# Patient Record
Sex: MALE | Race: WHITE | NOT HISPANIC OR LATINO | Employment: FULL TIME | URBAN - METROPOLITAN AREA
[De-identification: names, ages, dates, MRNs, and addresses within clinical notes are randomized per-mention and may not be internally consistent; named-entity substitution may affect disease eponyms.]

---

## 2017-11-02 ENCOUNTER — ALLSCRIPTS OFFICE VISIT (OUTPATIENT)
Dept: OTHER | Facility: OTHER | Age: 57
End: 2017-11-02

## 2017-11-03 NOTE — PROGRESS NOTES
Assessment  1  Right shoulder pain (328 80) (H80 073)    Plan  Right shoulder pain    · Meloxicam 7 5 MG Oral Tablet; TAKE 1 TABLET TWICE DAILY WITH FOOD    Discussion/Summary    Discussed diagnostic and treatment options with patient  Discussed x-ray although patient prefers to hold off at this time and agree with no trauma involved  Patient will try meloxicam 7 5 mg 1 b i d  with food, caution regarding GI upset  Patient instructed to apply ice for 20 minutes 3-4 times daily and to avoid strenuous activity  Patient to return the office in 2 weeks or sooner anu Smith Possible side effects of new medications were reviewed with the patient/guardian today  The treatment plan was reviewed with the patient/guardian  The patient/guardian understands and agrees with the treatment plan      Chief Complaint  Right shoulder pain      History of Present Illness  Shoulder Pain:   Breezy Medina presents with complaints of intermittent episodes of right and right lateral shoulder pain, described as sharp, non-radiating  Episodes started 8 days ago  Symptoms are made worse by shoulder motion, but not by gripping and lifting  Symptoms are improving  Associated symptoms include numbness in the arm, but-- no swelling,-- no clicking,-- no shoulder bruising,-- no decreased range of motion,-- no instability,-- no redness,-- no warmth,-- no weakness in the arm,-- no paresthesias,-- no pain in the neck,-- no chest pain,-- no fever-- and-- no localized rash  HPI: Patient complains of intermittent right lateral shoulder pain and numbness for the past 8 days  Patient denies any specific injury or fall  Patient denies any neck pain or pain radiating down his arm  Patient thinks he may have slept on it wrong  Patient has continued weight training this week without increased symptoms  No treatment by patient  Patient is primarily left handed  Active Problems  1  Allergic rhinitis (477 9) (J30 9)   2  Chest pain (786 50) (R07 9)   3  Cholelithiasis (574 20) (K80 20)   4  Decreased testosterone level (257 2) (E29 1)   5  Encounter for prostate cancer screening (V76 44) (Z12 5)   6  Encounter for screening colonoscopy (V76 51) (Z12 11)   7  Esophageal reflux (530 81) (K21 9)   8  Fatty liver (571 8) (K76 0)   9  Flu vaccine need (V04 81) (Z23)   10  Gastritis (535 50) (K29 70)   11  Hyperlipidemia (272 4) (E78 5)   12  Hypertension (401 9) (I10)   13  Hypogonadotropic hypogonadism (253 4) (E23 0)   14  Male erectile disorder (607 84) (N52 9)   15  Vitamin D deficiency (268 9) (E55 9)    Family History  Mother    1  Family history of Hypertension (V17 49)   2  Family history of Renal Failure  Brother    3  Family history of Acute Myocardial Infarction (V17 3)   4  Family history of Acute Myocardial Infarction (V17 3)    Social History   · Being A Social Drinker   · Chewing Nicotine-containing Substances Tobacco   · former chewer  pt stopped ? yrs ago   · Never A Smoker   · Never smoker    Surgical History  1  History of Tonsillectomy    Current Meds   1  Fiber TABS; Therapy: (Recorded:81Zsr5004) to Recorded   2  Fish Oil OIL; Therapy: (Recorded:65Csg2203) to Recorded   3  Fluticasone Propionate 50 MCG/ACT Nasal Suspension; USE 2 SPRAYS IN EACH   NOSTRIL ONCE DAILY; Therapy: 30UFP1181 to (Last Rx:22Jan2016)  Requested for: 79MMO3532 Ordered   4  Lisinopril-Hydrochlorothiazide 20-12 5 MG Oral Tablet; Take 1 tablet daily; Therapy: 85IOR0084 to (Last Rx:11Aug2017)  Requested for: 11Aug2017 Ordered   5  Multi-Vitamin TABS; Therapy: (Recorded:69Amx4739) to Recorded   6  Omeprazole 20 MG Oral Capsule Delayed Release; take 1 capsule daily; Therapy: 36ABY8653 to (Last Rx:09Tmg1212)  Requested for: 39UEP2330 Ordered   7  Simvastatin 40 MG Oral Tablet; Take 1 tablet daily; Therapy: 06IAE7939 to (Last Rx:11Aug2017)  Requested for: 11Aug2017 Ordered   8  Viagra 100 MG Oral Tablet; TAKE 1 TABLET DAILY 1 HOUR BEFORE NEEDED;    Therapy: 61UKV8050 to (Last Rx:13Nov2015) Ordered   9  Vitamin B Complex CAPS; Therapy: (Recorded:32Vqa2894) to Recorded   10  Vitamin D 1000 UNIT CAPS; Taking  3 daily for a total of 3000u daily; Therapy: (Recorded:21Jan2016) to Recorded    Allergies  1  Avelox TABS    Vitals   Recorded: [id number] 09:15AM Recorded: 95ETL6768 08:48AM   Temperature  97 8 F   Heart Rate 80    Respiration 16    Systolic  512   Diastolic  88   Height  5 ft 11 75 in   Weight  210 lb    BMI Calculated  28 68   BSA Calculated  2 17     Physical Exam    Constitutional   General appearance: No acute distress, well appearing and well nourished  Pulmonary   Respiratory effort: No increased work of breathing or signs of respiratory distress  Auscultation of lungs: Clear to auscultation, equal breath sounds bilaterally, no wheezes, no rales, no rhonci  Cardiovascular   Auscultation of heart: Normal rate and rhythm, normal S1 and S2, without murmurs  Examination of extremities for edema and/or varicosities: Normal     Abdomen   Abdomen: Non-tender, no masses  Lymphatic   Palpation of lymph nodes in neck: No lymphadenopathy  Musculoskeletal   Gait and station: Normal     Inspection/palpation of joints, bones, and muscles: Normal  -- Right shoulder reveals full range of motion intact and nontender  Strength intact  Mild discomfort over deltoid area but no acute tenderness  Neck reveals full range of motion intact and nontender  Skin   Skin and subcutaneous tissue: Normal without rashes or lesions      Psychiatric   Orientation to person, place and time: Normal     Mood and affect: Normal          Future Appointments    Date/Time Provider Specialty Site   12/15/2017 08:00 AM Nidhi Coleman, [address] [address] [facility]   Electronically signed by : Jorje Ohara DO; Nov 2 2017  9:27AM EST                       (Author)

## 2017-12-15 ENCOUNTER — LAB REQUISITION (OUTPATIENT)
Dept: LAB | Facility: HOSPITAL | Age: 57
End: 2017-12-15
Payer: COMMERCIAL

## 2017-12-15 ENCOUNTER — ALLSCRIPTS OFFICE VISIT (OUTPATIENT)
Dept: OTHER | Facility: OTHER | Age: 57
End: 2017-12-15

## 2017-12-15 DIAGNOSIS — I10 ESSENTIAL (PRIMARY) HYPERTENSION: ICD-10-CM

## 2017-12-15 DIAGNOSIS — Z00.00 ENCOUNTER FOR GENERAL ADULT MEDICAL EXAMINATION WITHOUT ABNORMAL FINDINGS: ICD-10-CM

## 2017-12-15 DIAGNOSIS — Z12.5 ENCOUNTER FOR SCREENING FOR MALIGNANT NEOPLASM OF PROSTATE: ICD-10-CM

## 2017-12-15 DIAGNOSIS — E78.5 HYPERLIPIDEMIA: ICD-10-CM

## 2017-12-15 DIAGNOSIS — K21.9 GASTRO-ESOPHAGEAL REFLUX DISEASE WITHOUT ESOPHAGITIS: ICD-10-CM

## 2017-12-15 DIAGNOSIS — E55.9 VITAMIN D DEFICIENCY: ICD-10-CM

## 2017-12-15 LAB
ALBUMIN SERPL BCP-MCNC: 4.2 G/DL (ref 3.5–5)
ALP SERPL-CCNC: 72 U/L (ref 46–116)
ALT SERPL W P-5'-P-CCNC: 41 U/L (ref 12–78)
ANION GAP SERPL CALCULATED.3IONS-SCNC: 7 MMOL/L (ref 4–13)
AST SERPL W P-5'-P-CCNC: 26 U/L (ref 5–45)
BASOPHILS # BLD AUTO: 0.05 THOUSANDS/ΜL (ref 0–0.1)
BASOPHILS NFR BLD AUTO: 1 % (ref 0–1)
BILIRUB SERPL-MCNC: 0.68 MG/DL (ref 0.2–1)
BILIRUB UR QL STRIP: NEGATIVE
BUN SERPL-MCNC: 19 MG/DL (ref 5–25)
CALCIUM ALBUM COR SERPL-MCNC: 10 MG/DL (ref 8.3–10.1)
CALCIUM SERPL-MCNC: 10.2 MG/DL (ref 8.3–10.1)
CHLORIDE SERPL-SCNC: 103 MMOL/L (ref 100–108)
CHOLEST SERPL-MCNC: 158 MG/DL (ref 50–200)
CLARITY UR: CLEAR
CO2 SERPL-SCNC: 28 MMOL/L (ref 21–32)
COLOR UR: YELLOW
CREAT SERPL-MCNC: 1.1 MG/DL (ref 0.6–1.3)
EOSINOPHIL # BLD AUTO: 0.01 THOUSAND/ΜL (ref 0–0.61)
EOSINOPHIL NFR BLD AUTO: 0 % (ref 0–6)
ERYTHROCYTE [DISTWIDTH] IN BLOOD BY AUTOMATED COUNT: 13.6 % (ref 11.6–15.1)
GFR SERPL CREATININE-BSD FRML MDRD: 74 ML/MIN/1.73SQ M
GLUCOSE SERPL-MCNC: 98 MG/DL (ref 65–140)
GLUCOSE UR STRIP-MCNC: NEGATIVE MG/DL
HCT VFR BLD AUTO: 42.5 % (ref 36.5–49.3)
HDLC SERPL-MCNC: 53 MG/DL (ref 40–60)
HGB BLD-MCNC: 14.3 G/DL (ref 12–17)
HGB UR QL STRIP.AUTO: NEGATIVE
KETONES UR STRIP-MCNC: NEGATIVE MG/DL
LDLC SERPL CALC-MCNC: 87 MG/DL (ref 0–100)
LEUKOCYTE ESTERASE UR QL STRIP: NEGATIVE
LYMPHOCYTES # BLD AUTO: 1.74 THOUSANDS/ΜL (ref 0.6–4.47)
LYMPHOCYTES NFR BLD AUTO: 30 % (ref 14–44)
MCH RBC QN AUTO: 30.2 PG (ref 26.8–34.3)
MCHC RBC AUTO-ENTMCNC: 33.6 G/DL (ref 31.4–37.4)
MCV RBC AUTO: 90 FL (ref 82–98)
MONOCYTES # BLD AUTO: 0.73 THOUSAND/ΜL (ref 0.17–1.22)
MONOCYTES NFR BLD AUTO: 13 % (ref 4–12)
NEUTROPHILS # BLD AUTO: 3.22 THOUSANDS/ΜL (ref 1.85–7.62)
NEUTS SEG NFR BLD AUTO: 56 % (ref 43–75)
NITRITE UR QL STRIP: NEGATIVE
NRBC BLD AUTO-RTO: 0 /100 WBCS
PH UR STRIP.AUTO: 6.5 [PH] (ref 4.5–8)
PLATELET # BLD AUTO: 433 THOUSANDS/UL (ref 149–390)
PMV BLD AUTO: 10.2 FL (ref 8.9–12.7)
POTASSIUM SERPL-SCNC: 5.8 MMOL/L (ref 3.5–5.3)
PROT SERPL-MCNC: 8.3 G/DL (ref 6.4–8.2)
PROT UR STRIP-MCNC: NEGATIVE MG/DL
PSA SERPL-MCNC: 0.9 NG/ML (ref 0–4)
RBC # BLD AUTO: 4.74 MILLION/UL (ref 3.88–5.62)
SODIUM SERPL-SCNC: 138 MMOL/L (ref 136–145)
SP GR UR STRIP.AUTO: 1.02 (ref 1–1.03)
TRIGL SERPL-MCNC: 89 MG/DL
TSH SERPL DL<=0.05 MIU/L-ACNC: 2.93 UIU/ML (ref 0.36–3.74)
UROBILINOGEN UR QL STRIP.AUTO: 0.2 E.U./DL
WBC # BLD AUTO: 5.76 THOUSAND/UL (ref 4.31–10.16)

## 2017-12-15 PROCEDURE — 84443 ASSAY THYROID STIM HORMONE: CPT | Performed by: FAMILY MEDICINE

## 2017-12-15 PROCEDURE — 80053 COMPREHEN METABOLIC PANEL: CPT | Performed by: FAMILY MEDICINE

## 2017-12-15 PROCEDURE — 81003 URINALYSIS AUTO W/O SCOPE: CPT | Performed by: FAMILY MEDICINE

## 2017-12-15 PROCEDURE — G0103 PSA SCREENING: HCPCS | Performed by: FAMILY MEDICINE

## 2017-12-15 PROCEDURE — 85025 COMPLETE CBC W/AUTO DIFF WBC: CPT | Performed by: FAMILY MEDICINE

## 2017-12-15 PROCEDURE — 80061 LIPID PANEL: CPT | Performed by: FAMILY MEDICINE

## 2017-12-16 NOTE — PROGRESS NOTES
Assessment  1  Hypertension (401 9) (I10)   2  Hyperlipidemia (272 4) (E78 5)   3  Encounter for preventive health examination (V70 0) (Z00 00)    Plan  Health Maintenance    · Fluzone Quadrivalent Intramuscular Suspension; INJECT 0 5  MLIntramuscular; To Be Done: 55FUA7531  Health Maintenance, Encounter for prostate cancer screening, Esophageal reflux,Hyperlipidemia, Hypertension, Vitamin D deficiency    · (1) CBC/PLT/DIFF; Status:Hold For - Exact Date; Requested for: In Office Collection;    · (1) COMPREHENSIVE METABOLIC PANEL; Status:Hold For - Exact Date; Requestedfor: In Office Collection;    · (1) LIPID PANEL, FASTING; Status:Hold For - Exact Date; Requested for: In OfficeCollection;    · (1) PSA (SCREEN) (Dx V76 44 Screen for Prostate Cancer); Status:Hold For - Exact Date; Requested for: In Office Collection;    · (1) TSH WITH FT4 REFLEX; Status:Hold For - Exact Date; Requested for: In OfficeCollection;    · (1) URINALYSIS (will reflex a microscopy if leukocytes, occult blood, protein or nitrites arenot within normal limits); Status:Hold For - Exact Date; Requested for: In [employer]; Discussion/Summary    Patient received flu vaccine today  Fasting labs drawn as above  Patient to continue present treatment  Patient instructed to follow a low-fat and a low-salt diet and continue regular exercise, 150 minutes of aerobic exercise weekly  Patient to return to the office in 6 months  Possible side effects of new medications were reviewed with the patient/guardian today  The treatment plan was reviewed with the patient/guardian  The patient/guardian understands and agrees with the treatment plan      Chief Complaint  [facility]   Patient is here today for follow up of chronic conditions described in HPI  History of Present Illness  Patient is a 65-year-old male who is here for an annual wellness preventative physical exam for his health insurance plan and request fasting labs   Patient has been feeling well overall and continues to exercise regularly weight lifting 3 times a week and for the past several months has avoided red meat in his diet and his weight is down approximately 20 lb  Patient requests flu vaccine  Patient denies any vision problems and sees his dentist regularly  Patient is up-to-date on colonoscopy  Patient is a nonsmoker  The patient states his hyperlipidemia has been under good control since the last visit  Comorbid Illnesses: hypertension  He has no significant interval events  Symptoms: denies muscle pain-- and-- denies muscle weakness  Associated symptoms include no memory loss  Medications: the patient is adherent with his medication regimen  -- He denies medication side effects  The patient is doing well with his hyperlipidemia goals  the patient's last LDL was 80 mg/dL  The patient is due for a lipid panel-- and-- liver function tests  The patient presents for follow-up of essential hypertension  The patient states he has been doing well with his blood pressure control since the last visit  He has no significant interval events  Symptoms: denies impaired vision,-- denies dyspnea,-- denies chest pain,-- denies intermittent leg claudication-- and-- denies lower extremity edema  Associated symptoms include no headache  Home monitoring: The patient is not checking blood pressure at home  Medications: the patient is adherent with his medication regimen  -- He denies medication side effects  Disease Management: the patient is doing well with his blood pressure goals  Review of Systems   Constitutional: recent 20 lb weight loss, but-- no fever,-- not feeling poorly,-- no chills-- and-- not feeling tired  Eyes: No complaints of eye pain, no red eyes, no discharge from eyes, no itchy eyes  ENT: nasal discharge, but-- no earache,-- no nosebleeds,-- no sore throat-- and-- no hearing loss    Gastrointestinal: No complaints of abdominal pain, no constipation, no nausea or vomiting, no diarrhea or bloody stools  Genitourinary: no dysuria,-- no urinary hesitancy-- and-- no nocturia  Hematologic/Lymphatic: No complaints of swollen glands, no swollen glands in the neck, does not bleed easily, no easy bruising  Active Problems  1  Allergic rhinitis (477 9) (J30 9)   2  Chest pain (786 50) (R07 9)   3  Cholelithiasis (574 20) (K80 20)   4  Decreased testosterone level (257 2) (E29 1)   5  Encounter for prostate cancer screening (V76 44) (Z12 5)   6  Encounter for screening colonoscopy (V76 51) (Z12 11)   7  Esophageal reflux (530 81) (K21 9)   8  Fatty liver (571 8) (K76 0)   9  Flu vaccine need (V04 81) (Z23)   10  Gastritis (535 50) (K29 70)   11  Hyperlipidemia (272 4) (E78 5)   12  Hypertension (401 9) (I10)   13  Hypogonadotropic hypogonadism (253 4) (E23 0)   14  Male erectile disorder (607 84) (N52 9)   15  Right shoulder pain (719 41) (M25 511)   16  Vitamin D deficiency (268 9) (E55 9)    Surgical History  1  History of Tonsillectomy    Family History  Mother    1  Family history of Hypertension (V17 49)   2  Family history of Renal Failure  Brother    3  Family history of Acute Myocardial Infarction (V17 3)   4  Family history of Acute Myocardial Infarction (V17 3)    Social History   · Being A Social Drinker   · Chewing Nicotine-containing Substances Tobacco   · Never A Smoker   · Never smoker    Current Meds   1  Fiber TABS; Therapy: (Recorded:75Hzk9980) to Recorded   2  Fish Oil OIL; Therapy: (Recorded:77Lat9416) to Recorded   3  Fluticasone Propionate 50 MCG/ACT Nasal Suspension; USE 2 SPRAYS IN EACH NOSTRIL ONCE DAILY; Therapy: 79TGX2774 to (Last Rx:22Jan2016)  Requested for: 63JQN4340 Ordered   4  Lisinopril-Hydrochlorothiazide 20-12 5 MG Oral Tablet; Take 1 tablet daily; Therapy: 44DKD0600 to (Last Rx:11Aug2017)  Requested for: 87VGS1539; Status: ACTIVE - Renewal Denied Ordered   5  Meloxicam 7 5 MG Oral Tablet; TAKE 1 TABLET TWICE DAILY WITH FOOD;  Therapy: 17PTP7736 to (Evaluate:04Xlk6240)  Requested for: 32DOM1801; Last Rx:02Nov2017 Ordered   6  Multi-Vitamin TABS; Therapy: (Recorded:31May2013) to Recorded   7  Omeprazole 20 MG Oral Capsule Delayed Release; take 1 capsule daily; Therapy: 81EJZ7217 to (Last Rx:43Pai1573)  Requested for: 99WBD3899 Ordered   8  Simvastatin 40 MG Oral Tablet; Take 1 tablet daily; Therapy: 90DJG4919 to (Last Rx:11Aug2017)  Requested for: 38PBT3372; Status: ACTIVE - Renewal Denied Ordered   9  Viagra 100 MG Oral Tablet; TAKE 1 TABLET DAILY 1 HOUR BEFORE NEEDED; Therapy: 87EOZ0098 to (Last Rx:13Nov2015) Ordered   10  Vitamin B Complex CAPS; Therapy: (Recorded:81Bug3509) to Recorded   11  Vitamin D 1000 UNIT CAPS; Taking  3 daily for a total of 3000u daily; Therapy: (Recorded:21Jan2016) to Recorded    Allergies  1  Avelox TABS    Vitals  Vital Signs    Recorded: 25QSE6905 08:23AM Recorded: 25Ouj5350 08:02AM   Temperature  97 6 F   Heart Rate 84    Respiration 16    Systolic 786    Diastolic 82    Height  5 ft 11 75 in   Weight  209 lb    BMI Calculated  28 55   BSA Calculated  2 17     Physical Exam   Constitutional  General appearance: No acute distress, well appearing and well nourished  Eyes  Conjunctiva and lids: No swelling, erythema, or discharge  Ears, Nose, Mouth, and Throat  External inspection of ears and nose: Normal    Otoscopic examination: Tympanic membrance translucent with normal light reflex  Canals patent without erythema  Nasal mucosa, septum, and turbinates: Normal without edema or erythema  Oropharynx: Normal with no erythema, edema, exudate or lesions  Pulmonary  Respiratory effort: No increased work of breathing or signs of respiratory distress  Auscultation of lungs: Clear to auscultation, equal breath sounds bilaterally, no wheezes, no rales, no rhonci  Cardiovascular  Auscultation of heart: Normal rate and rhythm, normal S1 and S2, without murmurs     Examination of extremities for edema and/or varicosities: Normal    Carotid pulses: Normal    Abdomen  Abdomen: Non-tender, no masses  Lymphatic  Palpation of lymph nodes in neck: No lymphadenopathy  Musculoskeletal  Gait and station: Normal    Inspection/palpation of joints, bones, and muscles: Normal    Skin  Skin and subcutaneous tissue: Normal without rashes or lesions  Psychiatric  Orientation to person, place and time: Normal    Mood and affect: Normal          Health Management  Hyperlipidemia   (1) HEPATIC FUNCTION PANEL; every 6 months; Last 30POJ3464; Next Due: 63NMT2549; Overdue  (1) LIPID PANEL, FASTING; every 1 year; Last 42ZUE1938; Next Due: 53GBN0005;  Overdue    Signatures   Electronically signed by : Margaret Stark DO; Dec 15 2017  8:33AM EST                       (Author)

## 2017-12-18 ENCOUNTER — GENERIC CONVERSION - ENCOUNTER (OUTPATIENT)
Dept: OTHER | Facility: OTHER | Age: 57
End: 2017-12-18

## 2017-12-18 DIAGNOSIS — E87.5 HYPERKALEMIA: ICD-10-CM

## 2017-12-19 ENCOUNTER — APPOINTMENT (OUTPATIENT)
Dept: LAB | Facility: HOSPITAL | Age: 57
End: 2017-12-19
Payer: COMMERCIAL

## 2017-12-19 ENCOUNTER — GENERIC CONVERSION - ENCOUNTER (OUTPATIENT)
Dept: OTHER | Facility: OTHER | Age: 57
End: 2017-12-19

## 2017-12-19 DIAGNOSIS — E87.5 HYPERKALEMIA: ICD-10-CM

## 2017-12-19 LAB — POTASSIUM SERPL-SCNC: 5.4 MMOL/L (ref 3.5–5.3)

## 2017-12-19 PROCEDURE — 36415 COLL VENOUS BLD VENIPUNCTURE: CPT

## 2017-12-19 PROCEDURE — 84132 ASSAY OF SERUM POTASSIUM: CPT

## 2018-01-12 VITALS
BODY MASS INDEX: 28.44 KG/M2 | HEART RATE: 80 BPM | DIASTOLIC BLOOD PRESSURE: 88 MMHG | SYSTOLIC BLOOD PRESSURE: 138 MMHG | HEIGHT: 72 IN | TEMPERATURE: 97.8 F | RESPIRATION RATE: 16 BRPM | WEIGHT: 210 LBS

## 2018-01-12 NOTE — RESULT NOTES
Verified Results  (1) CBC/PLT/DIFF 26FNO8463 10:17AM [facility] Order Number: [id number]     Test Name Result Flag Reference   WBC COUNT 6 87 Thousand/uL  4 31-10 16   RBC COUNT 4 68 Million/uL  3 88-5 62   HEMOGLOBIN 14 0 g/dL  12 0-17 0   HEMATOCRIT 41 1 %  36 5-49 3   MCV 88 fL  82-98   MCH 29 9 pg  26 8-34 3   MCHC 34 1 g/dL  31 4-37 4   RDW 13 0 %  11 6-15 1   MPV 10 2 fL  8 9-12 7   PLATELET COUNT 019 Thousands/uL  149-390   nRBC AUTOMATED 0 /100 WBCs     NEUTROPHILS RELATIVE PERCENT 59 %  43-75   LYMPHOCYTES RELATIVE PERCENT 29 %  14-44   MONOCYTES RELATIVE PERCENT 11 %  4-12   EOSINOPHILS RELATIVE PERCENT 0 %  0-6   BASOPHILS RELATIVE PERCENT 1 %  0-1   NEUTROPHILS ABSOLUTE COUNT 4 05 Thousands/?L  1 85-7 62   LYMPHOCYTES ABSOLUTE COUNT 1 99 Thousands/?L  0 60-4 47   MONOCYTES ABSOLUTE COUNT 0 73 Thousand/?L  0 17-1 22   EOSINOPHILS ABSOLUTE COUNT 0 03 Thousand/?L  0 00-0 61   BASOPHILS ABSOLUTE COUNT 0 04 Thousands/?L  0 00-0 10   - Patient Instructions: This bloodwork is non-fasting  Please drink two glasses of water morning of bloodwork  (1) COMPREHENSIVE METABOLIC PANEL 07LJN2563 31:84CR [facility] Order Number: [id number]     Test Name Result Flag Reference   GLUCOSE,RANDM 100 mg/dL     If the patient is fasting, the ADA then defines impaired fasting glucose as > 100 mg/dL and diabetes as > or equal to 123 mg/dL     SODIUM 136 mmol/L  136-145   POTASSIUM 5 0 mmol/L  3 5-5 3   CHLORIDE 100 mmol/L  100-108   CARBON DIOXIDE 30 mmol/L  21-32   ANION GAP (CALC) 6 mmol/L  4-13   BLOOD UREA NITROGEN 25 mg/dL  5-25   CREATININE 1 10 mg/dL  0 60-1 30   Standardized to IDMS reference method   CALCIUM 9 7 mg/dL  8 3-10 1   BILI, TOTAL 0 65 mg/dL  0 20-1 00   ALK PHOSPHATAS 77 U/L     ALT (SGPT) 68 U/L  12-78   AST(SGOT) 27 U/L  5-45   ALBUMIN 4 4 g/dL  3 5-5 0   TOTAL PROTEIN 8 3 g/dL H 6 4-8 2   eGFR Non-African American      >60 0 ml/min/1 73sq m National Kidney Disease Education Program recommendations are as follows:  GFR calculation is accurate only with a steady state creatinine  Chronic Kidney disease less than 60 ml/min/1 73 sq  meters  Kidney failure less than 15 ml/min/1 73 sq  meters  (1) LIPID PANEL, FASTING 44YZO3511 10:17AM [facility] Order Number: [id number]     Test Name Result Flag Reference   CHOLESTEROL 157 mg/dL     HDL,DIRECT 49 mg/dL  40-60   Specimen collection should occur prior to Metamizole administration due to the potential for falsely depressed results  LDL CHOLESTEROL CALCULATED 80 mg/dL  0-100   - Patient Instructions: This is a fasting blood test  Water,black tea or black  coffee only after 9:00pm the night before test   Drink 2 glasses of water the morning of test       Triglyceride:         Normal              <150 mg/dl       Borderline High    150-199 mg/dl       High               200-499 mg/dl       Very High          >499 mg/dl  Cholesterol:         Desirable        <200 mg/dl      Borderline High  200-239 mg/dl      High             >239 mg/dl  HDL Cholesterol:        High    >59 mg/dL      Low     <41 mg/dL  LDL CALCULATED:    This screening LDL is a calculated result  It does not have the accuracy of the Direct Measured LDL in the monitoring of patients with hyperlipidemia and/or statin therapy  Direct Measure LDL (NFB532) must be ordered separately in these patients  TRIGLYCERIDES 139 mg/dL  <=150   Specimen collection should occur prior to N-Acetylcysteine or Metamizole administration due to the potential for falsely depressed results  (1) TSH WITH FT4 REFLEX 38HRQ0898 10:17AM [facility] Order Number: [id number]     Test Name Result Flag Reference   TSH 2 630 uIU/mL  0 358-3 740   Patients undergoing fluorescein dye angiography may retain small amounts of fluorescein in the body for 48-72 hours post procedure   Samples containing fluorescein can produce falsely depressed TSH values  If the patient had this procedure,a specimen should be resubmitted post fluorescein clearance  (1) VITAMIN D 25-HYDROXY 22LNZ3406 10:17AM [facility] Order Number: [id number]     Test Name Result Flag Reference   VIT D 25-HYDROX 44 3 ng/mL  30 0-100 0   This assay is a certified procedure of the CDC Vitamin D Standardization Certification Program (VDSCP)     Deficiency <20ng/ml   Insufficiency 20-30ng/ml   Sufficient  ng/ml     *Patients undergoing fluorescein dye angiography may retain small amounts of fluorescein in the body for 48-72 hours post procedure  Samples containing fluorescein can produce falsely elevated Vitamin D values  If the patient had this procedure, a specimen should be resubmitted post fluorescein clearance  (1) URINALYSIS (will reflex a microscopy if leukocytes, occult blood, protein or nitrites are not within normal limits) 60WDS3253 10:17AM [facility] Order Number: [id number]     Test Name Result Flag Reference   COLOR Yellow     CLARITY Clear     SPECIFIC GRAVITY UA 1 015  1 003-1 030   PH UA 5 5  4 5-8 0   LEUKOCYTE ESTERASE UA Negative  Negative   NITRITE UA Negative  Negative   PROTEIN UA Negative mg/dl  Negative   GLUCOSE UA Negative mg/dl  Negative   KETONES UA Negative mg/dl  Negative   UROBILINOGEN UA 0 2 E U /dl  0 2, 1 0 E U /dl   BILIRUBIN UA Negative  Negative   BLOOD UA Negative  Negative     (1) PSA (SCREEN) (Dx V76 44 Screen for Prostate Cancer) 61TNH9617 10:17AM [facility] Order Number: [id number]     Test Name Result Flag Reference   PROSTATE SPECIFIC ANTIGEN 0 8 ng/mL  0 0-4 0   American Urological Association Guidelines define biochemical recurrence of prostate cancer as a detectable or rising PSA value post-radical prostatectomy that is greater than or equal to 0 2 ng/mL with a second confirmatory level of greater than or equal to 0 2 ng/mL     - Patient Instructions: This test is non-fasting  Please drink two glasses of water morning of bloodwork  Plan  Hyperlipidemia    · (1) LIPID PANEL, FASTING ; every 1 year; Last 38PKI8867; Next 64SRB1907;  Status:Active    Discussion/Summary   Labs ok, cont present Tx

## 2018-01-13 NOTE — PROGRESS NOTES
Assessment    1  Acute sinusitis (461 9) (J01 90)   2  Allergic rhinitis (477 9) (J30 9)    Plan  Acute sinusitis, Allergic rhinitis    · Cefuroxime Axetil 250 MG Oral Tablet; TAKE 1 TABLET EVERY 12 HOURS DAILY   · Fluticasone Propionate 50 MCG/ACT Nasal Suspension; USE 2 SPRAYS IN EACH  NOSTRIL ONCE DAILY  Allergic rhinitis    · Fluticasone Propionate 50 MCG/ACT Nasal Suspension; USE 2 SPRAYS IN EACH  NOSTRIL ONCE DAILY    Discussion/Summary    Patient will be started on Ceftin 250 mg one twice a day x10 days and fluticasone nasal spray 2 sprays per nostril daily  Patient may continue Mucinex when necessary  He is encouraged drink plenty of fluids and rest area patient to return the office in one week or sooner when necessary  Chief Complaint  sinus pressure/drainage, cough, sneezing, f/u MRI      History of Present Illness  HPI: Past few days patient complains of nasal congestion, postnasal drainage and cough slightly productive of mucus  He admits to sinus pressure headache but denies any fever  Patient is treated this with Mucinex  We reviewed the patient's MRI revealed mucosal thickening of the sinuses but no air-fluid levels  Patient and wife admits to chronic snoring which is worse with cold symptoms  Patient denies other symptoms of sleep apnea  Cold Symptoms: Shila Diallo presents with complaints of cold symptoms  Associated symptoms include sneezing, nasal congestion, post nasal drainage, dry cough, productive cough and facial pressure, but no sore throat, no headache, no plugged ear(s), no ear pain, no wheezing, no shortness of breath, no fever and no chills  Active Problems    1  Abdominal pain (789 00) (R10 9)   2  Acute conjunctivitis (372 00) (H10 30)   3  Acute sinusitis (461 9) (J01 90)   4  Acute upper respiratory infection (465 9) (J06 9)   5  Allergic rhinitis (477 9) (J30 9)   6  Chest pain (786 50) (R07 9)   7  Cholelithiasis (574 20) (K80 20)   8   Decreased testosterone level (257 2) (E29 1)   9  Esophageal reflux (530 81) (K21 9)   10  Fatty liver (571 8) (K76 0)   11  Gastritis (535 50) (K29 70)   12  Hyperlipidemia (272 4) (E78 5)   13  Hypertension (401 9) (I10)   14  Hypogonadotropic hypogonadism (253 4) (E23 0)   15  Male erectile disorder (607 84) (N52 9)   16  Vitamin D deficiency (268 9) (E55 9)    Family History    1  Family history of Hypertension (V17 49)   2  Family history of Renal Failure    3  Family history of Acute Myocardial Infarction (V17 3)   4  Family history of Acute Myocardial Infarction (V17 3)    Social History    · Being A Social Drinker   · Chewing Nicotine-containing Substances Tobacco   · former chewer  pt stopped ? yrs ago   · Never A Smoker    Surgical History    1  History of Tonsillectomy    Current Meds   1  Aspirin EC 81 MG Oral Tablet Delayed Release; Therapy: (Recorded:31May2013) to Recorded   2  Fish Oil OIL; Therapy: (Recorded:31May2013) to Recorded   3  Lisinopril-Hydrochlorothiazide 20-12 5 MG Oral Tablet; Take 1 tablet daily; Therapy: 92QRW0472 to (Last Rx:61Kxm5629)  Requested for: 05Rzi8686 Ordered   4  Metamucil CAPS; Therapy: (Consuello Flax) to Recorded   5  Multi-Vitamin TABS; Therapy: (Recorded:31May2013) to Recorded   6  Omeprazole 20 MG Oral Capsule Delayed Release; take 1 capsule daily; Therapy: 57JKU1909 to (Last Rx:17Nov2015)  Requested for: 12EEZ3541 Ordered   7  Simvastatin 40 MG Oral Tablet; Take 1 tablet daily; Therapy: 86JPX2128 to (Last Rx:57Rhq9097)  Requested for: 77Zhf8272 Ordered   8  Viagra 100 MG Oral Tablet; TAKE 1 TABLET DAILY 1 HOUR BEFORE NEEDED; Therapy: 03GHO6265 to (Last Rx:13Nov2015) Ordered   9  Vitamin B Complex CAPS; Therapy: (Recorded:31May2013) to Recorded   10  Vitamin D 1000 UNIT CAPS; Taking  3 daily for a total of 3000u daily; Therapy: (Recorded:21Jan2016) to Recorded    Allergies    1   Avelox TABS    Vitals   Recorded: 34XCH5470 02:53PM Recorded: 31OOR9694 02:33PM   Temperature 97 6 F   Heart Rate 72    Respiration 16    Systolic 884 847   Diastolic 80 78   Height  6 ft    Weight  231 lb    BMI Calculated  31 33   BSA Calculated  2 26     Physical Exam    Constitutional   General appearance: No acute distress, well appearing and well nourished  Eyes   Conjunctiva and lids: No swelling, erythema, or discharge  Ears, Nose, Mouth, and Throat   External inspection of ears and nose: Normal     Otoscopic examination: Tympanic membrance translucent with normal light reflex  Canals patent without erythema  Nasal mucosa, septum, and turbinates: Abnormal   There was a mucoid discharge from both nares  The bilateral nasal mucosa was edematous  Oropharynx: Abnormal   pnd    Pulmonary   Respiratory effort: No increased work of breathing or signs of respiratory distress  Auscultation of lungs: Clear to auscultation, equal breath sounds bilaterally, no wheezes, no rales, no rhonci  Cardiovascular   Auscultation of heart: Normal rate and rhythm, normal S1 and S2, without murmurs  Examination of extremities for edema and/or varicosities: Normal     Abdomen   Abdomen: Non-tender, no masses  Lymphatic   Palpation of lymph nodes in neck: No lymphadenopathy  Musculoskeletal   Gait and station: Normal     Skin   Skin and subcutaneous tissue: Normal without rashes or lesions      Psychiatric   Orientation to person, place and time: Normal     Mood and affect: Normal          Future Appointments    Date/Time Provider Specialty Site   07/05/2016 08:30 AM Rika Barnett [facility] Endocrinology [facility] ENDOCRINOLOGY     Signatures   Electronically signed by : ALEC Stallworth DO; Jan 22 2016  3:04PM EST                       (Author)

## 2018-01-23 VITALS
RESPIRATION RATE: 16 BRPM | SYSTOLIC BLOOD PRESSURE: 130 MMHG | HEIGHT: 72 IN | TEMPERATURE: 97.6 F | DIASTOLIC BLOOD PRESSURE: 82 MMHG | HEART RATE: 84 BPM | BODY MASS INDEX: 28.31 KG/M2 | WEIGHT: 209 LBS

## 2018-01-23 NOTE — RESULT NOTES
Discussion/Summary   Potassium improved although remains slightly elevated  Cont present Tx and D/C any potassiuim supplements  Decrease potassium in diet ie- bananas, orange juice, potatoes and increase water intake       Verified Results  (1) POTASSIUM [id number] 07:39PM Andree Broad Order Number: [id number]     Test Name Result Flag Reference   POTASSIUM 5 4 mmol/L H 3 5-5 3

## 2018-01-23 NOTE — RESULT NOTES
Discussion/Summary   Labs okay except potassium level is elevated  Recommend patient repeat potassium today, order placed in [employer]  Continue present treatment  Verified Results  (1) CBC/PLT/DIFF [id number] 08:36AM Rosaura Shields Order Number: [id number]     Test Name Result Flag Reference   WBC COUNT 5 76 Thousand/uL  4 31-10 16   RBC COUNT 4 74 Million/uL  3 88-5 62   HEMOGLOBIN 14 3 g/dL  12 0-17 0   HEMATOCRIT 42 5 %  36 5-49 3   MCV 90 fL  82-98   MCH 30 2 pg  26 8-34 3   MCHC 33 6 g/dL  31 4-37 4   RDW 13 6 %  11 6-15 1   MPV 10 2 fL  8 9-12 7   PLATELET COUNT 770 Thousands/uL H 149-390   nRBC AUTOMATED 0 /100 WBCs     NEUTROPHILS RELATIVE PERCENT 56 %  43-75   LYMPHOCYTES RELATIVE PERCENT 30 %  14-44   MONOCYTES RELATIVE PERCENT 13 % H 4-12   EOSINOPHILS RELATIVE PERCENT 0 %  0-6   BASOPHILS RELATIVE PERCENT 1 %  0-1   NEUTROPHILS ABSOLUTE COUNT 3 22 Thousands/? ??L  1 85-7 62   LYMPHOCYTES ABSOLUTE COUNT 1 74 Thousands/? ??L  0 60-4 47   MONOCYTES ABSOLUTE COUNT 0 73 Thousand/? ??L  0 17-1 22   EOSINOPHILS ABSOLUTE COUNT 0 01 Thousand/? ??L  0 00-0 61   BASOPHILS ABSOLUTE COUNT 0 05 Thousands/? ??L  0 00-0 10     (1) COMPREHENSIVE METABOLIC PANEL 62TZT1135 86:26MK Rosaura Shields Order Number: [id number]     Test Name Result Flag Reference   GLUCOSE,RANDM 98 mg/dL     If the patient is fasting, the ADA then defines impaired fasting glucose as > 100 mg/dL and diabetes as > or equal to 123 mg/dL  Specimen collection should occur prior to Sulfasalazine administration due to the potential for falsely depressed results  Specimen collection should occur prior to Sulfapyridine administration due to the potential for falsely elevated results     SODIUM 138 mmol/L  136-145   POTASSIUM 5 8 mmol/L H 3 5-5 3   CHLORIDE 103 mmol/L  100-108   CARBON DIOXIDE 28 mmol/L  21-32   ANION GAP (CALC) 7 mmol/L  4-13   BLOOD UREA NITROGEN 19 mg/dL  5-25   CREATININE 1 10 mg/dL  0 60-1 30 Standardized to IDMS reference method   CALCIUM 10 2 mg/dL H 8 3-10 1   BILI, TOTAL 0 68 mg/dL  0 20-1 00   ALK PHOSPHATAS 72 U/L     ALT (SGPT) 41 U/L  12-78   Specimen collection should occur prior to Sulfasalazine and/or Sulfapyridine administration due to the potential for falsely depressed results  AST(SGOT) 26 U/L  5-45   Specimen collection should occur prior to Sulfasalazine administration due to the potential for falsely depressed results  ALBUMIN 4 2 g/dL  3 5-5 0   TOTAL PROTEIN 8 3 g/dL H 6 4-8 2   CORRECTED CALCIUM 10 0 mg/dL  8 3-10 1   eGFR 74 ml/min/1 73sq m     National Kidney Disease Education Program recommendations are as follows:  GFR calculation is accurate only with a steady state creatinine  Chronic Kidney disease less than 60 ml/min/1 73 sq  meters  Kidney failure less than 15 ml/min/1 73 sq  meters  (1) LIPID PANEL, FASTING 44Miy3158 08:36AM Dorian Barr Order Number: [id number]     Test Name Result Flag Reference   CHOLESTEROL 158 mg/dL     HDL,DIRECT 53 mg/dL  40-60   Specimen collection should occur prior to Metamizole administration due to the potential for falsley depressed results  LDL CHOLESTEROL CALCULATED 87 mg/dL  0-100   Triglyceride:        Normal <150 mg/dl   Borderline High 150-199 mg/dl   High 200-499 mg/dl   Very High >499 mg/dl      Cholesterol:       Desirable <200 mg/dl    Borderline High 200-239 mg/dl    High >239 mg/dl      HDL Cholesterol:       High>59 mg/dL    Low <41 mg/dL      This screening LDL is a calculated result  It does not have the accuracy of the Direct Measured LDL in the monitoring of patients with hyperlipidemia and/or statin therapy  Direct Measure LDL (KYE236) must be ordered separately in these patients  TRIGLYCERIDES 89 mg/dL  <=150   Specimen collection should occur prior to N-Acetylcysteine or Metamizole administration due to the potential for falsely depressed results       (1) TSH WITH FT4 REFLEX [id number] 08:36AM [facility] Order Number: [id number]     Test Name Result Flag Reference   TSH 2 930 uIU/mL  0 358-3 740   Patients undergoing fluorescein dye angiography may retain small amounts of fluorescein in the body for 48-72 hours post procedure  Samples containing fluorescein can produce falsely depressed TSH values  If the patient had this procedure,a specimen should be resubmitted post fluorescein clearance  (1) URINALYSIS (will reflex a microscopy if leukocytes, occult blood, protein or nitrites are not within normal limits) [id number] 08:36AM [facility] Order Number: [id number]     Test Name Result Flag Reference   COLOR Yellow     CLARITY Clear     SPECIFIC GRAVITY UA 1 024  1 003-1 030   PH UA 6 5  4 5-8 0   LEUKOCYTE ESTERASE UA Negative  Negative   NITRITE UA Negative  Negative   PROTEIN UA Negative mg/dl  Negative   GLUCOSE UA Negative mg/dl  Negative   KETONES UA Negative mg/dl  Negative   UROBILINOGEN UA 0 2 E U /dl  0 2, 1 0 E U /dl   BILIRUBIN UA Negative  Negative   BLOOD UA Negative  Negative     (1) PSA (SCREEN) (Dx V76 44 Screen for Prostate Cancer) 93UVD7545 08:36AM [facility] Order Number: [id number]     Test Name Result Flag Reference   PROSTATE SPECIFIC ANTIGEN 0 9 ng/mL  0 0-4 0   American Urological Association Guidelines define biochemical recurrence of prostate cancer as a detectable or rising PSA value post-radical prostatectomy that is greater than or equal to 0 2 ng/mL with a second confirmatory level of greater than or equal to 0 2 ng/mL  Plan  Hyperlipidemia    · (1) LIPID PANEL, FASTING ; every 1 year; Last 02KWQ0434; Next 51YPS6349;  Status:Active  Serum potassium elevated    · (1) POTASSIUM; Status:Active;  Requested for:84Znk2264;

## 2018-02-26 DIAGNOSIS — N52.9 ERECTILE DYSFUNCTION, UNSPECIFIED ERECTILE DYSFUNCTION TYPE: Primary | ICD-10-CM

## 2018-02-26 RX ORDER — SILDENAFIL 100 MG/1
1 TABLET, FILM COATED ORAL
COMMUNITY
Start: 2015-11-13 | End: 2018-02-26 | Stop reason: SDUPTHER

## 2018-02-27 RX ORDER — SILDENAFIL 100 MG/1
100 TABLET, FILM COATED ORAL AS NEEDED
Qty: 10 TABLET | Refills: 0 | Status: SHIPPED | OUTPATIENT
Start: 2018-02-27 | End: 2018-03-06 | Stop reason: SDUPTHER

## 2018-03-06 DIAGNOSIS — N52.9 ERECTILE DYSFUNCTION, UNSPECIFIED ERECTILE DYSFUNCTION TYPE: ICD-10-CM

## 2018-03-06 RX ORDER — SILDENAFIL 100 MG/1
100 TABLET, FILM COATED ORAL AS NEEDED
Qty: 10 TABLET | Refills: 0 | Status: SHIPPED | OUTPATIENT
Start: 2018-03-06 | End: 2018-06-27 | Stop reason: SDUPTHER

## 2018-03-23 DIAGNOSIS — E78.5 HYPERLIPIDEMIA, UNSPECIFIED HYPERLIPIDEMIA TYPE: ICD-10-CM

## 2018-03-23 DIAGNOSIS — K21.9 GASTROESOPHAGEAL REFLUX DISEASE WITHOUT ESOPHAGITIS: Primary | ICD-10-CM

## 2018-03-23 DIAGNOSIS — I10 ESSENTIAL HYPERTENSION: ICD-10-CM

## 2018-03-23 RX ORDER — OMEPRAZOLE 20 MG/1
20 CAPSULE, DELAYED RELEASE ORAL DAILY
Qty: 90 CAPSULE | Refills: 1 | Status: SHIPPED | OUTPATIENT
Start: 2018-03-23 | End: 2018-09-19 | Stop reason: SDUPTHER

## 2018-03-23 RX ORDER — OMEPRAZOLE 20 MG/1
1 CAPSULE, DELAYED RELEASE ORAL DAILY
COMMUNITY
Start: 2013-12-06 | End: 2018-03-23 | Stop reason: SDUPTHER

## 2018-03-23 RX ORDER — LISINOPRIL AND HYDROCHLOROTHIAZIDE 20; 12.5 MG/1; MG/1
1 TABLET ORAL DAILY
COMMUNITY
Start: 2013-02-05 | End: 2018-03-23 | Stop reason: SDUPTHER

## 2018-03-23 RX ORDER — SIMVASTATIN 40 MG
40 TABLET ORAL DAILY
Qty: 90 TABLET | Refills: 1 | Status: SHIPPED | OUTPATIENT
Start: 2018-03-23 | End: 2018-09-19 | Stop reason: SDUPTHER

## 2018-03-23 RX ORDER — SIMVASTATIN 40 MG
1 TABLET ORAL DAILY
COMMUNITY
Start: 2012-01-20 | End: 2018-03-23 | Stop reason: SDUPTHER

## 2018-03-23 RX ORDER — LISINOPRIL AND HYDROCHLOROTHIAZIDE 20; 12.5 MG/1; MG/1
1 TABLET ORAL DAILY
Qty: 90 TABLET | Refills: 1 | Status: SHIPPED | OUTPATIENT
Start: 2018-03-23 | End: 2018-09-19 | Stop reason: SDUPTHER

## 2018-06-27 DIAGNOSIS — N52.9 ERECTILE DYSFUNCTION, UNSPECIFIED ERECTILE DYSFUNCTION TYPE: ICD-10-CM

## 2018-06-27 RX ORDER — SILDENAFIL 100 MG/1
100 TABLET, FILM COATED ORAL AS NEEDED
Qty: 10 TABLET | Refills: 0 | Status: SHIPPED | OUTPATIENT
Start: 2018-06-27 | End: 2018-07-23 | Stop reason: SDUPTHER

## 2018-07-11 ENCOUNTER — TELEPHONE (OUTPATIENT)
Dept: FAMILY MEDICINE CLINIC | Facility: CLINIC | Age: 58
End: 2018-07-11

## 2018-07-11 NOTE — TELEPHONE ENCOUNTER
Pt is requesting prior auth done on Sildenafil, fax was sent on 07/03/2018 (scanned into media)   Tried faxing it again to [employer]

## 2018-07-16 ENCOUNTER — HOSPITAL ENCOUNTER (INPATIENT)
Facility: HOSPITAL | Age: 58
LOS: 1 days | Discharge: HOME | DRG: 149 | End: 2018-07-17
Attending: EMERGENCY MEDICINE | Admitting: INTERNAL MEDICINE
Payer: COMMERCIAL

## 2018-07-16 ENCOUNTER — APPOINTMENT (INPATIENT)
Dept: MRI IMAGING | Facility: HOSPITAL | Age: 58
DRG: 149 | End: 2018-07-16
Payer: COMMERCIAL

## 2018-07-16 ENCOUNTER — APPOINTMENT (EMERGENCY)
Dept: CT IMAGING | Facility: HOSPITAL | Age: 58
DRG: 149 | End: 2018-07-16
Payer: COMMERCIAL

## 2018-07-16 ENCOUNTER — APPOINTMENT (EMERGENCY)
Dept: RADIOLOGY | Facility: HOSPITAL | Age: 58
DRG: 149 | End: 2018-07-16
Payer: COMMERCIAL

## 2018-07-16 DIAGNOSIS — R42 DIZZINESS: Primary | ICD-10-CM

## 2018-07-16 LAB
ALBUMIN SERPL BCP-MCNC: 3.9 G/DL (ref 3.5–5)
ALP SERPL-CCNC: 62 U/L (ref 46–116)
ALT SERPL W P-5'-P-CCNC: 38 U/L (ref 12–78)
ANION GAP SERPL CALCULATED.3IONS-SCNC: 6 MMOL/L (ref 4–13)
AST SERPL W P-5'-P-CCNC: 21 U/L (ref 5–45)
BASOPHILS # BLD AUTO: 0.08 THOUSANDS/ΜL (ref 0–0.1)
BASOPHILS NFR BLD AUTO: 1 % (ref 0–1)
BILIRUB SERPL-MCNC: 0.41 MG/DL (ref 0.2–1)
BUN SERPL-MCNC: 14 MG/DL (ref 5–25)
CALCIUM SERPL-MCNC: 9.7 MG/DL (ref 8.3–10.1)
CHLORIDE SERPL-SCNC: 103 MMOL/L (ref 100–108)
CO2 SERPL-SCNC: 29 MMOL/L (ref 21–32)
CREAT SERPL-MCNC: 1.01 MG/DL (ref 0.6–1.3)
EOSINOPHIL # BLD AUTO: 0.03 THOUSAND/ΜL (ref 0–0.61)
EOSINOPHIL NFR BLD AUTO: 0 % (ref 0–6)
ERYTHROCYTE [DISTWIDTH] IN BLOOD BY AUTOMATED COUNT: 13.6 % (ref 11.6–15.1)
GFR SERPL CREATININE-BSD FRML MDRD: 81 ML/MIN/1.73SQ M
GLUCOSE SERPL-MCNC: 114 MG/DL (ref 65–140)
HCT VFR BLD AUTO: 42.7 % (ref 36.5–49.3)
HGB BLD-MCNC: 14.3 G/DL (ref 12–17)
LYMPHOCYTES # BLD AUTO: 1.89 THOUSANDS/ΜL (ref 0.6–4.47)
LYMPHOCYTES NFR BLD AUTO: 27 % (ref 14–44)
MCH RBC QN AUTO: 30.2 PG (ref 26.8–34.3)
MCHC RBC AUTO-ENTMCNC: 33.5 G/DL (ref 31.4–37.4)
MCV RBC AUTO: 90 FL (ref 82–98)
MONOCYTES # BLD AUTO: 0.64 THOUSAND/ΜL (ref 0.17–1.22)
MONOCYTES NFR BLD AUTO: 9 % (ref 4–12)
NEUTROPHILS # BLD AUTO: 4.46 THOUSANDS/ΜL (ref 1.85–7.62)
NEUTS SEG NFR BLD AUTO: 63 % (ref 43–75)
NRBC BLD AUTO-RTO: 0 /100 WBCS
PLATELET # BLD AUTO: 312 THOUSANDS/UL (ref 149–390)
PMV BLD AUTO: 9.7 FL (ref 8.9–12.7)
POTASSIUM SERPL-SCNC: 4.1 MMOL/L (ref 3.5–5.3)
PROT SERPL-MCNC: 7.9 G/DL (ref 6.4–8.2)
RBC # BLD AUTO: 4.74 MILLION/UL (ref 3.88–5.62)
SODIUM SERPL-SCNC: 138 MMOL/L (ref 136–145)
TROPONIN I SERPL-MCNC: <0.02 NG/ML
WBC # BLD AUTO: 7.1 THOUSAND/UL (ref 4.31–10.16)

## 2018-07-16 PROCEDURE — 99285 EMERGENCY DEPT VISIT HI MDM: CPT

## 2018-07-16 PROCEDURE — 99222 1ST HOSP IP/OBS MODERATE 55: CPT | Performed by: INTERNAL MEDICINE

## 2018-07-16 PROCEDURE — 36415 COLL VENOUS BLD VENIPUNCTURE: CPT | Performed by: EMERGENCY MEDICINE

## 2018-07-16 PROCEDURE — 70551 MRI BRAIN STEM W/O DYE: CPT

## 2018-07-16 PROCEDURE — 85025 COMPLETE CBC W/AUTO DIFF WBC: CPT | Performed by: EMERGENCY MEDICINE

## 2018-07-16 PROCEDURE — 96360 HYDRATION IV INFUSION INIT: CPT

## 2018-07-16 PROCEDURE — 71046 X-RAY EXAM CHEST 2 VIEWS: CPT

## 2018-07-16 PROCEDURE — 93005 ELECTROCARDIOGRAM TRACING: CPT

## 2018-07-16 PROCEDURE — 70498 CT ANGIOGRAPHY NECK: CPT

## 2018-07-16 PROCEDURE — 84484 ASSAY OF TROPONIN QUANT: CPT | Performed by: EMERGENCY MEDICINE

## 2018-07-16 PROCEDURE — 70496 CT ANGIOGRAPHY HEAD: CPT

## 2018-07-16 PROCEDURE — 80053 COMPREHEN METABOLIC PANEL: CPT | Performed by: EMERGENCY MEDICINE

## 2018-07-16 RX ORDER — ASPIRIN 81 MG/1
81 TABLET, CHEWABLE ORAL DAILY
Status: DISCONTINUED | OUTPATIENT
Start: 2018-07-17 | End: 2018-07-17 | Stop reason: HOSPADM

## 2018-07-16 RX ORDER — MECLIZINE HCL 12.5 MG/1
12.5 TABLET ORAL EVERY 8 HOURS PRN
Status: DISCONTINUED | OUTPATIENT
Start: 2018-07-16 | End: 2018-07-17 | Stop reason: HOSPADM

## 2018-07-16 RX ORDER — MECLIZINE HCL 12.5 MG/1
25 TABLET ORAL ONCE
Status: COMPLETED | OUTPATIENT
Start: 2018-07-16 | End: 2018-07-16

## 2018-07-16 RX ORDER — PRAVASTATIN SODIUM 80 MG/1
80 TABLET ORAL
Status: DISCONTINUED | OUTPATIENT
Start: 2018-07-16 | End: 2018-07-17 | Stop reason: HOSPADM

## 2018-07-16 RX ORDER — HYDROCHLOROTHIAZIDE 12.5 MG/1
12.5 TABLET ORAL DAILY
Status: DISCONTINUED | OUTPATIENT
Start: 2018-07-16 | End: 2018-07-17 | Stop reason: HOSPADM

## 2018-07-16 RX ORDER — ACETAMINOPHEN 325 MG/1
650 TABLET ORAL ONCE
Status: COMPLETED | OUTPATIENT
Start: 2018-07-16 | End: 2018-07-16

## 2018-07-16 RX ORDER — LISINOPRIL 20 MG/1
20 TABLET ORAL DAILY
Status: DISCONTINUED | OUTPATIENT
Start: 2018-07-16 | End: 2018-07-17 | Stop reason: HOSPADM

## 2018-07-16 RX ORDER — PANTOPRAZOLE SODIUM 40 MG/1
40 TABLET, DELAYED RELEASE ORAL
Status: DISCONTINUED | OUTPATIENT
Start: 2018-07-17 | End: 2018-07-17 | Stop reason: HOSPADM

## 2018-07-16 RX ORDER — ASPIRIN 81 MG/1
324 TABLET, CHEWABLE ORAL ONCE
Status: COMPLETED | OUTPATIENT
Start: 2018-07-16 | End: 2018-07-16

## 2018-07-16 RX ORDER — ONDANSETRON 2 MG/ML
4 INJECTION INTRAMUSCULAR; INTRAVENOUS EVERY 6 HOURS PRN
Status: DISCONTINUED | OUTPATIENT
Start: 2018-07-16 | End: 2018-07-17 | Stop reason: HOSPADM

## 2018-07-16 RX ADMIN — MECLIZINE 12.5 MG: 12.5 TABLET ORAL at 13:55

## 2018-07-16 RX ADMIN — SODIUM CHLORIDE 1000 ML: 0.9 INJECTION, SOLUTION INTRAVENOUS at 09:26

## 2018-07-16 RX ADMIN — ACETAMINOPHEN 650 MG: 325 TABLET, FILM COATED ORAL at 12:30

## 2018-07-16 RX ADMIN — HYDROCHLOROTHIAZIDE 12.5 MG: 12.5 TABLET ORAL at 19:15

## 2018-07-16 RX ADMIN — MECLIZINE 25 MG: 12.5 TABLET ORAL at 09:24

## 2018-07-16 RX ADMIN — LISINOPRIL 20 MG: 20 TABLET ORAL at 19:15

## 2018-07-16 RX ADMIN — ASPIRIN 81 MG 324 MG: 81 TABLET ORAL at 13:07

## 2018-07-16 RX ADMIN — PRAVASTATIN SODIUM 80 MG: 80 TABLET ORAL at 18:05

## 2018-07-16 RX ADMIN — IOHEXOL 85 ML: 350 INJECTION, SOLUTION INTRAVENOUS at 09:32

## 2018-07-16 NOTE — ED PROVIDER NOTES
History  Chief Complaint   Patient presents with    Dizziness     woke w/dizziness this morning  states he "feels off" and that he has never had dizziness like this before  Denies CP/SOB/nausea/fever/chills  denies headache     A 62-year-old male with past history of hypertension, hyperlipidemia and GERD; presents with dizziness since waking up this morning  Patient states he went to bed last evening in his usual state of health  Patient describes the dizziness as "feeling off"  Patient states the symptoms have been unchanged since waking up  Dizziness is worse with changes in position and movement of the head  Patient denies associated headache, blurred vision, sinus congestion, ear pain, neck pain, back pain, chest pain, shortness of breath, abdominal pain, nausea, vomiting, diarrhea, peripheral edema, paresthesias and weakness  Patient has never had similar symptoms  Patient denies recent trauma  Patient did not take anything prior to coming to the emergency department  A/P:  Dizziness, patient currently resting comfortably  Symptoms are described as a possible peripheral vertical source however no nystagmus is appreciated on exam   Patient also has significant difficulty with tandem gait  Remainder of neuro exam in [facility]  Cardiac lab work has already been obtained by first nurse protocol and reviewed  Will obtain CTA head and neck for further evaluation of possible posterior circulation CVA versus dissection  Will give IV fluids and meclizine for symptomatic relief and re-assess  History provided by:  Patient      Prior to Admission Medications   Prescriptions Last Dose Informant Patient Reported?  Taking?   lisinopril-hydrochlorothiazide (PRINZIDE,ZESTORETIC) 20-12 5 MG per tablet 7/15/2018 at Unknown time  No Yes   Sig: Take 1 tablet by mouth daily   omeprazole (PriLOSEC) 20 mg delayed release capsule 7/16/2018 at Unknown time  No Yes   Sig: Take 1 capsule (20 mg total) by mouth daily sildenafil (VIAGRA) 100 mg tablet Unknown at Unknown time  No No   Sig: Take 1 tablet (100 mg total) by mouth as needed for erectile dysfunction   simvastatin (ZOCOR) 40 mg tablet 7/15/2018 at Unknown time  No Yes   Sig: Take 1 tablet (40 mg total) by mouth daily      Facility-Administered Medications: None       Past Medical History:   Diagnosis Date    Allergic rhinitis     Cholelithiasis     Decreased testosterone level in male     ED (erectile dysfunction)     Fatty liver     Gastritis     GERD (gastroesophageal reflux disease)     Hyperlipidemia     Hypertension     Hypogonadotropic hypogonadism ([address] )     Vitamin D deficiency        Past Surgical History:   Procedure Laterality Date    TONSILLECTOMY         Family History   Problem Relation Age of Onset    Hypertension Mother     Kidney failure Mother     Hypertension Father     Hypertension Brother     Heart attack Brother      I have reviewed and agree with the history as documented  Social History   Substance Use Topics    Smoking status: Never Smoker    Smokeless tobacco: Former User     Types: Chew    Alcohol use Yes      Comment: weekly        Review of Systems   Neurological: Positive for dizziness  All other systems reviewed and are negative  Physical Exam  Physical Exam  General Appearance: alert and oriented, nad, non toxic appearing  Skin:  Warm, dry, intact  HEENT: atraumatic, normocephalic  PERRLA, EOMI, no nystagmus appreciated  Neck: Supple, trachea midline  Cardiac: RRR; no murmurs, rub, gallops  Pulmonary: lungs CTAB; no wheezes, rales, rhonchi  Gastrointestinal: abdomen soft, nontender, nondistended; no guarding or rebound tenderness; good bowel sounds, no mass or bruits  Extremities:  no pedal edema, 2+ pulses; no calf tenderness, no clubbing, no cyanosis  Neuro:  No focal motor or sensory deficits  CN 2-12 grossly intact  Normal finger to nose  No pronator drift  Normal gait    Unable to perform tandem gait   Psych:  Normal mood and affect, normal judgement and insight      Vital Signs  ED Triage Vitals   Temperature Pulse Respirations Blood Pressure SpO2   07/16/18 0759 07/16/18 0751 07/16/18 0751 07/16/18 0751 07/16/18 0751   (!) 97 3 °F (36 3 °C) 76 20 167/92 97 %      Temp Source Heart Rate Source Patient Position - Orthostatic VS BP Location FiO2 (%)   07/16/18 0759 07/16/18 0830 07/16/18 0830 07/16/18 0830 --   Oral Monitor Lying Right arm       Pain Score       07/16/18 0751       No Pain           Vitals:    07/16/18 1809 07/16/18 1930 07/16/18 2300 07/17/18 0749   BP: 162/94 140/78 113/71 123/78   Pulse: 83 80 75 70   Patient Position - Orthostatic VS: Standing - Orthostatic VS Sitting Lying Lying       Visual Acuity  Visual Acuity      Most Recent Value   L Pupil Size (mm)  3   R Pupil Size (mm)  3          ED Medications  Medications   pantoprazole (PROTONIX) EC tablet 40 mg (40 mg Oral Given 7/17/18 0554)   pravastatin (PRAVACHOL) tablet 80 mg (80 mg Oral Given 7/16/18 1805)   ondansetron (ZOFRAN) injection 4 mg (not administered)   aspirin chewable tablet 81 mg (81 mg Oral Given 7/17/18 0901)   enoxaparin (LOVENOX) subcutaneous injection 40 mg (40 mg Subcutaneous Not Given 7/17/18 0901)   meclizine (ANTIVERT) tablet 12 5 mg (12 5 mg Oral Given 7/16/18 1355)   lisinopril (ZESTRIL) tablet 20 mg (20 mg Oral Given 7/17/18 0901)   hydrochlorothiazide (HYDRODIURIL) tablet 12 5 mg (12 5 mg Oral Given 7/17/18 0901)   sodium chloride 0 9 % bolus 1,000 mL (0 mL Intravenous Stopped 7/16/18 1029)   meclizine (ANTIVERT) tablet 25 mg (25 mg Oral Given 7/16/18 0924)   iohexol (OMNIPAQUE) 350 MG/ML injection (MULTI-DOSE) 85 mL (85 mL Intravenous Given 7/16/18 0932)   acetaminophen (TYLENOL) tablet 650 mg (650 mg Oral Given 7/16/18 1230)   aspirin chewable tablet 324 mg (324 mg Oral Given 7/16/18 1307)       Diagnostic Studies  Results Reviewed     Procedure Component Value Units Date/Time    Troponin I [[id number]] (Normal) Collected:  07/16/18 0817    Lab Status:  Final result Specimen:  Blood from Arm, Left Updated:  07/16/18 0844     Troponin I <0 02 ng/mL     Comprehensive metabolic panel [[id number]] Collected:  07/16/18 0817    Lab Status:  Final result Specimen:  Blood from Arm, Left Updated:  07/16/18 0794     Sodium 138 mmol/L      Potassium 4 1 mmol/L      Chloride 103 mmol/L      CO2 29 mmol/L      Anion Gap 6 mmol/L      BUN 14 mg/dL      Creatinine 1 01 mg/dL      Glucose 114 mg/dL      Calcium 9 7 mg/dL      AST 21 U/L      ALT 38 U/L      Alkaline Phosphatase 62 U/L      Total Protein 7 9 g/dL      Albumin 3 9 g/dL      Total Bilirubin 0 41 mg/dL      eGFR 81 ml/min/1 73sq m     Narrative:         National Kidney Disease Education Program recommendations are as follows:  GFR calculation is accurate only with a steady state creatinine  Chronic Kidney disease less than 60 ml/min/1 73 sq  meters  Kidney failure less than 15 ml/min/1 73 sq  meters      CBC and differential [[id number]] Collected:  07/16/18 0817    Lab Status:  Final result Specimen:  Blood from Arm, Left Updated:  07/16/18 0826     WBC 7 10 Thousand/uL      RBC 4 74 Million/uL      Hemoglobin 14 3 g/dL      Hematocrit 42 7 %      MCV 90 fL      MCH 30 2 pg      MCHC 33 5 g/dL      RDW 13 6 %      MPV 9 7 fL      Platelets 672 Thousands/uL      nRBC 0 /100 WBCs      Neutrophils Relative 63 %      Lymphocytes Relative 27 %      Monocytes Relative 9 %      Eosinophils Relative 0 %      Basophils Relative 1 %      Neutrophils Absolute 4 46 Thousands/µL      Lymphocytes Absolute 1 89 Thousands/µL      Monocytes Absolute 0 64 Thousand/µL      Eosinophils Absolute 0 03 Thousand/µL      Basophils Absolute 0 08 Thousands/µL                  MRI brain wo contrast   Final Result by Andie Shea MD (07/16 1623)   Several tiny nonspecific primarily subcortical T2 and FLAIR hyperintense foci involving frontal and parietal lobes bilaterally, likely represent mild chronic microangiopathic changes such as may accompany migraine headaches  Findings are stable  No acute pathology confirmed by diffusion imaging      Mild residual paranasal sinus mucosal thickening without air-fluid level, improved         Workstation performed: [id number]         X-ray chest 2 views   ED Interpretation by Judy Canas DO (07/16 0457)   No acute disease      Final Result by Rayne Bedoya DO (07/16 1015)      No acute cardiopulmonary disease  Workstation performed: [id number]         CTA head and neck with and without contrast   Final Result by Dominique Robbins DO (07/16 1005)      Unremarkable CT of the brain without contrast with no signs of acute intracranial pathology  Unremarkable CT angiography of the head and neck  No atherosclerotic disease  No evidence of vascular malformation or dissection  Findings were directly discussed with on Current Date  Workstation performed: [id number]                    Procedures  Procedures   ECG 12 Lead Documentation  Date/Time: today/date: 7/17/2018  Performed by: Mali Sawyer    ECG reviewed by me, the ED Provider: yes    Patient location:  ED   Previous ECG:  No old for comparison    Rate:  79  ECG rate assessment: normal    Rhythm: sinus rhythm    Ectopy:  none    QRS axis:  Normal  Intervals: normal   Q waves: None   ST segments:  Normal  T waves: flattening in III      Impression: Normal EKG        Phone Contacts  ED Phone Contact    ED Course  ED Course as of Jul 17 1201   Mon Jul 16, 2018   1011 Negative for acute findings CTA head and neck with and without contrast   1048 Patient reports improvement in dizziness  Patient now has normal gait however continues to have some difficulty with tandem gait  Will discuss with neurology  46 Third call placed to neurology    5 Spoke with Dr Doss Backbone regarding symptoms and persistent difficulty with tandem gait    Agrees with low suspicion for CVA, however admitting to stroke pathway for MRI is appropriate  Will discuss with LILIANE  Pt updated and in agreement with plan  Pt also now complaining of a headache  No changes to neuro exam at this time  Will give tylenol  Stroke Assessment     Row Name 07/16/18 1256             NIH Stroke Scale    Interval Baseline      Level of Consciousness (1a ) 0      LOC Questions (1b ) 0      LOC Commands (1c ) 0      Best Gaze (2 ) 0      Visual (3 ) 0      Facial Palsy (4 ) 0      Motor Arm, Left (5a ) 0      Motor Arm, Right (5b ) 0      Motor Leg, Left (6a ) 0      Motor Leg, Right (6b ) 0      Limb Ataxia (7 ) 0      Sensory (8 ) 0      Best Language (9 ) 0      Dysarthria (10 ) 0      Extinction and Inattention (11 ) (Formerly Neglect) 0      Total 0                First Filed Value   TPA Decision  Patient not a TPA candidate  Patient is not a candidate options  Unclear time of onset outside appropriate time window  [Low suspicion for CVA  NIH score of 0]                        MDM  CritCare Time    Disposition  Final diagnoses:   Dizziness     Time reflects when diagnosis was documented in both MDM as applicable and the Disposition within this note     Time User Action Codes Description Comment    7/16/2018  1:36 PM Geoff, [address] [R42] Dizziness       ED Disposition     ED Disposition Condition Comment    Admit  Case was discussed with LILIANE and the patient's admission status was agreed to be Admission Status: inpatient status to the service of Dr Loraine Sweeney           Follow-up Information     Follow up With Specialties Details Why Contact Sangeeta Shaw DO Family Medicine Follow up in 1 week(s)  [address]  [phone]            Discharge Medication List as of 7/17/2018 10:50 AM      CONTINUE these medications which have NOT CHANGED    Details   lisinopril-hydrochlorothiazide (PRINZIDE,ZESTORETIC) 20-12 5 MG per tablet Take 1 tablet by mouth daily, Starting Fri 3/23/2018, Normal      omeprazole (PriLOSEC) 20 mg delayed release capsule Take 1 capsule (20 mg total) by mouth daily, Starting Fri 3/23/2018, Normal      simvastatin (ZOCOR) 40 mg tablet Take 1 tablet (40 mg total) by mouth daily, Starting Fri 3/23/2018, Normal      sildenafil (VIAGRA) 100 mg tablet Take 1 tablet (100 mg total) by mouth as needed for erectile dysfunction, Starting Wed 6/27/2018, Normal             Outpatient Discharge Orders  Discharge Diet     Activity as tolerated         ED Provider  Electronically Signed by           90Saul Gipson,   07/17/18 3156

## 2018-07-16 NOTE — PLAN OF CARE
METABOLIC, FLUID AND ELECTROLYTES - ADULT     Electrolytes maintained within normal limits Progressing     Fluid balance maintained Progressing        NEUROSENSORY - ADULT     Achieves stable or improved neurological status Progressing     Achieves maximal functionality and self care Progressing

## 2018-07-16 NOTE — H&P
History and Physical - [facility]    Patient Information: Shreyas Carter 60 y o  male MRN: [id number]  Unit/Bed#: ED 23 Encounter: [id number]  Admitting Physician: Donis Smith MD  PCP: Marie Pickett DO  Date of Admission:  07/16/18    Assessment/Plan:  1  Dizziness  2  HTN    -CTA head and neck are without acute abnormality  Will r/o CVA with MRI  Stroke pathway for now  Check orthostatics  Check EKG  PT/OT eval  On home statin, was given ASA  Hold BP meds for now  Further recs pending MRI  VTE Prophylaxis: Enoxaparin (Lovenox)    Code Status: Full     Anticipated Length of Stay:  Patient will be admitted on an Inpatient basis with an anticipated length of stay of  atleast 2 midnights  Justification for Hospital Stay: Dizziness    Total Time for Visit, including Counseling / Coordination of Care: 30 minutes  Greater than 50% of this total time spent on direct patient counseling and coordination of care  Chief Complaint:   Dizziness    History of Present Illness:    Shreyas Carter is a 60 y o  male who presents with dizziness  Patient states he woke up today and had dizziness/lightheadedness  Denies vertigo symptoms  States that was constant for a few hours and was relieved in the ER when given meclizine  He states he had a few very short lasting episodes of dizziness this past week  Prior to that he has never had any issues  Patient states he had a mild headache that started in the ER which was relieved by Tylenol  Denies any hearing loss, tinnitus, vision issues  He denies any chest pain or shortness of breath  He denies any recent viral illnesses  States has been in good health  He denies any weakness, numbness or tingling of any extremities  Denies slurred speech  In the ER was having issues with tandem gait  Patient is being admitted for further workup      Review of Systems:    Review of Systems  As per HPI otherwise negative       Past Medical and Surgical History:     Past Medical History:   Diagnosis Date    Hyperlipidemia     Hypertension        History reviewed  No pertinent surgical history  Meds/Allergies:    Prior to Admission medications    Medication Sig Start Date End Date Taking? Authorizing Provider   lisinopril-hydrochlorothiazide (PRINZIDE,ZESTORETIC) 20-12 5 MG per tablet Take 1 tablet by mouth daily 3/23/18  Yes Mollie Music, DO   omeprazole (PriLOSEC) 20 mg delayed release capsule Take 1 capsule (20 mg total) by mouth daily 3/23/18  Yes Mollie Music, DO   simvastatin (ZOCOR) 40 mg tablet Take 1 tablet (40 mg total) by mouth daily 3/23/18  Yes Mollie Music, DO   sildenafil (VIAGRA) 100 mg tablet Take 1 tablet (100 mg total) by mouth as needed for erectile dysfunction 6/27/18   Jessica Park DO     I have reviewed home medications with a medical source (PCP, Pharmacy, other)  Allergies: Allergies   Allergen Reactions    Moxifloxacin        Social History:     Marital Status: /Civil Union   History   Alcohol Use    Yes     Comment: weekly     History   Smoking Status    Never Smoker   Smokeless Tobacco    Former User     History   Drug Use No       Family History:    non-contributory    Physical Exam:     Vitals:   Blood Pressure: 138/71 (07/16/18 1300)  Pulse: 68 (07/16/18 1300)  Temperature: (!) 97 3 °F (36 3 °C) (07/16/18 0759)  Temp Source: Oral (07/16/18 0759)  Respirations: 19 (07/16/18 1300)  Weight - Scale: 89 8 kg (198 lb) (07/16/18 0751)  SpO2: 99 % (07/16/18 1300)    Physical Exam    GEN: NAD  HEENT: PERRL  CARDIO: s1 s2 RRR  LUNGS: CTA  ABD: Soft, NT/ND  EXT: No edema       Additional Data:     Lab Results: I have personally reviewed pertinent reports          Results from last 7 days  Lab Units 07/16/18  0817   WBC Thousand/uL 7 10   HEMOGLOBIN g/dL 14 3   HEMATOCRIT % 42 7   PLATELETS Thousands/uL 312   NEUTROS PCT % 63   LYMPHS PCT % 27   MONOS PCT % 9   EOS PCT % 0       Results from last 7 days  Lab Units 07/16/18  0817   SODIUM mmol/L 138   POTASSIUM mmol/L 4 1   CHLORIDE mmol/L 103   CO2 mmol/L 29   BUN mg/dL 14   CREATININE mg/dL 1 01   CALCIUM mg/dL 9 7   TOTAL PROTEIN g/dL 7 9   BILIRUBIN TOTAL mg/dL 0 41   ALK PHOS U/L 62   ALT U/L 38   AST U/L 21   GLUCOSE RANDOM mg/dL 114           Imaging: I have personally reviewed pertinent reports  Cta Head And Neck With And Without Contrast    Result Date: 7/16/2018  Narrative: CTA NECK AND BRAIN WITH AND WITHOUT CONTRAST INDICATION: Vertigo, persistent, central dizziness COMPARISON:   None  TECHNIQUE:  Routine CT imaging of the Brain without contrast   Post contrast imaging was performed after administration of iodinated contrast through the neck and brain  Post contrast axial 0 625 mm images timed to opacify the arterial system  3D rendering was performed on an independent workstation  MIP reconstructions performed  Coronal reconstructions were performed of the noncontrast portion of the brain  Radiation dose length product (DLP) for this visit:  1467 mGy-cm   This examination, like all CT scans performed in the [facility], was performed utilizing techniques to minimize radiation dose exposure, including the use of iterative reconstruction and automated exposure control  IV Contrast:  85 mL of iohexol (OMNIPAQUE)  IMAGE QUALITY:   Diagnostic FINDINGS: NONCONTRAST BRAIN PARENCHYMA:  No intracranial mass, mass effect or midline shift  No acute intracranial hemorrhage  No CT signs of acute infarction  VENTRICLES AND EXTRA-AXIAL SPACES:  Normal for patient's age  VISUALIZED ORBITS AND PARANASAL SINUSES:  Unremarkable  CALVARIUM AND EXTRACRANIAL SOFT TISSUES:   Normal  CERVICAL VASCULATURE AORTIC ARCH AND GREAT VESSELS:  Normal aortic arch and great vessel origins  Normal visualized subclavian vessels  RIGHT VERTEBRAL ARTERY CERVICAL SEGMENT:  Normal origin  The vessel is normal in caliber throughout the neck   LEFT VERTEBRAL ARTERY CERVICAL SEGMENT:  Normal origin  The vessel is normal in caliber throughout the neck  RIGHT EXTRACRANIAL CAROTID SEGMENT:  Normal common carotid artery  Evaluation of the bifurcation demonstrates slight short segment narrowing at the origin of the internal carotid artery without calcification or atherosclerotic disease, possibly anatomic  variation  LEFT EXTRACRANIAL CAROTID SEGMENT:  Normal caliber common carotid artery  Normal bifurcation and cervical internal carotid artery  No stenosis or dissection  NASCET criteria was used to determine the degree of internal carotid artery diameter stenosis  INTRACRANIAL VASCULATURE INTERNAL CAROTID ARTERIES:  Normal enhancement of the intracranial portions of the internal carotid arteries  Normal ophthalmic artery origins  Normal ICA terminus  ANTERIOR CIRCULATION:  Asymmetry of the A1 segments, left side dominant  Both are consistent in caliber without stenosis  Normal anterior communicating artery and more distal anterior cerebral arteries  MIDDLE CEREBRAL ARTERY CIRCULATION:  M1 segment and middle cerebral artery branches demonstrate normal enhancement bilaterally  DISTAL VERTEBRAL ARTERIES:  Normal distal vertebral arteries  Posterior inferior cerebellar artery origins are normal  Normal vertebral basilar junction  BASILAR ARTERY:  Basilar artery is normal in caliber  Normal superior cerebellar arteries  POSTERIOR CEREBRAL ARTERIES: Both posterior cerebral arteries arises from the basilar tip  Both arteries demonstrate normal enhancement  Normal posterior communicating arteries  DURAL VENOUS SINUSES:  Normal  NON VASCULAR ANATOMY BONY STRUCTURES:  No acute osseous abnormality  SOFT TISSUES OF THE NECK:  Heterogeneous nodule within the right lobe of the thyroid gland measuring 1 3 cm  THORACIC INLET:  Unremarkable  Impression: Unremarkable CT of the brain without contrast with no signs of acute intracranial pathology   Unremarkable CT angiography of the head and neck  No atherosclerotic disease  No evidence of vascular malformation or dissection  Findings were directly discussed with on Current Date  Workstation performed: [id number]     X-ray Chest 2 Views    Result Date: 7/16/2018  Narrative: CHEST INDICATION:   chest pain  Dizziness COMPARISON:  None EXAM PERFORMED/VIEWS:  XR CHEST PA & LATERAL Images: 2 FINDINGS: Heart shadow appears unremarkable  Atherosclerotic vascular calcifications are noted  The lungs are clear  No pneumothorax or pleural effusion  Osseous structures appear within normal limits for patient age  Impression: No acute cardiopulmonary disease  Workstation performed: [id number]       EKG, Pathology, and Other Studies Reviewed on Admission:   · EKG: Pending     Allscripts / Epic Records Reviewed: Yes     ** Please Note: This note has been constructed using a voice recognition system   **

## 2018-07-17 VITALS
SYSTOLIC BLOOD PRESSURE: 123 MMHG | OXYGEN SATURATION: 96 % | RESPIRATION RATE: 18 BRPM | HEART RATE: 70 BPM | TEMPERATURE: 97.7 F | WEIGHT: 198 LBS | BODY MASS INDEX: 27.04 KG/M2 | DIASTOLIC BLOOD PRESSURE: 78 MMHG

## 2018-07-17 LAB
ATRIAL RATE: 78 BPM
ATRIAL RATE: 79 BPM
CHOLEST SERPL-MCNC: 136 MG/DL (ref 50–200)
EST. AVERAGE GLUCOSE BLD GHB EST-MCNC: 105 MG/DL
HBA1C MFR BLD: 5.3 % (ref 4.2–6.3)
HDLC SERPL-MCNC: 47 MG/DL (ref 40–60)
LDLC SERPL CALC-MCNC: 60 MG/DL (ref 0–100)
P AXIS: 39 DEGREES
P AXIS: 53 DEGREES
PR INTERVAL: 154 MS
PR INTERVAL: 164 MS
QRS AXIS: 72 DEGREES
QRS AXIS: 73 DEGREES
QRSD INTERVAL: 88 MS
QRSD INTERVAL: 90 MS
QT INTERVAL: 362 MS
QT INTERVAL: 368 MS
QTC INTERVAL: 412 MS
QTC INTERVAL: 421 MS
T WAVE AXIS: 17 DEGREES
T WAVE AXIS: 27 DEGREES
TRIGL SERPL-MCNC: 143 MG/DL
VENTRICULAR RATE: 78 BPM
VENTRICULAR RATE: 79 BPM

## 2018-07-17 PROCEDURE — 93010 ELECTROCARDIOGRAM REPORT: CPT | Performed by: INTERNAL MEDICINE

## 2018-07-17 PROCEDURE — 80061 LIPID PANEL: CPT | Performed by: INTERNAL MEDICINE

## 2018-07-17 PROCEDURE — 83036 HEMOGLOBIN GLYCOSYLATED A1C: CPT | Performed by: INTERNAL MEDICINE

## 2018-07-17 PROCEDURE — 99239 HOSP IP/OBS DSCHRG MGMT >30: CPT | Performed by: INTERNAL MEDICINE

## 2018-07-17 RX ADMIN — HYDROCHLOROTHIAZIDE 12.5 MG: 12.5 TABLET ORAL at 09:01

## 2018-07-17 RX ADMIN — ASPIRIN 81 MG 81 MG: 81 TABLET ORAL at 09:01

## 2018-07-17 RX ADMIN — LISINOPRIL 20 MG: 20 TABLET ORAL at 09:01

## 2018-07-17 RX ADMIN — PANTOPRAZOLE SODIUM 40 MG: 40 TABLET, DELAYED RELEASE ORAL at 05:54

## 2018-07-17 NOTE — PROGRESS NOTES
Pt discharged to home without VNA needs  A&Ox4, able to identify needs  Independent w/ ADL's  Denies any pain, dizziness, or SOB  D/C summary explained and provided to pt w/ significant other at side  Extensive education given on CVA risk factors and prevention, med  Compliance, diet and exercise, and f/u apt's  Pt and significant other both receptive and compliant  No scripts needed  Peripheral IV removed and pressure dsg applied  All belongings verified upon d/c and returned w/ pt  Pt ambulated self out of facility w/ significant other at side  Personal transport provided

## 2018-07-17 NOTE — PLAN OF CARE
METABOLIC, FLUID AND ELECTROLYTES - ADULT     Electrolytes maintained within normal limits Completed     Fluid balance maintained Completed        NEUROSENSORY - ADULT     Achieves stable or improved neurological status Completed     Achieves maximal functionality and self care Completed

## 2018-07-17 NOTE — DISCHARGE SUMMARY
Discharge Summary - [facility]    Patient Information: Alicia Shau 60 y o  male MRN: [id number]  Unit/Bed#: E4 -01 Encounter: [id number]    Discharging Physician / Practitioner: Nereyda Abraham MD  PCP: Oscar Licea DO  Admission Date: 7/16/2018  Discharge Date: 07/17/18    Disposition:     Home    Reason for Admission: Dizziness    Discharge Diagnoses:     Principal Problem:    Dizzy  Resolved Problems:    * No resolved hospital problems  *      Consultations During Hospital Stay:  · none    Procedures Performed:     · none    Significant Findings / Test Results:     · none    Incidental Findings:   · none     Test Results Pending at Discharge (will require follow up):   · none     Outpatient Tests Requested:  · none    Complications:  none    Hospital Course:     Aliica Sahu is a 60 y o  male who presents with dizziness  Patient states he woke up today and had dizziness/lightheadedness  Denies vertigo symptoms  States that was constant for a few hours and was relieved in the ER when given meclizine  He states he had a few very short lasting episodes of dizziness this past week  Prior to that he has never had any issues  Patient states he had a mild headache that started in the ER which was relieved by Tylenol  Denies any hearing loss, tinnitus, vision issues  He denies any chest pain or shortness of breath  He denies any recent viral illnesses  States has been in good health  He denies any weakness, numbness or tingling of any extremities  Denies slurred speech  In the ER was having issues with tandem gait  Patient is CT of the head and neck which was unremarkable  Patient had MRI of the brain which showed no stroke  There were some areas noted on MRI and this was discussed with Neurology and were deemed nonsignificant and no further workup was needed  Patient's symptoms resolved with meclizine in the ER and had no further symptoms    Patient felt better was okay for discharge home  Condition at Discharge: good     Discharge Day Visit / Exam:     Subjective:  No complaints  Dizziness resolved  Vitals: Blood Pressure: 123/78 (07/17/18 0749)  Pulse: 70 (07/17/18 0749)  Temperature: 97 7 °F (36 5 °C) (07/17/18 0749)  Temp Source: Temporal (07/17/18 0749)  Respirations: 18 (07/17/18 0749)  Weight - Scale: 89 8 kg (198 lb) (07/16/18 0751)  SpO2: 96 % (07/17/18 0749)  Exam:   Physical Exam  GEN: NAD  HEENT: PERRL  CARDIO: s1 s2 RRR  LUNGS: CTA  ABD: Soft, NT/ND  EXT: No edema       Discharge instructions/Information to patient and family:   See after visit summary for information provided to patient and family  Provisions for Follow-Up Care:  See after visit summary for information related to follow-up care and any pertinent home health orders  Discharge Statement:  I spent 32 minutes discharging the patient  This time was spent on the day of discharge  I had direct contact with the patient on the day of discharge  Greater than 50% of the total time was spent examining patient, answering all patient questions, arranging and discussing plan of care with patient as well as directly providing post-discharge instructions  Additional time then spent on discharge activities  Discharge Medications:  See after visit summary for reconciled discharge medications provided to patient and family        ** Please Note: This note has been constructed using a voice recognition system **

## 2018-07-17 NOTE — CASE MANAGEMENT
Initial Clinical Review    Admission: Date/Time/Statement: 7/16/18 @ 1330     Orders Placed This Encounter   Procedures    Inpatient Admission     Standing Status:   Standing     Number of Occurrences:   1     Order Specific Question:   Admitting Physician     Answer:   Syl Renner     Order Specific Question:   Level of Care     Answer:   Med Surg [16]     Order Specific Question:   Estimated length of stay     Answer:   Not Applicable       ED: Date/Time/Mode of Arrival:   ED Arrival Information     Expected Arrival Acuity Means of Arrival Escorted By Service Admission Type    - 7/16/2018 07:45 Urgent Wheelchair Family Member General Medicine Urgent    Arrival Complaint    HBP,DIZZINESS        Chief Complaint:   Chief Complaint   Patient presents with    Dizziness     woke w/dizziness this morning  states he "feels off" and that he has never had dizziness like this before  Denies CP/SOB/nausea/fever/chills  denies headache       History of Illness: 62-year-old male with past history of hypertension, hyperlipidemia and GERD; presents with dizziness since waking up this morning  Patient states he went to bed last evening in his usual state of health  Patient describes the dizziness as "feeling off"  Patient states the symptoms have been unchanged since waking up  Dizziness is worse with changes in position and movement of the head  Patient denies associated headache, blurred vision, sinus congestion, ear pain, neck pain, back pain, chest pain, shortness of breath, abdominal pain, nausea, vomiting, diarrhea, peripheral edema, paresthesias and weakness  Patient has never had similar symptoms  Patient denies recent trauma  Patient did not take anything prior to coming to the emergency department  A/P:  Dizziness, patient currently resting comfortably    Symptoms are described as a possible peripheral vertical source however no nystagmus is appreciated on exam   Patient also has significant difficulty with tandem gait  Remainder of neuro exam in [facility]  Cardiac lab work has already been obtained by first nurse protocol and reviewed  Will obtain CTA head and neck for further evaluation of possible posterior circulation CVA versus dissection  Will give IV fluids and meclizine for symptomatic relief and re-assess  ED Vital Signs:   ED Triage Vitals   Temperature Pulse Respirations Blood Pressure SpO2   07/16/18 0759 07/16/18 0751 07/16/18 0751 07/16/18 0751 07/16/18 0751   (!) 97 3 °F (36 3 °C) 76 20 167/92 97 %      Temp Source Heart Rate Source Patient Position - Orthostatic VS BP Location FiO2 (%)   07/16/18 0759 07/16/18 0830 07/16/18 0830 07/16/18 0830 --   Oral Monitor Lying Right arm       Pain Score       07/16/18 0751       No Pain        Wt Readings from Last 1 Encounters:   07/16/18 89 8 kg (198 lb)     Abnormal Labs/Diagnostic Test Results: CBC & CMP WNL'S  CXR: No acute cardiopulmonary disease  CTA Head & Neck: Unremarkable CT of the brain without contrast with no signs of acute intracranial pathology  Unremarkable CT angiography of the head and neck   No atherosclerotic disease   No evidence of vascular malformation or dissection      ED Treatment:   Medication Administration from 07/16/2018 0745 to 07/16/2018 1434       Date/Time Order Dose Route Action Action by Comments     07/16/2018 1029 sodium chloride 0 9 % bolus 1,000 mL 0 mL Intravenous Stopped       07/16/2018 0926 sodium chloride 0 9 % bolus 1,000 mL 1,000 mL Intravenous New Bag       07/16/2018 0924 meclizine (ANTIVERT) tablet 25 mg 25 mg Oral Given       07/16/2018 0932 iohexol (OMNIPAQUE) 350 MG/ML injection (MULTI-DOSE) 85 mL 85 mL Intravenous Given       07/16/2018 1230 acetaminophen (TYLENOL) tablet 650 mg 650 mg Oral Given       07/16/2018 1307 aspirin chewable tablet 324 mg 324 mg Oral Given       07/16/2018 1355 meclizine (ANTIVERT) tablet 12 5 mg 12 5 mg Oral Given            Past Medical/Surgical History: Active Ambulatory Problems     Diagnosis Date Noted    No Active Ambulatory Problems     Resolved Ambulatory Problems     Diagnosis Date Noted    No Resolved Ambulatory Problems     Past Medical History:   Diagnosis Date    Allergic rhinitis     Cholelithiasis     Decreased testosterone level in male     ED (erectile dysfunction)     Fatty liver     Gastritis     GERD (gastroesophageal reflux disease)     Hyperlipidemia     Hypertension     Hypogonadotropic hypogonadism (HCC)     Vitamin D deficiency        Admitting Diagnosis: Dizziness [R42]  Dizzy [R42]    Age/Sex: 60 y o  male    Assessment/Plan:   1  Dizziness  2  HTN     -CTA head and neck are without acute abnormality  Will r/o CVA with MRI  Stroke pathway for now  Check orthostatics  Check EKG  PT/OT eval  On home statin, was given ASA  Hold BP meds for now  Further recs pending MRI        VTE Prophylaxis: Enoxaparin (Lovenox)    Code Status: Full      Anticipated Length of Stay:  Patient will be admitted on an Inpatient basis with an anticipated length of stay of  atleast 2 midnights  Justification for Hospital Stay: Dizziness    Admission Orders:  IP   TELE  Consult Neuro  Neuro Checks q1h x 4, q2h x 4, q4h  MRI  HgA1C  EKG  PT / OT Eval    Scheduled Meds:   Protonix po qd  Pravastatin po qd  ASA qd  Lovenox 40 sq q d  Lisinopril po qd  Hydrodiuril po qd  Zofran IV prn  Antivert prn x 1 7/16    MRI: Several tiny nonspecific primarily subcortical T2 and FLAIR hyperintense foci involving frontal and parietal lobes bilaterally, likely represent mild chronic microangiopathic changes such as may accompany migraine headaches  Findings are stable  No acute pathology confirmed by diffusion imaging  Mild residual paranasal sinus mucosal thickening without air-fluid level, improved    Discharged to Home 7/17/2018    Thank you,  Fritz Aqq  [facility] Utilization Review Department  Phone: [phone];  Fax [phone]  ATTENTION: The Network Utilization Review Department is now centralized for our 9 Facilities  Make a note that we have a new phone and fax numbers for our Department  Please call with any questions or concerns to [phone] and carefully follow the prompts so that you are directed to the right person  All voicemails are confidential  Fax any determinations, approvals, denials, and requests for initial or continue stay review clinical to [phone]  Due to HIGH CALL volume, it would be easier if you could please send faxed requests to expedite your requests and in part, help us provide discharge notifications faster

## 2018-07-17 NOTE — DISCHARGE INSTRUCTIONS
Dizziness, Ambulatory Care   GENERAL INFORMATION:   Dizziness  is a term used to describe a feeling of being off balance or unsteady  Common causes of dizziness are an inner ear fluid imbalance or a lack of oxygen in your blood  Your dizziness may be acute (lasts 3 days or less) or chronic (lasts longer than 3 days)  You may have dizzy spells that last from seconds to a few hours  Common symptoms related to dizziness include the following:   · A feeling that your surroundings are moving even though you are standing still    · Ringing in your ears or hearing loss     · Feeling faint or lightheaded     · Weakness or unsteadiness     · Double vision or eye movements you cannot control    · Nausea or vomiting     · Confusion  Seek immediate care for the following symptoms:   · You have a headache that does not go away with medicine  · You have shaking chills and a fever  · You vomit over and over with no relief  · Your vomit or bowel movements are red or black  · You have pain in your chest, back, or abdomen  · You have numbness, especially in your face, arms, or legs  · You have trouble moving your arms or legs  Treatment for dizziness  depends on the cause of your dizziness  You may need medicines to decrease your dizziness or nausea  You may need to be admitted to the hospital for treatment  Manage your symptoms:  Get up slowly from sitting or lying down  Do not drive or operate machinery when you are dizzy  Follow up with your healthcare provider as directed:  Write down your questions so you remember to ask them during your visits  CARE AGREEMENT:   You have the right to help plan your care  Learn about your health condition and how it may be treated  Discuss treatment options with your caregivers to decide what care you want to receive  You always have the right to refuse treatment  The above information is an  only   It is not intended as medical advice for individual conditions or treatments  Talk to your doctor, nurse or pharmacist before following any medical regimen to see if it is safe and effective for you  © 2014 [address] is for End User's use only and may not be sold, redistributed or otherwise used for commercial purposes  All illustrations and images included in CareNotes® are the copyrighted property of A D A M , Inc  or Manny Benavides

## 2018-07-18 ENCOUNTER — TRANSITIONAL CARE MANAGEMENT (OUTPATIENT)
Dept: FAMILY MEDICINE CLINIC | Facility: CLINIC | Age: 58
End: 2018-07-18

## 2018-07-19 ENCOUNTER — TRANSITIONAL CARE MANAGEMENT (OUTPATIENT)
Dept: FAMILY MEDICINE CLINIC | Facility: CLINIC | Age: 58
End: 2018-07-19

## 2018-07-23 ENCOUNTER — OFFICE VISIT (OUTPATIENT)
Dept: FAMILY MEDICINE CLINIC | Facility: CLINIC | Age: 58
End: 2018-07-23
Payer: COMMERCIAL

## 2018-07-23 VITALS
RESPIRATION RATE: 16 BRPM | WEIGHT: 204 LBS | SYSTOLIC BLOOD PRESSURE: 124 MMHG | HEART RATE: 80 BPM | DIASTOLIC BLOOD PRESSURE: 86 MMHG | BODY MASS INDEX: 27.63 KG/M2 | HEIGHT: 72 IN

## 2018-07-23 DIAGNOSIS — N52.9 MALE ERECTILE DISORDER: Primary | ICD-10-CM

## 2018-07-23 DIAGNOSIS — R42 DIZZY: ICD-10-CM

## 2018-07-23 DIAGNOSIS — E78.5 HYPERLIPIDEMIA, UNSPECIFIED HYPERLIPIDEMIA TYPE: ICD-10-CM

## 2018-07-23 DIAGNOSIS — I10 ESSENTIAL HYPERTENSION: ICD-10-CM

## 2018-07-23 DIAGNOSIS — N52.9 ERECTILE DYSFUNCTION, UNSPECIFIED ERECTILE DYSFUNCTION TYPE: ICD-10-CM

## 2018-07-23 PROCEDURE — 99496 TRANSJ CARE MGMT HIGH F2F 7D: CPT | Performed by: FAMILY MEDICINE

## 2018-07-23 PROCEDURE — 1111F DSCHRG MED/CURRENT MED MERGE: CPT | Performed by: FAMILY MEDICINE

## 2018-07-23 RX ORDER — MULTIVITAMIN
TABLET ORAL
COMMUNITY
End: 2020-03-02

## 2018-07-23 RX ORDER — FLUTICASONE PROPIONATE 50 MCG
2 SPRAY, SUSPENSION (ML) NASAL DAILY
COMMUNITY
Start: 2016-01-22

## 2018-07-23 RX ORDER — SILDENAFIL 100 MG/1
100 TABLET, FILM COATED ORAL AS NEEDED
Qty: 6 TABLET | Refills: 5 | Status: SHIPPED | OUTPATIENT
Start: 2018-07-23 | End: 2018-12-18 | Stop reason: SDUPTHER

## 2018-07-23 RX ORDER — VITAMIN B COMPLEX
CAPSULE ORAL
COMMUNITY

## 2018-07-23 RX ORDER — AMOXICILLIN 500 MG
CAPSULE ORAL
COMMUNITY

## 2018-07-23 RX ORDER — MAG HYDROX/ALUMINUM HYD/SIMETH 400-400-40
1 SUSPENSION, ORAL (FINAL DOSE FORM) ORAL DAILY
COMMUNITY

## 2018-07-23 NOTE — PROGRESS NOTES
Assessment/Plan:  Date and time hospital follow up call was made:  7/18/2018  1:09 PM  Patient was hopsital[facility] at:  [facility]  Date of admission:  7/16/18  Date of discharge:  7/17/18  Diagnosis:  Dizziness  Disposition:  Home  Were the patients medicaitons reviewed and updated:  No  Current symptoms:  None  Post hospital issues:  None  Should patient be enrolled in anticoag monitoring?:  No  Scheduled for follow up?:  Yes  Referrals needed:  NO specialists referred  Did you obtain your prescribed medications:  Yes  Do you need help managing your perscriptions or medications:  No  Is transportation to your appointments needed:  No  I have advised the patient to call PCP with any new or worsening symptoms (please type in name along with any credentials):  Alice Ortega CMA  Living Arrangements:  Spouse or Significiant other  Support System:  Family  The type of support provided:  Emotional, Physical  Do you have social support:  Yes, as much as I need  Are you recieving outpatient services:  No  Are you recieving home care services:  No  Are you using any community resources:  No  Have you fallen in the last 12 months:  No  Interperter language line required?:  No      Patient to continue present treatment  Patient to follow a low-fat and a low-salt diet and continue his regular exercise program   Return to the office in 6 months  Dizzy  Symptoms completely resolved  Increase fluids and discussed importance of good hydration  Diagnoses and all orders for this visit:    Male erectile disorder    Dizzy    Essential hypertension    Hyperlipidemia, unspecified hyperlipidemia type    Erectile dysfunction, unspecified erectile dysfunction type  -     sildenafil (VIAGRA) 100 mg tablet;  Take 1 tablet (100 mg total) by mouth as needed for erectile dysfunction    Other orders  -     fluticasone (FLONASE) 50 mcg/act nasal spray; 2 sprays into each nostril daily  -     Psyllium (METAMUCIL FIBER SINGLES PO); Take by mouth  -     Omega-3 Fatty Acids (FISH OIL) 1200 MG CAPS; by Does not apply route  -     Multiple Vitamin (MULTI VITAMIN DAILY) TABS; Take by mouth  -     b complex vitamins capsule; Take by mouth  -     Cholecalciferol (VITAMIN D3) 1000 units CAPS; Take by mouth          Subjective:      Patient ID: Ericka Watson is a 60 y o  male  Patient is here for follow-up appointment from recent hospitalization at [place] [address] from 07/16/2018 until 07/17/2018 for episode of dizziness  Patient was treated with 1 dose of meclizine in the emergency room and his symptoms have resolved ever since  Patient admitted to feeling of lightheadedness and somewhat off balance prior to hospitalization but denied true vertigo symptoms  Hospital records reviewed  Patient has been feeling well overall and admits to intentional 30 lb weight loss over the past 1 year through diet and exercise  Patient is up-to-date on colonoscopy  Dizziness   The current episode started 1 to 4 weeks ago  The problem occurs intermittently  The problem has been resolved  Pertinent negatives include no anorexia, change in bowel habit, chest pain, chills, diaphoresis, fatigue, fever, headaches, nausea, neck pain, numbness, rash, vertigo, visual change, vomiting or weakness  Treatments tried: meclizine  The treatment provided significant relief  The following portions of the patient's history were reviewed and updated as appropriate: allergies, current medications, past family history, past medical history, past social history, past surgical history and problem list     Review of Systems   Constitutional: Negative for chills, diaphoresis, fatigue and fever  Cardiovascular: Negative for chest pain  Gastrointestinal: Negative for anorexia, change in bowel habit, nausea and vomiting  Musculoskeletal: Negative for neck pain  Skin: Negative for rash  Neurological: Positive for dizziness   Negative for vertigo, weakness, numbness and headaches  Objective:      /86   Pulse 80   Resp 16   Ht 5' 11 5" (1 816 m)   Wt 92 5 kg (204 lb)   BMI 28 06 kg/m²          Physical Exam   Constitutional: He is oriented to person, place, and time  He appears well-developed and well-nourished  No distress  HENT:   Head: Normocephalic  Right Ear: External ear normal    Left Ear: External ear normal    Nose: Nose normal    Mouth/Throat: Oropharynx is clear and moist    Eyes: Conjunctivae are normal  No scleral icterus  Neck: Neck supple  No thyromegaly present  Cardiovascular: Normal rate and regular rhythm  Pulmonary/Chest: Effort normal and breath sounds normal    Abdominal: Soft  There is no tenderness  Musculoskeletal: He exhibits no edema  Lymphadenopathy:     He has no cervical adenopathy  Neurological: He is alert and oriented to person, place, and time  Skin: Skin is warm and dry  Psychiatric: He has a normal mood and affect

## 2018-07-31 DIAGNOSIS — N52.9 ERECTILE DYSFUNCTION, UNSPECIFIED ERECTILE DYSFUNCTION TYPE: ICD-10-CM

## 2018-09-19 DIAGNOSIS — E78.5 HYPERLIPIDEMIA, UNSPECIFIED HYPERLIPIDEMIA TYPE: ICD-10-CM

## 2018-09-19 DIAGNOSIS — I10 ESSENTIAL HYPERTENSION: ICD-10-CM

## 2018-09-19 DIAGNOSIS — K21.9 GASTROESOPHAGEAL REFLUX DISEASE WITHOUT ESOPHAGITIS: ICD-10-CM

## 2018-09-19 RX ORDER — SIMVASTATIN 40 MG
TABLET ORAL
Qty: 90 TABLET | Refills: 1 | Status: SHIPPED | OUTPATIENT
Start: 2018-09-19 | End: 2019-03-18 | Stop reason: SDUPTHER

## 2018-09-19 RX ORDER — LISINOPRIL AND HYDROCHLOROTHIAZIDE 20; 12.5 MG/1; MG/1
TABLET ORAL
Qty: 90 TABLET | Refills: 1 | Status: SHIPPED | OUTPATIENT
Start: 2018-09-19 | End: 2019-03-18 | Stop reason: SDUPTHER

## 2018-09-19 RX ORDER — OMEPRAZOLE 20 MG/1
CAPSULE, DELAYED RELEASE ORAL
Qty: 90 CAPSULE | Refills: 1 | Status: SHIPPED | OUTPATIENT
Start: 2018-09-19 | End: 2018-12-18 | Stop reason: SDUPTHER

## 2018-12-16 PROBLEM — R42 DIZZY: Status: RESOLVED | Noted: 2018-07-16 | Resolved: 2018-12-16

## 2018-12-18 ENCOUNTER — OFFICE VISIT (OUTPATIENT)
Dept: FAMILY MEDICINE CLINIC | Facility: CLINIC | Age: 58
End: 2018-12-18
Payer: COMMERCIAL

## 2018-12-18 VITALS
TEMPERATURE: 97.8 F | HEIGHT: 72 IN | BODY MASS INDEX: 27.5 KG/M2 | HEART RATE: 76 BPM | SYSTOLIC BLOOD PRESSURE: 124 MMHG | DIASTOLIC BLOOD PRESSURE: 80 MMHG | RESPIRATION RATE: 16 BRPM | WEIGHT: 203 LBS

## 2018-12-18 DIAGNOSIS — Z00.00 ANNUAL PHYSICAL EXAM: Primary | ICD-10-CM

## 2018-12-18 DIAGNOSIS — Z23 NEED FOR INFLUENZA VACCINATION: ICD-10-CM

## 2018-12-18 DIAGNOSIS — E55.9 VITAMIN D DEFICIENCY: ICD-10-CM

## 2018-12-18 DIAGNOSIS — Z12.5 SCREENING PSA (PROSTATE SPECIFIC ANTIGEN): ICD-10-CM

## 2018-12-18 DIAGNOSIS — I10 ESSENTIAL HYPERTENSION: ICD-10-CM

## 2018-12-18 DIAGNOSIS — K21.9 GASTROESOPHAGEAL REFLUX DISEASE WITHOUT ESOPHAGITIS: ICD-10-CM

## 2018-12-18 DIAGNOSIS — E78.5 HYPERLIPIDEMIA, UNSPECIFIED HYPERLIPIDEMIA TYPE: ICD-10-CM

## 2018-12-18 DIAGNOSIS — Z12.11 SCREENING FOR COLON CANCER: ICD-10-CM

## 2018-12-18 DIAGNOSIS — N52.9 ERECTILE DYSFUNCTION, UNSPECIFIED ERECTILE DYSFUNCTION TYPE: ICD-10-CM

## 2018-12-18 LAB
25(OH)D3 SERPL-MCNC: 54.1 NG/ML (ref 30–100)
ALBUMIN SERPL BCP-MCNC: 4 G/DL (ref 3.5–5)
ALP SERPL-CCNC: 72 U/L (ref 46–116)
ALT SERPL W P-5'-P-CCNC: 49 U/L (ref 12–78)
ANION GAP SERPL CALCULATED.3IONS-SCNC: 6 MMOL/L (ref 4–13)
AST SERPL W P-5'-P-CCNC: 27 U/L (ref 5–45)
BILIRUB SERPL-MCNC: 0.45 MG/DL (ref 0.2–1)
BILIRUB UR QL STRIP: NEGATIVE
BUN SERPL-MCNC: 20 MG/DL (ref 5–25)
CALCIUM SERPL-MCNC: 9.6 MG/DL (ref 8.3–10.1)
CHLORIDE SERPL-SCNC: 103 MMOL/L (ref 100–108)
CHOLEST SERPL-MCNC: 168 MG/DL (ref 50–200)
CLARITY UR: CLEAR
CO2 SERPL-SCNC: 28 MMOL/L (ref 21–32)
COLOR UR: YELLOW
CREAT SERPL-MCNC: 1.05 MG/DL (ref 0.6–1.3)
ERYTHROCYTE [DISTWIDTH] IN BLOOD BY AUTOMATED COUNT: 13 % (ref 11.6–15.1)
GFR SERPL CREATININE-BSD FRML MDRD: 77 ML/MIN/1.73SQ M
GLUCOSE P FAST SERPL-MCNC: 98 MG/DL (ref 65–99)
GLUCOSE UR STRIP-MCNC: NEGATIVE MG/DL
HCT VFR BLD AUTO: 44 % (ref 36.5–49.3)
HDLC SERPL-MCNC: 56 MG/DL (ref 40–60)
HGB BLD-MCNC: 14.3 G/DL (ref 12–17)
HGB UR QL STRIP.AUTO: NEGATIVE
KETONES UR STRIP-MCNC: NEGATIVE MG/DL
LDLC SERPL CALC-MCNC: 83 MG/DL (ref 0–100)
LEUKOCYTE ESTERASE UR QL STRIP: NEGATIVE
MCH RBC QN AUTO: 29.8 PG (ref 26.8–34.3)
MCHC RBC AUTO-ENTMCNC: 32.5 G/DL (ref 31.4–37.4)
MCV RBC AUTO: 92 FL (ref 82–98)
NITRITE UR QL STRIP: NEGATIVE
NONHDLC SERPL-MCNC: 112 MG/DL
PH UR STRIP.AUTO: 5.5 [PH] (ref 4.5–8)
PLATELET # BLD AUTO: 430 THOUSANDS/UL (ref 149–390)
PMV BLD AUTO: 10 FL (ref 8.9–12.7)
POTASSIUM SERPL-SCNC: 5 MMOL/L (ref 3.5–5.3)
PROT SERPL-MCNC: 8.6 G/DL (ref 6.4–8.2)
PROT UR STRIP-MCNC: NEGATIVE MG/DL
PSA SERPL-MCNC: 0.7 NG/ML (ref 0–4)
RBC # BLD AUTO: 4.8 MILLION/UL (ref 3.88–5.62)
SODIUM SERPL-SCNC: 137 MMOL/L (ref 136–145)
SP GR UR STRIP.AUTO: 1.02 (ref 1–1.03)
TRIGL SERPL-MCNC: 147 MG/DL
TSH SERPL DL<=0.05 MIU/L-ACNC: 2.44 UIU/ML (ref 0.36–3.74)
UROBILINOGEN UR QL STRIP.AUTO: 0.2 E.U./DL
WBC # BLD AUTO: 7.28 THOUSAND/UL (ref 4.31–10.16)

## 2018-12-18 PROCEDURE — 81003 URINALYSIS AUTO W/O SCOPE: CPT | Performed by: FAMILY MEDICINE

## 2018-12-18 PROCEDURE — 82306 VITAMIN D 25 HYDROXY: CPT | Performed by: FAMILY MEDICINE

## 2018-12-18 PROCEDURE — 85027 COMPLETE CBC AUTOMATED: CPT | Performed by: FAMILY MEDICINE

## 2018-12-18 PROCEDURE — G0103 PSA SCREENING: HCPCS | Performed by: FAMILY MEDICINE

## 2018-12-18 PROCEDURE — 84443 ASSAY THYROID STIM HORMONE: CPT | Performed by: FAMILY MEDICINE

## 2018-12-18 PROCEDURE — 86803 HEPATITIS C AB TEST: CPT | Performed by: FAMILY MEDICINE

## 2018-12-18 PROCEDURE — 99396 PREV VISIT EST AGE 40-64: CPT | Performed by: FAMILY MEDICINE

## 2018-12-18 PROCEDURE — 90682 RIV4 VACC RECOMBINANT DNA IM: CPT

## 2018-12-18 PROCEDURE — 80053 COMPREHEN METABOLIC PANEL: CPT | Performed by: FAMILY MEDICINE

## 2018-12-18 PROCEDURE — 36415 COLL VENOUS BLD VENIPUNCTURE: CPT | Performed by: FAMILY MEDICINE

## 2018-12-18 PROCEDURE — 80061 LIPID PANEL: CPT | Performed by: FAMILY MEDICINE

## 2018-12-18 PROCEDURE — 90471 IMMUNIZATION ADMIN: CPT

## 2018-12-18 RX ORDER — SILDENAFIL 100 MG/1
100 TABLET, FILM COATED ORAL AS NEEDED
Qty: 6 TABLET | Refills: 5 | Status: SHIPPED | OUTPATIENT
Start: 2018-12-18 | End: 2019-10-17 | Stop reason: SDUPTHER

## 2018-12-18 RX ORDER — OMEPRAZOLE 20 MG/1
20 CAPSULE, DELAYED RELEASE ORAL DAILY
Qty: 90 CAPSULE | Refills: 3 | Status: SHIPPED | OUTPATIENT
Start: 2018-12-18 | End: 2019-10-17 | Stop reason: SDUPTHER

## 2018-12-18 NOTE — PROGRESS NOTES
Assessment/Plan:    Patient received flu blok vaccine today  Fasting labs drawn as below  Form completed for annual preventative physical exam   Patient to continue present treatment  Patient instructed to follow a low-fat, low-salt diet and get regular aerobic exercise 150 min per week  Patient is being referred back to Dr Edwena Seip at  [facility] for follow-up colonoscopy  Recommend follow-up appointment in 6 months although patient prefers 1 year  Diagnoses and all orders for this visit:    Annual physical exam  -     Hepatitis C antibody    Need for influenza vaccination  -     PREFERRED: influenza vaccine, 4270-7126, quadrivalent, recombinant, PF, 0 5 mL, for patients 18 yr+ (FLUBLOK)    Erectile dysfunction, unspecified erectile dysfunction type  -     sildenafil (VIAGRA) 100 mg tablet; Take 1 tablet (100 mg total) by mouth as needed for erectile dysfunction    Essential hypertension  -     CBC and Platelet  -     Comprehensive metabolic panel  -     TSH, 3rd generation with Free T4 reflex  -     UA (URINE) with reflex to Microscopic    Hyperlipidemia, unspecified hyperlipidemia type  -     Comprehensive metabolic panel  -     Lipid panel    Gastroesophageal reflux disease without esophagitis  -     omeprazole (PriLOSEC) 20 mg delayed release capsule; Take 1 capsule (20 mg total) by mouth daily    Vitamin D deficiency  -     Vitamin D 25 hydroxy    Screening PSA (prostate specific antigen)  -     PSA, Total Screen          Subjective:      Patient ID: Linn Camargo is a 60 y o  male  Patient is here for annual physical exam for his health insurance plan and fasting labs  Patient requests yearly flu vaccine  Patient has been feeling well overall and continues to exercise 3 times a week primarily weight training and remains fairly active walking throughout the day  Patient is due for follow-up colonoscopy and plans to schedule with Dr Edwena Seip at  [facility]          The following portions of the patient's history were reviewed and updated as appropriate: allergies, current medications, past family history, past medical history, past social history, past surgical history and problem list     Review of Systems   Constitutional: Negative for activity change, appetite change, fatigue and unexpected weight change  HENT: Negative  Eyes: Negative  Respiratory: Negative for cough, chest tightness, shortness of breath and wheezing  Cardiovascular: Negative for chest pain, palpitations and leg swelling  Gastrointestinal: Negative for abdominal pain, blood in stool, constipation, diarrhea, nausea and vomiting  Genitourinary: Negative for difficulty urinating, dysuria, frequency, hematuria and urgency  Musculoskeletal: Negative for arthralgias, back pain, gait problem, joint swelling, myalgias, neck pain and neck stiffness  Skin: Negative  Neurological: Positive for light-headedness  Negative for dizziness, syncope, weakness and headaches  Hematological: Negative for adenopathy  Does not bruise/bleed easily  Psychiatric/Behavioral: Negative for dysphoric mood and sleep disturbance  The patient is not nervous/anxious  Objective:      /80   Pulse 76   Temp 97 8 °F (36 6 °C) (Tympanic)   Resp 16   Ht 5' 11 5" (1 816 m)   Wt 92 1 kg (203 lb)   BMI 27 92 kg/m²          Physical Exam   Constitutional: He is oriented to person, place, and time  He appears well-developed and well-nourished  HENT:   Head: Normocephalic  Right Ear: External ear normal    Left Ear: External ear normal    Nose: Nose normal    Mouth/Throat: Oropharynx is clear and moist    Eyes: Conjunctivae are normal  No scleral icterus  Neck: Neck supple  No thyromegaly present  Cardiovascular: Normal rate and regular rhythm  Pulmonary/Chest: Effort normal and breath sounds normal    Abdominal: Soft  There is no tenderness  Musculoskeletal: He exhibits no edema     Lymphadenopathy:     He has no cervical adenopathy  Neurological: He is alert and oriented to person, place, and time  Skin: Skin is warm and dry  Psychiatric: He has a normal mood and affect

## 2018-12-19 LAB — HCV AB SER QL: NORMAL

## 2019-02-05 ENCOUNTER — OFFICE VISIT (OUTPATIENT)
Dept: FAMILY MEDICINE CLINIC | Facility: CLINIC | Age: 59
End: 2019-02-05
Payer: COMMERCIAL

## 2019-02-05 VITALS
HEIGHT: 72 IN | TEMPERATURE: 97 F | BODY MASS INDEX: 28.04 KG/M2 | RESPIRATION RATE: 16 BRPM | HEART RATE: 84 BPM | DIASTOLIC BLOOD PRESSURE: 82 MMHG | WEIGHT: 207 LBS | SYSTOLIC BLOOD PRESSURE: 132 MMHG

## 2019-02-05 DIAGNOSIS — J01.00 ACUTE MAXILLARY SINUSITIS, RECURRENCE NOT SPECIFIED: Primary | ICD-10-CM

## 2019-02-05 PROCEDURE — 3008F BODY MASS INDEX DOCD: CPT | Performed by: FAMILY MEDICINE

## 2019-02-05 PROCEDURE — 1036F TOBACCO NON-USER: CPT | Performed by: FAMILY MEDICINE

## 2019-02-05 PROCEDURE — 99213 OFFICE O/P EST LOW 20 MIN: CPT | Performed by: FAMILY MEDICINE

## 2019-02-05 RX ORDER — AZITHROMYCIN 250 MG/1
TABLET, FILM COATED ORAL
Qty: 6 TABLET | Refills: 0 | Status: SHIPPED | OUTPATIENT
Start: 2019-02-05 | End: 2019-02-10

## 2019-02-05 NOTE — PROGRESS NOTES
Assessment/Plan:  Patient was started on a Z-Bill and instructed to take Mucinex DM p r n  Cesar Huang Patient may use Flonase nasal spray 2 sprays per nostril b i d  For 1 week  He is encouraged to drink plenty of fluids and rest   Return to the office in 1 week or sooner anu Huang Diagnoses and all orders for this visit:    Acute maxillary sinusitis, recurrence not specified  Comments:  Z-Bill and Mucinex DM p r n   Increase fluids and rest   Orders:  -     azithromycin (ZITHROMAX) 250 mg tablet; Take 2 tablets today then 1 tablet daily x 4 days          Subjective:      Patient ID: Malik Allen is a 60 y o  male  Patient started 5 days ago with a head cold  He admits to nasal congestion and cough occasionally productive of greenish mucus  He admits to sinus pressure headache and sore throat  He denies fever  He has treated this with DayQuil and NyQuil without significant relief  URI    This is a new problem  The current episode started in the past 7 days  The problem has been unchanged  There has been no fever  Associated symptoms include congestion, coughing, headaches, rhinorrhea, sinus pain, sneezing and a sore throat  Pertinent negatives include no ear pain, plugged ear sensation or wheezing  He has tried NSAIDs and increased fluids (nyquil, dayquil) for the symptoms  The treatment provided mild relief  The following portions of the patient's history were reviewed and updated as appropriate: allergies, current medications, past family history, past medical history, past social history, past surgical history and problem list     Review of Systems   HENT: Positive for congestion, rhinorrhea, sinus pain, sneezing and sore throat  Negative for ear pain  Respiratory: Positive for cough  Negative for wheezing  Neurological: Positive for headaches           Objective:      /82   Pulse 84   Temp (!) 97 °F (36 1 °C) (Oral)   Resp 16   Ht 6' (1 829 m)   Wt 93 9 kg (207 lb)   BMI 28 07 kg/m²          Physical Exam   Constitutional: He is oriented to person, place, and time  He appears well-developed and well-nourished  No distress  HENT:   Head: Normocephalic  Right Ear: External ear normal    Left Ear: External ear normal    Positive turbinates swelling with mucoid drainage  Throat postnasal drainage and erythema  Mucous membranes moist    Eyes: Conjunctivae are normal  No scleral icterus  Neck: Neck supple  Cardiovascular: Normal rate and regular rhythm  Pulmonary/Chest: Effort normal and breath sounds normal  He has no wheezes  Abdominal: Soft  There is no tenderness  Musculoskeletal: He exhibits no edema  Lymphadenopathy:     He has no cervical adenopathy  Neurological: He is alert and oriented to person, place, and time  Skin: Skin is warm and dry  Psychiatric: He has a normal mood and affect

## 2019-03-18 DIAGNOSIS — I10 ESSENTIAL HYPERTENSION: ICD-10-CM

## 2019-03-18 DIAGNOSIS — E78.5 HYPERLIPIDEMIA, UNSPECIFIED HYPERLIPIDEMIA TYPE: ICD-10-CM

## 2019-03-18 RX ORDER — SIMVASTATIN 40 MG
TABLET ORAL
Qty: 90 TABLET | Refills: 1 | Status: SHIPPED | OUTPATIENT
Start: 2019-03-18 | End: 2019-10-17 | Stop reason: SDUPTHER

## 2019-03-18 RX ORDER — LISINOPRIL AND HYDROCHLOROTHIAZIDE 20; 12.5 MG/1; MG/1
TABLET ORAL
Qty: 90 TABLET | Refills: 1 | Status: SHIPPED | OUTPATIENT
Start: 2019-03-18 | End: 2019-10-17 | Stop reason: SDUPTHER

## 2019-10-17 ENCOUNTER — TELEPHONE (OUTPATIENT)
Dept: FAMILY MEDICINE CLINIC | Facility: CLINIC | Age: 59
End: 2019-10-17

## 2019-10-17 DIAGNOSIS — I10 ESSENTIAL HYPERTENSION: ICD-10-CM

## 2019-10-17 DIAGNOSIS — N52.9 ERECTILE DYSFUNCTION, UNSPECIFIED ERECTILE DYSFUNCTION TYPE: ICD-10-CM

## 2019-10-17 DIAGNOSIS — K21.9 GASTROESOPHAGEAL REFLUX DISEASE WITHOUT ESOPHAGITIS: ICD-10-CM

## 2019-10-17 DIAGNOSIS — E78.5 HYPERLIPIDEMIA, UNSPECIFIED HYPERLIPIDEMIA TYPE: ICD-10-CM

## 2019-10-17 RX ORDER — LISINOPRIL AND HYDROCHLOROTHIAZIDE 20; 12.5 MG/1; MG/1
1 TABLET ORAL DAILY
Qty: 90 TABLET | Refills: 1 | Status: SHIPPED | OUTPATIENT
Start: 2019-10-17 | End: 2020-03-30 | Stop reason: SDUPTHER

## 2019-10-17 RX ORDER — SIMVASTATIN 40 MG
40 TABLET ORAL DAILY
Qty: 90 TABLET | Refills: 1 | Status: SHIPPED | OUTPATIENT
Start: 2019-10-17 | End: 2020-03-30 | Stop reason: SDUPTHER

## 2019-10-17 RX ORDER — OMEPRAZOLE 20 MG/1
20 CAPSULE, DELAYED RELEASE ORAL DAILY
Qty: 90 CAPSULE | Refills: 3 | Status: SHIPPED | OUTPATIENT
Start: 2019-10-17 | End: 2020-03-30 | Stop reason: SDUPTHER

## 2019-10-17 RX ORDER — SILDENAFIL 100 MG/1
100 TABLET, FILM COATED ORAL AS NEEDED
Qty: 6 TABLET | Refills: 5 | Status: SHIPPED | OUTPATIENT
Start: 2019-10-17 | End: 2020-03-30 | Stop reason: SDUPTHER

## 2019-10-17 NOTE — TELEPHONE ENCOUNTER
Thomas Norris left message on refill line for Omeprazole, Lisinopril, Sivastatin and Sidenafil, however he did not state a pharmacy, only that he changed pharmacies  LMOM for him to call back with clarification

## 2019-10-21 ENCOUNTER — OFFICE VISIT (OUTPATIENT)
Dept: FAMILY MEDICINE CLINIC | Facility: CLINIC | Age: 59
End: 2019-10-21
Payer: COMMERCIAL

## 2019-10-21 VITALS
WEIGHT: 212 LBS | BODY MASS INDEX: 28.71 KG/M2 | SYSTOLIC BLOOD PRESSURE: 120 MMHG | HEIGHT: 72 IN | HEART RATE: 88 BPM | RESPIRATION RATE: 16 BRPM | TEMPERATURE: 98.5 F | DIASTOLIC BLOOD PRESSURE: 76 MMHG

## 2019-10-21 DIAGNOSIS — J01.00 ACUTE MAXILLARY SINUSITIS, RECURRENCE NOT SPECIFIED: Primary | ICD-10-CM

## 2019-10-21 PROCEDURE — 3008F BODY MASS INDEX DOCD: CPT | Performed by: FAMILY MEDICINE

## 2019-10-21 PROCEDURE — 99213 OFFICE O/P EST LOW 20 MIN: CPT | Performed by: FAMILY MEDICINE

## 2019-10-21 RX ORDER — AZITHROMYCIN 250 MG/1
TABLET, FILM COATED ORAL
Qty: 6 TABLET | Refills: 0 | Status: SHIPPED | OUTPATIENT
Start: 2019-10-21 | End: 2019-10-25

## 2019-10-21 NOTE — PROGRESS NOTES
Assessment/Plan:  Patient will be started on a Z-Bill and recommend he take Robitussin or Mucinex p r n  He is encouraged to drink plenty of fluids and rest   Return to the office in 1 week or sooner p r n  Danae Salasin Diagnoses and all orders for this visit:    Acute maxillary sinusitis, recurrence not specified  -     azithromycin (ZITHROMAX) 250 mg tablet; Take 2 tablets today then 1 tablet daily x 4 days          Subjective:      Patient ID: Anna Cifuentes is a 60 y o  male  Patient started with cold symptoms 8 days ago  He complains of nasal congestion, sore throat and cough  He now complains of sinus pain but denies fever  He has treated this with DayQuil and NyQuil without significant relief  URI    This is a new problem  The current episode started 1 to 4 weeks ago  The problem has been unchanged  There has been no fever  Associated symptoms include congestion, coughing, sinus pain and a sore throat  Pertinent negatives include no ear pain, headaches, plugged ear sensation, rhinorrhea, sneezing or wheezing  He has tried increased fluids (dayquil,nyquil) for the symptoms  The treatment provided mild relief  The following portions of the patient's history were reviewed and updated as appropriate: allergies, current medications, past family history, past medical history, past social history, past surgical history and problem list     Review of Systems   HENT: Positive for congestion, sinus pain and sore throat  Negative for ear pain, rhinorrhea and sneezing  Respiratory: Positive for cough  Negative for wheezing  Neurological: Negative for headaches  Objective:      /76 (BP Location: Left arm, Patient Position: Sitting, Cuff Size: Standard)   Pulse 88   Temp 98 5 °F (36 9 °C) (Tympanic)   Resp 16   Ht 6' (1 829 m)   Wt 96 2 kg (212 lb)   BMI 28 75 kg/m²          Physical Exam   Constitutional: He is oriented to person, place, and time   He appears well-developed and well-nourished  HENT:   Head: Normocephalic  Right Ear: External ear normal    Left Ear: External ear normal    Positive turbinates swelling with mucoid drainage  Throat postnasal drainage and injected  Eyes: Conjunctivae are normal  No scleral icterus  Neck: Neck supple  Cardiovascular: Normal rate and regular rhythm  Pulmonary/Chest: Effort normal and breath sounds normal    Abdominal: Soft  There is no tenderness  Musculoskeletal: He exhibits no edema  Lymphadenopathy:     He has no cervical adenopathy  Neurological: He is alert and oriented to person, place, and time  Skin: Skin is warm and dry  Psychiatric: He has a normal mood and affect  BMI Counseling: Body mass index is 28 75 kg/m²  The BMI is above normal  Nutrition recommendations include reducing fast food intake, consuming healthier snacks, moderation in carbohydrate intake and reducing intake of saturated fat and trans fat  Exercise recommendations include moderate aerobic physical activity for 150 minutes/week

## 2019-10-25 ENCOUNTER — TELEPHONE (OUTPATIENT)
Dept: FAMILY MEDICINE CLINIC | Facility: CLINIC | Age: 59
End: 2019-10-25

## 2019-10-25 DIAGNOSIS — J01.00 ACUTE MAXILLARY SINUSITIS, RECURRENCE NOT SPECIFIED: Primary | ICD-10-CM

## 2019-10-25 RX ORDER — BENZONATATE 200 MG/1
200 CAPSULE ORAL 3 TIMES DAILY PRN
Qty: 20 CAPSULE | Refills: 0 | Status: SHIPPED | OUTPATIENT
Start: 2019-10-25 | End: 2019-12-19 | Stop reason: ALTCHOICE

## 2019-10-25 NOTE — TELEPHONE ENCOUNTER
Cassie Gutierrez was in to see you this week and states his cough has worsened and would like a cough medicine sent to [facility] in [place]  Please advise as patient would like a call back if authorized    Thank you

## 2019-10-25 NOTE — TELEPHONE ENCOUNTER
Prescription sent to [facility] pharmacy for [facility]  Please notify patient

## 2019-11-08 ENCOUNTER — OFFICE VISIT (OUTPATIENT)
Dept: FAMILY MEDICINE CLINIC | Facility: CLINIC | Age: 59
End: 2019-11-08
Payer: COMMERCIAL

## 2019-11-08 VITALS
TEMPERATURE: 97.7 F | HEIGHT: 72 IN | OXYGEN SATURATION: 97 % | WEIGHT: 216 LBS | RESPIRATION RATE: 16 BRPM | BODY MASS INDEX: 29.26 KG/M2 | SYSTOLIC BLOOD PRESSURE: 124 MMHG | DIASTOLIC BLOOD PRESSURE: 84 MMHG | HEART RATE: 80 BPM

## 2019-11-08 DIAGNOSIS — J01.00 ACUTE MAXILLARY SINUSITIS, RECURRENCE NOT SPECIFIED: Primary | ICD-10-CM

## 2019-11-08 PROCEDURE — 1036F TOBACCO NON-USER: CPT | Performed by: FAMILY MEDICINE

## 2019-11-08 PROCEDURE — 99213 OFFICE O/P EST LOW 20 MIN: CPT | Performed by: FAMILY MEDICINE

## 2019-11-08 RX ORDER — CEFUROXIME AXETIL 250 MG/1
250 TABLET ORAL EVERY 12 HOURS SCHEDULED
Qty: 20 TABLET | Refills: 0 | Status: SHIPPED | OUTPATIENT
Start: 2019-11-08 | End: 2019-11-18

## 2019-11-08 NOTE — PROGRESS NOTES
Assessment/Plan:  Patient was started on Ceftin 250 mg b i d  For 10 days and may take Robitussin or Mucinex p r n  He is encouraged to drink plenty of fluids and rest   Return to the office in 1-2 weeks or sooner p r n  Almita Nelson Diagnoses and all orders for this visit:    Acute maxillary sinusitis, recurrence not specified  -     cefuroxime (CEFTIN) 250 mg tablet; Take 1 tablet (250 mg total) by mouth every 12 (twelve) hours for 10 days          Subjective:      Patient ID: Shanti Jordan is a 60 y o  male  Patient complains of continued cold and sinus symptoms  He completed a Z-Bill and Tessalon Perles without significant relief  He complains of nasal congestion, sinus pressure headache and cough productive of clear mucus  Patient denies fever  URI    This is a new problem  The current episode started 1 to 4 weeks ago  The problem has been unchanged  There has been no fever  Associated symptoms include congestion, coughing, headaches, sinus pain and sneezing  Pertinent negatives include no diarrhea, ear pain, nausea, plugged ear sensation, rhinorrhea, sore throat, vomiting or wheezing  He has tried antihistamine and increased fluids (zpak, tessalon, robitussin DM) for the symptoms  The treatment provided mild relief  The following portions of the patient's history were reviewed and updated as appropriate: allergies, current medications, past family history, past medical history, past social history, past surgical history and problem list     Review of Systems   HENT: Positive for congestion, sinus pain and sneezing  Negative for ear pain, rhinorrhea and sore throat  Respiratory: Positive for cough  Negative for wheezing  Gastrointestinal: Negative for diarrhea, nausea and vomiting  Neurological: Positive for headaches           Objective:      /84   Pulse 80   Temp 97 7 °F (36 5 °C) (Tympanic)   Resp 16   Ht 6' (1 829 m)   Wt 98 kg (216 lb)   SpO2 97%   BMI 29 29 kg/m² Physical Exam   Constitutional: He is oriented to person, place, and time  He appears well-developed and well-nourished  No distress  HENT:   Head: Normocephalic  Right Ear: External ear normal    Left Ear: External ear normal    Turbinates swelling with mucoid drainage  Throat postnasal drainage and injected  Eyes: Conjunctivae are normal  No scleral icterus  Neck: Neck supple  Cardiovascular: Normal rate and regular rhythm  Pulmonary/Chest: Effort normal and breath sounds normal    Abdominal: Soft  There is no tenderness  Musculoskeletal: He exhibits no edema  Lymphadenopathy:     He has no cervical adenopathy  Neurological: He is alert and oriented to person, place, and time  Skin: Skin is warm and dry  Psychiatric: He has a normal mood and affect

## 2019-12-19 ENCOUNTER — OFFICE VISIT (OUTPATIENT)
Dept: FAMILY MEDICINE CLINIC | Facility: CLINIC | Age: 59
End: 2019-12-19
Payer: COMMERCIAL

## 2019-12-19 VITALS
DIASTOLIC BLOOD PRESSURE: 84 MMHG | HEART RATE: 80 BPM | RESPIRATION RATE: 16 BRPM | OXYGEN SATURATION: 97 % | HEIGHT: 72 IN | BODY MASS INDEX: 27.9 KG/M2 | TEMPERATURE: 97.3 F | WEIGHT: 206 LBS | SYSTOLIC BLOOD PRESSURE: 122 MMHG

## 2019-12-19 DIAGNOSIS — I10 ESSENTIAL HYPERTENSION: ICD-10-CM

## 2019-12-19 DIAGNOSIS — Z12.5 SCREENING PSA (PROSTATE SPECIFIC ANTIGEN): ICD-10-CM

## 2019-12-19 DIAGNOSIS — Z23 FLU VACCINE NEED: ICD-10-CM

## 2019-12-19 DIAGNOSIS — E78.5 HYPERLIPIDEMIA, UNSPECIFIED HYPERLIPIDEMIA TYPE: ICD-10-CM

## 2019-12-19 DIAGNOSIS — E55.9 VITAMIN D DEFICIENCY: ICD-10-CM

## 2019-12-19 DIAGNOSIS — Z00.00 ANNUAL PHYSICAL EXAM: Primary | ICD-10-CM

## 2019-12-19 DIAGNOSIS — Z12.11 ENCOUNTER FOR SCREENING COLONOSCOPY: ICD-10-CM

## 2019-12-19 LAB
25(OH)D3 SERPL-MCNC: 66.2 NG/ML (ref 30–100)
ALBUMIN SERPL BCP-MCNC: 4.3 G/DL (ref 3.5–5)
ALP SERPL-CCNC: 69 U/L (ref 46–116)
ALT SERPL W P-5'-P-CCNC: 57 U/L (ref 12–78)
ANION GAP SERPL CALCULATED.3IONS-SCNC: 5 MMOL/L (ref 4–13)
AST SERPL W P-5'-P-CCNC: 26 U/L (ref 5–45)
BILIRUB SERPL-MCNC: 0.77 MG/DL (ref 0.2–1)
BILIRUB UR QL STRIP: NEGATIVE
BUN SERPL-MCNC: 16 MG/DL (ref 5–25)
CALCIUM SERPL-MCNC: 9.9 MG/DL (ref 8.3–10.1)
CHLORIDE SERPL-SCNC: 103 MMOL/L (ref 100–108)
CHOLEST SERPL-MCNC: 169 MG/DL (ref 50–200)
CLARITY UR: CLEAR
CO2 SERPL-SCNC: 28 MMOL/L (ref 21–32)
COLOR UR: YELLOW
CREAT SERPL-MCNC: 1.09 MG/DL (ref 0.6–1.3)
ERYTHROCYTE [DISTWIDTH] IN BLOOD BY AUTOMATED COUNT: 12.6 % (ref 11.6–15.1)
GFR SERPL CREATININE-BSD FRML MDRD: 73 ML/MIN/1.73SQ M
GLUCOSE P FAST SERPL-MCNC: 96 MG/DL (ref 65–99)
GLUCOSE UR STRIP-MCNC: NEGATIVE MG/DL
HCT VFR BLD AUTO: 44.8 % (ref 36.5–49.3)
HDLC SERPL-MCNC: 56 MG/DL
HGB BLD-MCNC: 14.8 G/DL (ref 12–17)
HGB UR QL STRIP.AUTO: NEGATIVE
KETONES UR STRIP-MCNC: NEGATIVE MG/DL
LDLC SERPL CALC-MCNC: 89 MG/DL (ref 0–100)
LEUKOCYTE ESTERASE UR QL STRIP: NEGATIVE
MCH RBC QN AUTO: 29.5 PG (ref 26.8–34.3)
MCHC RBC AUTO-ENTMCNC: 33 G/DL (ref 31.4–37.4)
MCV RBC AUTO: 89 FL (ref 82–98)
NITRITE UR QL STRIP: NEGATIVE
NONHDLC SERPL-MCNC: 113 MG/DL
PH UR STRIP.AUTO: 5.5 [PH]
PLATELET # BLD AUTO: 439 THOUSANDS/UL (ref 149–390)
PMV BLD AUTO: 10 FL (ref 8.9–12.7)
POTASSIUM SERPL-SCNC: 4.7 MMOL/L (ref 3.5–5.3)
PROT SERPL-MCNC: 8.8 G/DL (ref 6.4–8.2)
PROT UR STRIP-MCNC: NEGATIVE MG/DL
PSA SERPL-MCNC: 0.8 NG/ML (ref 0–4)
RBC # BLD AUTO: 5.01 MILLION/UL (ref 3.88–5.62)
SODIUM SERPL-SCNC: 136 MMOL/L (ref 136–145)
SP GR UR STRIP.AUTO: 1.01 (ref 1–1.03)
TRIGL SERPL-MCNC: 121 MG/DL
TSH SERPL DL<=0.05 MIU/L-ACNC: 2.92 UIU/ML (ref 0.36–3.74)
UROBILINOGEN UR QL STRIP.AUTO: 0.2 E.U./DL
WBC # BLD AUTO: 5.8 THOUSAND/UL (ref 4.31–10.16)

## 2019-12-19 PROCEDURE — 90471 IMMUNIZATION ADMIN: CPT

## 2019-12-19 PROCEDURE — 81003 URINALYSIS AUTO W/O SCOPE: CPT | Performed by: FAMILY MEDICINE

## 2019-12-19 PROCEDURE — G0103 PSA SCREENING: HCPCS | Performed by: FAMILY MEDICINE

## 2019-12-19 PROCEDURE — 84443 ASSAY THYROID STIM HORMONE: CPT | Performed by: FAMILY MEDICINE

## 2019-12-19 PROCEDURE — 80061 LIPID PANEL: CPT | Performed by: FAMILY MEDICINE

## 2019-12-19 PROCEDURE — 82306 VITAMIN D 25 HYDROXY: CPT | Performed by: FAMILY MEDICINE

## 2019-12-19 PROCEDURE — 99396 PREV VISIT EST AGE 40-64: CPT | Performed by: FAMILY MEDICINE

## 2019-12-19 PROCEDURE — 36415 COLL VENOUS BLD VENIPUNCTURE: CPT | Performed by: FAMILY MEDICINE

## 2019-12-19 PROCEDURE — 85027 COMPLETE CBC AUTOMATED: CPT | Performed by: FAMILY MEDICINE

## 2019-12-19 PROCEDURE — 80053 COMPREHEN METABOLIC PANEL: CPT | Performed by: FAMILY MEDICINE

## 2019-12-19 PROCEDURE — 90682 RIV4 VACC RECOMBINANT DNA IM: CPT

## 2019-12-19 NOTE — PROGRESS NOTES
Assessment/Plan:  Patient received flu block vaccine today  Fasting labs drawn as below  Patient to continue present treatment  He is instructed to follow a low-fat, low-salt and a low-carbohydrate diet and get regular aerobic exercise 150 minutes per week  Patient given information sheet on neck stretching and range-of-motion exercises  Patient to schedule follow-up colonoscopy  Patient to return the office in 6 months  Diagnoses and all orders for this visit:    Annual physical exam    Essential hypertension  -     CBC and Platelet  -     Comprehensive metabolic panel  -     TSH, 3rd generation with Free T4 reflex  -     UA w Reflex to Microscopic w Reflex to Culture - Clinic Collect    Hyperlipidemia, unspecified hyperlipidemia type  -     Comprehensive metabolic panel  -     Lipid panel    Vitamin D deficiency  -     Vitamin D 25 hydroxy    Flu vaccine need  -     influenza vaccine, 0232-3600, quadrivalent, recombinant, PF, 0 5 mL, for patients 18 yr+ (FLUBLOK)    Screening PSA (prostate specific antigen)  -     PSA, Total Screen    Encounter for screening colonoscopy  -     Ambulatory referral to Gastroenterology; Future          Subjective:      Patient ID: Samson Gutierrez is a 60 y o  male  Patient is here for annual physical exam and follow-up of chronic conditions  He requests flu vaccine and fasting labs  Patient has been feeling well overall and continues to exercise 3 times a week  He admits to right-sided neck pain and stiffness  Patient had colonoscopy on 01/12/2012 with Dr Martha Plummer at [phone] and was told to recheck in 7 years  Hypertension   This is a chronic problem  The problem is controlled  Pertinent negatives include no anxiety, blurred vision, chest pain, headaches, neck pain, orthopnea, palpitations, peripheral edema, PND or shortness of breath  Risk factors for coronary artery disease include dyslipidemia, family history and male gender   Past treatments include ACE inhibitors and diuretics  The current treatment provides significant improvement  There are no compliance problems  There is no history of CAD/MI or CVA  Hyperlipidemia   This is a chronic problem  The problem is controlled  He has no history of diabetes or hypothyroidism  Pertinent negatives include no chest pain, focal sensory loss, focal weakness, leg pain, myalgias or shortness of breath  Current antihyperlipidemic treatment includes statins  The current treatment provides significant improvement of lipids  There are no compliance problems  The following portions of the patient's history were reviewed and updated as appropriate: allergies, current medications, past family history, past medical history, past social history, past surgical history and problem list     Review of Systems   Constitutional: Negative for activity change, appetite change, fatigue and unexpected weight change  Eyes: Negative for blurred vision  Respiratory: Negative for cough, chest tightness, shortness of breath and wheezing  Cardiovascular: Negative for chest pain, palpitations, orthopnea, leg swelling and PND  Gastrointestinal: Negative for abdominal pain, blood in stool, constipation, diarrhea, nausea and vomiting  Genitourinary: Negative for difficulty urinating, dysuria, frequency, hematuria and urgency  Nocturia x 2     Musculoskeletal: Positive for neck stiffness  Negative for arthralgias, back pain, gait problem, joint swelling, myalgias and neck pain  Neurological: Negative for focal weakness and headaches  Objective:      /84   Pulse 80   Temp (!) 97 3 °F (36 3 °C) (Tympanic)   Resp 16   Ht 5' 11 65" (1 82 m)   Wt 93 4 kg (206 lb)   SpO2 97%   BMI 28 21 kg/m²          Physical Exam   Constitutional: He is oriented to person, place, and time  He appears well-developed and well-nourished  HENT:   Head: Normocephalic     Right Ear: External ear normal    Left Ear: External ear normal    Nose: Nose normal    Mouth/Throat: Oropharynx is clear and moist    Eyes: Conjunctivae are normal  No scleral icterus  Neck: Normal range of motion  Neck supple  No thyromegaly present  Mild right cervical paravertebral and trapezius muscle tenderness  Cardiovascular: Normal rate and regular rhythm  Pulmonary/Chest: Effort normal and breath sounds normal    Abdominal: Soft  There is no tenderness  Musculoskeletal: He exhibits no edema  Lymphadenopathy:     He has no cervical adenopathy  Neurological: He is alert and oriented to person, place, and time  Skin: Skin is warm and dry  Psychiatric: He has a normal mood and affect

## 2020-03-02 ENCOUNTER — OFFICE VISIT (OUTPATIENT)
Dept: FAMILY MEDICINE CLINIC | Facility: CLINIC | Age: 60
End: 2020-03-02
Payer: COMMERCIAL

## 2020-03-02 VITALS
WEIGHT: 213 LBS | BODY MASS INDEX: 28.85 KG/M2 | HEART RATE: 84 BPM | RESPIRATION RATE: 16 BRPM | TEMPERATURE: 97.9 F | HEIGHT: 72 IN | OXYGEN SATURATION: 97 % | SYSTOLIC BLOOD PRESSURE: 124 MMHG | DIASTOLIC BLOOD PRESSURE: 82 MMHG

## 2020-03-02 DIAGNOSIS — L25.9 CONTACT DERMATITIS, UNSPECIFIED CONTACT DERMATITIS TYPE, UNSPECIFIED TRIGGER: Primary | ICD-10-CM

## 2020-03-02 PROCEDURE — 3079F DIAST BP 80-89 MM HG: CPT | Performed by: FAMILY MEDICINE

## 2020-03-02 PROCEDURE — 3074F SYST BP LT 130 MM HG: CPT | Performed by: FAMILY MEDICINE

## 2020-03-02 PROCEDURE — 1036F TOBACCO NON-USER: CPT | Performed by: FAMILY MEDICINE

## 2020-03-02 PROCEDURE — 99213 OFFICE O/P EST LOW 20 MIN: CPT | Performed by: FAMILY MEDICINE

## 2020-03-02 PROCEDURE — 3008F BODY MASS INDEX DOCD: CPT | Performed by: FAMILY MEDICINE

## 2020-03-02 RX ORDER — METHYLPREDNISOLONE 4 MG/1
TABLET ORAL
Qty: 21 EACH | Refills: 0 | Status: SHIPPED | OUTPATIENT
Start: 2020-03-02 | End: 2020-08-17 | Stop reason: ALTCHOICE

## 2020-03-02 NOTE — PROGRESS NOTES
Assessment/Plan:  Discussed treatment options with patient  Patient was started on a Medrol Dosepak and may take Zyrtec 10 mg in the morning and Benadryl q h s  P r n  Recommend increase fluids and rest   Return the office in 1 week or call sooner p r n  Karli Vides Diagnoses and all orders for this visit:    Contact dermatitis, unspecified contact dermatitis type, unspecified trigger  -     methylPREDNISolone 4 MG tablet therapy pack; Use as directed on package          Subjective:      Patient ID: Narciso Benson is a 63 y o  male  Patient complains of itchy irritated rash on his face for the past 2 weeks  He denies any new soaps or known exposures  He uses dove soap  He has treated this with Benadryl and Benadryl cream without significant relief  Rash   This is a new problem  The current episode started 1 to 4 weeks ago  The problem has been gradually worsening since onset  The affected locations include the face  The rash is characterized by redness and itchiness  He was exposed to nothing  Pertinent negatives include no anorexia, congestion, cough, fatigue, fever or sore throat  Past treatments include antihistamine, anti-itch cream and moisturizer  The treatment provided mild relief  The following portions of the patient's history were reviewed and updated as appropriate: allergies, current medications, past family history, past medical history, past social history, past surgical history and problem list     Review of Systems   Constitutional: Negative for fatigue and fever  HENT: Negative for congestion and sore throat  Respiratory: Negative for cough  Gastrointestinal: Negative for anorexia  Skin: Positive for rash  Objective:      /82   Pulse 84   Temp 97 9 °F (36 6 °C) (Tympanic)   Resp 16   Ht 5' 11 65" (1 82 m)   Wt 96 6 kg (213 lb)   SpO2 97%   BMI 29 17 kg/m²          Physical Exam   Constitutional: He is oriented to person, place, and time   He appears well-developed and well-nourished  HENT:   Head: Normocephalic  Right Ear: External ear normal    Left Ear: External ear normal    Nose: Nose normal    Mouth/Throat: Oropharynx is clear and moist    Eyes: Conjunctivae are normal  No scleral icterus  Neck: Neck supple  Cardiovascular: Normal rate and regular rhythm  Pulmonary/Chest: Effort normal and breath sounds normal    Abdominal: Soft  There is no tenderness  Musculoskeletal: He exhibits no edema  Lymphadenopathy:     He has no cervical adenopathy  Neurological: He is alert and oriented to person, place, and time  Skin: Skin is warm and dry  Rash noted  There is erythema  Erythematous papular rash on face including forehead and cheeks bilaterally  Psychiatric: He has a normal mood and affect  BMI Counseling: Body mass index is 29 17 kg/m²  The BMI is above normal  Nutrition recommendations include decreasing overall calorie intake, 3-5 servings of fruits/vegetables daily, reducing fast food intake, consuming healthier snacks, moderation in carbohydrate intake and reducing intake of saturated fat and trans fat  Exercise recommendations include moderate aerobic physical activity for 150 minutes/week

## 2020-03-16 ENCOUNTER — OFFICE VISIT (OUTPATIENT)
Dept: FAMILY MEDICINE CLINIC | Facility: CLINIC | Age: 60
End: 2020-03-16
Payer: COMMERCIAL

## 2020-03-16 VITALS
DIASTOLIC BLOOD PRESSURE: 70 MMHG | TEMPERATURE: 98.3 F | RESPIRATION RATE: 16 BRPM | HEART RATE: 83 BPM | BODY MASS INDEX: 28.71 KG/M2 | SYSTOLIC BLOOD PRESSURE: 104 MMHG | WEIGHT: 212 LBS | OXYGEN SATURATION: 98 % | HEIGHT: 72 IN

## 2020-03-16 DIAGNOSIS — L23.9 ALLERGIC CONTACT DERMATITIS, UNSPECIFIED TRIGGER: Primary | ICD-10-CM

## 2020-03-16 PROCEDURE — 3008F BODY MASS INDEX DOCD: CPT | Performed by: FAMILY MEDICINE

## 2020-03-16 PROCEDURE — 1036F TOBACCO NON-USER: CPT | Performed by: FAMILY MEDICINE

## 2020-03-16 PROCEDURE — 3074F SYST BP LT 130 MM HG: CPT | Performed by: FAMILY MEDICINE

## 2020-03-16 PROCEDURE — 99213 OFFICE O/P EST LOW 20 MIN: CPT | Performed by: FAMILY MEDICINE

## 2020-03-16 PROCEDURE — 3078F DIAST BP <80 MM HG: CPT | Performed by: FAMILY MEDICINE

## 2020-03-16 RX ORDER — PREDNISONE 10 MG/1
TABLET ORAL
Qty: 33 TABLET | Refills: 0 | Status: SHIPPED | OUTPATIENT
Start: 2020-03-16 | End: 2020-03-25

## 2020-03-16 NOTE — PROGRESS NOTES
Assessment/Plan:  Patient was started on prednisone taper as below  Recommend patient take Zyrtec 10 mg q a m  And Benadryl q h s   Recommend increased fluids and rest   Return the office in 1 week or call sooner p r n   If symptoms persist discussed referral to Dermatology  Diagnoses and all orders for this visit:    Allergic contact dermatitis, unspecified trigger  -     predniSONE 10 mg tablet; 6 tabs QD X 3 days then decrease by 1 tab daily          Subjective:      Patient ID: Indio Ritchie is a 63 y o  male  Patient complains of recurrent rash on his face over the past few days  Patient had treated with Medrol Dosepak 2 weeks ago with significant improvement but never completely resolved  Treated this with Benadryl last night with some relief  Rash   This is a recurrent problem  The current episode started 1 to 4 weeks ago  The affected locations include the face  The rash is characterized by redness and dryness  He was exposed to nothing  Pertinent negatives include no fatigue, fever or sore throat  Past treatments include oral steroids and antihistamine  The treatment provided moderate relief  The following portions of the patient's history were reviewed and updated as appropriate: allergies, current medications, past family history, past medical history, past social history, past surgical history and problem list     Review of Systems   Constitutional: Negative for fatigue and fever  HENT: Negative for sore throat  Skin: Positive for rash  Objective:      /70 (BP Location: Left arm, Patient Position: Sitting, Cuff Size: Standard)   Pulse 83   Temp 98 3 °F (36 8 °C) (Tympanic)   Resp 16   Ht 5' 11 5" (1 816 m)   Wt 96 2 kg (212 lb)   SpO2 98%   BMI 29 16 kg/m²          Physical Exam   Constitutional: He is oriented to person, place, and time  He appears well-developed and well-nourished  HENT:   Head: Normocephalic     Right Ear: External ear normal    Left Ear: External ear normal    Nose: Nose normal    Mouth/Throat: Oropharynx is clear and moist    Eyes: Conjunctivae are normal  No scleral icterus  Neck: Neck supple  Cardiovascular: Normal rate and regular rhythm  Pulmonary/Chest: Effort normal and breath sounds normal    Abdominal: Soft  There is no tenderness  Musculoskeletal: He exhibits no edema  Lymphadenopathy:     He has no cervical adenopathy  Neurological: He is alert and oriented to person, place, and time  Skin: Skin is warm and dry  Rash noted  Erythematous slightly dry scaly rash on face including forehead and cheeks  Psychiatric: He has a normal mood and affect

## 2020-03-17 ENCOUNTER — TELEPHONE (OUTPATIENT)
Dept: FAMILY MEDICINE CLINIC | Facility: CLINIC | Age: 60
End: 2020-03-17

## 2020-03-17 NOTE — TELEPHONE ENCOUNTER
Spoke with patient and confirmed that his copay is $40 this year  He states he does know that, but there is an additional amount being charged, as if Dr Mehdi Greenberg is a specialist  Message sent to [phone] [facility] [employer] to advise that Dm Donnelly 150 underpaid and to please reprocess the claim or look into why they are charging and additional amount

## 2020-03-17 NOTE — TELEPHONE ENCOUNTER
Patient is calling in with billing questions, he states that every time he comes in, he gets billed as if its a specialist, he received 40 bill please advise on coding call back number [phone]

## 2020-03-24 ENCOUNTER — TELEMEDICINE (OUTPATIENT)
Dept: FAMILY MEDICINE CLINIC | Facility: CLINIC | Age: 60
End: 2020-03-24
Payer: COMMERCIAL

## 2020-03-24 DIAGNOSIS — L71.9 ROSACEA: Primary | ICD-10-CM

## 2020-03-24 PROCEDURE — 99213 OFFICE O/P EST LOW 20 MIN: CPT | Performed by: FAMILY MEDICINE

## 2020-03-24 RX ORDER — METRONIDAZOLE 10 MG/G
GEL TOPICAL DAILY
Qty: 45 G | Refills: 0 | Status: SHIPPED | OUTPATIENT
Start: 2020-03-24 | End: 2021-07-20

## 2020-03-24 NOTE — PROGRESS NOTES
Virtual Regular Visit    Problem List Items Addressed This Visit     None               Reason for visit is follow-up of rash on face  Encounter provider Franco Diallo DO    Provider located at [address] [facility] [phone]      Recent Visits  Date Type Provider Dept   03/17/20 Telephone Tj Dave MA [facility] [address] recent visits within past 7 days and meeting all other requirements     Future Appointments  No visits were found meeting these conditions  Showing future appointments within next 150 days and meeting all other requirements        After connecting through [employer], the patient was identified by name and date of birth  Edilma Huerta was informed that this is a telemedicine visit and that the visit is being conducted through telephone which may not be secure and therefore, might not be HIPAA-compliant  My office door was closed  No one else was in the room  He acknowledged consent and understanding of privacy and security of the video platform  The patient has agreed to participate and understands they can discontinue the visit at any time  Subjective  Edilma Huerta is a 63 y o  male patient completed course of prednisone and rash had completely resolved although now has reoccurred on his forehead and cheeks  Patient did not attempt to get an appoint with Dermatology  Discussed treatment options with patient  Will treat with Metrogel for probable rosacea  Follow-up in 1-2 weeks  If symptoms persist recommend referral to Dermatology        Past Medical History:   Diagnosis Date    Allergic rhinitis     Cholelithiasis     Decreased testosterone level in male     ED (erectile dysfunction)     Fatty liver     Gastritis     GERD (gastroesophageal reflux disease)     Hyperlipidemia     Hypertension     Hypogonadotropic hypogonadism ([address] )     Vitamin D deficiency        Past Surgical History:   Procedure Laterality Date    TONSILLECTOMY         Current Outpatient Medications   Medication Sig Dispense Refill    b complex vitamins capsule Take by mouth      Cholecalciferol (VITAMIN D3) 5000 units CAPS Take 1 capsule by mouth daily        fluticasone (FLONASE) 50 mcg/act nasal spray 2 sprays into each nostril daily      lisinopril-hydrochlorothiazide (PRINZIDE,ZESTORETIC) 20-12 5 MG per tablet Take 1 tablet by mouth daily 90 tablet 1    methylPREDNISolone 4 MG tablet therapy pack Use as directed on package 21 each 0    Omega-3 Fatty Acids (FISH OIL) 1200 MG CAPS by Does not apply route      omeprazole (PriLOSEC) 20 mg delayed release capsule Take 1 capsule (20 mg total) by mouth daily 90 capsule 3    predniSONE 10 mg tablet 6 tabs QD X 3 days then decrease by 1 tab daily 33 tablet 0    Psyllium (METAMUCIL FIBER SINGLES PO) Take by mouth      sildenafil (VIAGRA) 100 mg tablet Take 1 tablet (100 mg total) by mouth as needed for erectile dysfunction 6 tablet 5    simvastatin (ZOCOR) 40 mg tablet Take 1 tablet (40 mg total) by mouth daily 90 tablet 1     No current facility-administered medications for this visit  Allergies   Allergen Reactions    Moxifloxacin      Causes low BP       Review of Systems      I spent 10 minutes with the patient during this visit

## 2020-03-25 ENCOUNTER — TELEMEDICINE (OUTPATIENT)
Dept: FAMILY MEDICINE CLINIC | Facility: CLINIC | Age: 60
End: 2020-03-25
Payer: COMMERCIAL

## 2020-03-25 DIAGNOSIS — L23.9 ALLERGIC CONTACT DERMATITIS, UNSPECIFIED TRIGGER: ICD-10-CM

## 2020-03-25 DIAGNOSIS — L71.9 ROSACEA: Primary | ICD-10-CM

## 2020-03-25 PROCEDURE — 99213 OFFICE O/P EST LOW 20 MIN: CPT | Performed by: FAMILY MEDICINE

## 2020-03-25 RX ORDER — PREDNISONE 10 MG/1
TABLET ORAL
Qty: 39 TABLET | Refills: 0 | Status: SHIPPED | OUTPATIENT
Start: 2020-03-25 | End: 2021-07-20

## 2020-03-25 NOTE — PROGRESS NOTES
Virtual Regular Visit    Problem List Items Addressed This Visit     None               Reason for visit is rash  Encounter provider Ashley Merida DO    Provider located at [address] [facility] [phone]      Recent Visits  Date Type Provider Dept   03/24/20 [address] , DO [facility]   Showing recent visits within past 7 days and meeting all other requirements     Future Appointments  No visits were found meeting these conditions  Showing future appointments within next 150 days and meeting all other requirements        After connecting through [employer], the patient was identified by name and date of birth  Leonel García was informed that this is a telemedicine visit and that the visit is being conducted through [employer] and patient was informed that this is not a secure, HIPAA-complaint platform  he agrees to proceed  which may not be secure and therefore, might not be HIPAA-compliant  My office door was closed  No one else was in the room  He acknowledged consent and understanding of privacy and security of the video platform  The patient has agreed to participate and understands they can discontinue the visit at any time  Subjective  Leonel García is a 63 y o  male complains of recurrent and worsening rash on his face including his cheeks and forehead  Rashes mildly itchy and irritated although not painful  Rash initially started few weeks ago after patient slept in a hotel room  His wife ended up with a similar rash which went away in 2 days on its own  Patient initially took Medrol Dosepak and rash had resolved although reoccurred then was treated with prednisone taper for 9 days and rash again completely resolved although 2 days later returned  He was started on Metrogel yesterday without improvement  He continues to take Zyrtec daily and Benadryl q h s  Without significant improvement    Patient denies fever, chills or sweats  He denies myalgias or arthralgias and otherwise feels well overall  Patient attempted to schedule appoint with dermatologist although was unsuccessful  Discussed treatment options with patient and will continue Metrogel daily and repeat prednisone taper starting at 60 mg daily for 3 days then 40 mg daily for 3 days then 20 mg daily for 3 days then 10 mg daily for 3 days  Follow-up in 1-2 weeks or call sooner anu Chawla       Past Medical History:   Diagnosis Date    Allergic rhinitis     Cholelithiasis     Decreased testosterone level in male     ED (erectile dysfunction)     Fatty liver     Gastritis     GERD (gastroesophageal reflux disease)     Hyperlipidemia     Hypertension     [facility]adism ([facility] [address] [facility]     Vitamin D deficiency        Past Surgical History:   Procedure Laterality Date    TONSILLECTOMY         Current Outpatient Medications   Medication Sig Dispense Refill    b complex vitamins capsule Take by mouth      Cholecalciferol (VITAMIN D3) 5000 units CAPS Take 1 capsule by mouth daily        fluticasone (FLONASE) 50 mcg/act nasal spray 2 sprays into each nostril daily      lisinopril-hydrochlorothiazide (PRINZIDE,ZESTORETIC) 20-12 5 MG per tablet Take 1 tablet by mouth daily 90 tablet 1    methylPREDNISolone 4 MG tablet therapy pack Use as directed on package 21 each 0    metroNIDAZOLE (METROGEL) 1 % gel Apply topically daily 45 g 0    Omega-3 Fatty Acids (FISH OIL) 1200 MG CAPS by Does not apply route      omeprazole (PriLOSEC) 20 mg delayed release capsule Take 1 capsule (20 mg total) by mouth daily 90 capsule 3    predniSONE 10 mg tablet 6 tabs QD X 3 days then decrease by 1 tab daily 33 tablet 0    Psyllium (METAMUCIL FIBER SINGLES PO) Take by mouth      sildenafil (VIAGRA) 100 mg tablet Take 1 tablet (100 mg total) by mouth as needed for erectile dysfunction 6 tablet 5    simvastatin (ZOCOR) 40 mg tablet Take 1 tablet (40 mg total) by mouth daily 90 tablet 1     No current facility-administered medications for this visit  Allergies   Allergen Reactions    Moxifloxacin      Causes low BP       Review of Systems    Physical Exam erythematous papular rash on cheeks and forehead  I spent 15 minutes with the patient during this visit

## 2020-03-30 DIAGNOSIS — N52.9 ERECTILE DYSFUNCTION, UNSPECIFIED ERECTILE DYSFUNCTION TYPE: ICD-10-CM

## 2020-03-30 DIAGNOSIS — K21.9 GASTROESOPHAGEAL REFLUX DISEASE WITHOUT ESOPHAGITIS: ICD-10-CM

## 2020-03-30 DIAGNOSIS — I10 ESSENTIAL HYPERTENSION: ICD-10-CM

## 2020-03-30 DIAGNOSIS — E78.5 HYPERLIPIDEMIA, UNSPECIFIED HYPERLIPIDEMIA TYPE: ICD-10-CM

## 2020-03-30 RX ORDER — OMEPRAZOLE 20 MG/1
20 CAPSULE, DELAYED RELEASE ORAL DAILY
Qty: 90 CAPSULE | Refills: 3 | Status: SHIPPED | OUTPATIENT
Start: 2020-03-30 | End: 2020-06-18 | Stop reason: SDUPTHER

## 2020-03-30 RX ORDER — SIMVASTATIN 40 MG
40 TABLET ORAL DAILY
Qty: 90 TABLET | Refills: 1 | Status: SHIPPED | OUTPATIENT
Start: 2020-03-30 | End: 2020-06-18 | Stop reason: SDUPTHER

## 2020-03-30 RX ORDER — LISINOPRIL AND HYDROCHLOROTHIAZIDE 20; 12.5 MG/1; MG/1
1 TABLET ORAL DAILY
Qty: 90 TABLET | Refills: 1 | Status: SHIPPED | OUTPATIENT
Start: 2020-03-30 | End: 2020-06-18 | Stop reason: SDUPTHER

## 2020-03-30 RX ORDER — LISINOPRIL AND HYDROCHLOROTHIAZIDE 20; 12.5 MG/1; MG/1
1 TABLET ORAL DAILY
Qty: 90 TABLET | Refills: 1 | Status: SHIPPED | OUTPATIENT
Start: 2020-03-30 | End: 2020-03-30 | Stop reason: SDUPTHER

## 2020-03-30 RX ORDER — OMEPRAZOLE 20 MG/1
20 CAPSULE, DELAYED RELEASE ORAL DAILY
Qty: 90 CAPSULE | Refills: 3 | Status: SHIPPED | OUTPATIENT
Start: 2020-03-30 | End: 2020-03-30 | Stop reason: SDUPTHER

## 2020-03-30 RX ORDER — SIMVASTATIN 40 MG
40 TABLET ORAL DAILY
Qty: 90 TABLET | Refills: 1 | Status: SHIPPED | OUTPATIENT
Start: 2020-03-30 | End: 2020-03-30 | Stop reason: SDUPTHER

## 2020-03-30 RX ORDER — SILDENAFIL 100 MG/1
100 TABLET, FILM COATED ORAL AS NEEDED
Qty: 6 TABLET | Refills: 5 | Status: SHIPPED | OUTPATIENT
Start: 2020-03-30 | End: 2020-06-18 | Stop reason: SDUPTHER

## 2020-03-30 RX ORDER — SILDENAFIL 100 MG/1
100 TABLET, FILM COATED ORAL AS NEEDED
Qty: 6 TABLET | Refills: 5 | Status: SHIPPED | OUTPATIENT
Start: 2020-03-30 | End: 2020-03-30 | Stop reason: SDUPTHER

## 2020-03-30 NOTE — TELEPHONE ENCOUNTER
Patient called and stated these were sent to wrong pharmacy  They need to go to [facility] pharmacy  Can you please send these to [facility]  Patient would like call back when completed    Thank you

## 2020-05-13 ENCOUNTER — TELEPHONE (OUTPATIENT)
Dept: FAMILY MEDICINE CLINIC | Facility: CLINIC | Age: 60
End: 2020-05-13

## 2020-05-18 ENCOUNTER — APPOINTMENT (OUTPATIENT)
Dept: RADIOLOGY | Facility: MEDICAL CENTER | Age: 60
End: 2020-05-18
Payer: COMMERCIAL

## 2020-05-18 ENCOUNTER — TRANSCRIBE ORDERS (OUTPATIENT)
Dept: ADMINISTRATIVE | Facility: HOSPITAL | Age: 60
End: 2020-05-18

## 2020-05-18 DIAGNOSIS — M99.01 CERVICAL SEGMENT DYSFUNCTION: Primary | ICD-10-CM

## 2020-05-18 DIAGNOSIS — M99.03 SOMATIC DYSFUNCTION OF LUMBAR REGION: ICD-10-CM

## 2020-05-18 DIAGNOSIS — M99.01 CERVICAL SEGMENT DYSFUNCTION: ICD-10-CM

## 2020-05-18 DIAGNOSIS — M99.02 THORACIC SEGMENT DYSFUNCTION: ICD-10-CM

## 2020-05-18 DIAGNOSIS — S13.8XXA SPRAIN OF JOINTS AND LIGAMENTS OF OTHER PARTS OF NECK, INITIAL ENCOUNTER: ICD-10-CM

## 2020-05-18 PROCEDURE — 72050 X-RAY EXAM NECK SPINE 4/5VWS: CPT

## 2020-06-18 DIAGNOSIS — I10 ESSENTIAL HYPERTENSION: ICD-10-CM

## 2020-06-18 DIAGNOSIS — K21.9 GASTROESOPHAGEAL REFLUX DISEASE WITHOUT ESOPHAGITIS: ICD-10-CM

## 2020-06-18 DIAGNOSIS — E78.5 HYPERLIPIDEMIA, UNSPECIFIED HYPERLIPIDEMIA TYPE: ICD-10-CM

## 2020-06-18 DIAGNOSIS — N52.9 ERECTILE DYSFUNCTION, UNSPECIFIED ERECTILE DYSFUNCTION TYPE: ICD-10-CM

## 2020-06-18 RX ORDER — OMEPRAZOLE 20 MG/1
20 CAPSULE, DELAYED RELEASE ORAL DAILY
Qty: 90 CAPSULE | Refills: 3 | Status: SHIPPED | OUTPATIENT
Start: 2020-06-18 | End: 2021-08-12 | Stop reason: SDUPTHER

## 2020-06-18 RX ORDER — SILDENAFIL 100 MG/1
100 TABLET, FILM COATED ORAL AS NEEDED
Qty: 18 TABLET | Refills: 5 | Status: SHIPPED | OUTPATIENT
Start: 2020-06-18 | End: 2021-06-28

## 2020-06-18 RX ORDER — SIMVASTATIN 40 MG
40 TABLET ORAL DAILY
Qty: 90 TABLET | Refills: 3 | Status: SHIPPED | OUTPATIENT
Start: 2020-06-18 | End: 2021-08-12 | Stop reason: SDUPTHER

## 2020-06-18 RX ORDER — LISINOPRIL AND HYDROCHLOROTHIAZIDE 20; 12.5 MG/1; MG/1
1 TABLET ORAL DAILY
Qty: 90 TABLET | Refills: 3 | Status: SHIPPED | OUTPATIENT
Start: 2020-06-18 | End: 2021-03-01

## 2020-08-17 ENCOUNTER — OFFICE VISIT (OUTPATIENT)
Dept: FAMILY MEDICINE CLINIC | Facility: CLINIC | Age: 60
End: 2020-08-17
Payer: COMMERCIAL

## 2020-08-17 VITALS
TEMPERATURE: 98.2 F | BODY MASS INDEX: 30.66 KG/M2 | DIASTOLIC BLOOD PRESSURE: 70 MMHG | OXYGEN SATURATION: 97 % | HEART RATE: 89 BPM | HEIGHT: 71 IN | WEIGHT: 219 LBS | SYSTOLIC BLOOD PRESSURE: 110 MMHG

## 2020-08-17 DIAGNOSIS — M54.2 NECK PAIN: Primary | ICD-10-CM

## 2020-08-17 DIAGNOSIS — S46.811A STRAIN OF RIGHT TRAPEZIUS MUSCLE, INITIAL ENCOUNTER: ICD-10-CM

## 2020-08-17 PROCEDURE — 1036F TOBACCO NON-USER: CPT | Performed by: NURSE PRACTITIONER

## 2020-08-17 PROCEDURE — 3074F SYST BP LT 130 MM HG: CPT | Performed by: NURSE PRACTITIONER

## 2020-08-17 PROCEDURE — 3008F BODY MASS INDEX DOCD: CPT | Performed by: NURSE PRACTITIONER

## 2020-08-17 PROCEDURE — 99213 OFFICE O/P EST LOW 20 MIN: CPT | Performed by: NURSE PRACTITIONER

## 2020-08-17 PROCEDURE — 3078F DIAST BP <80 MM HG: CPT | Performed by: NURSE PRACTITIONER

## 2020-08-17 RX ORDER — CYCLOBENZAPRINE HCL 10 MG
10 TABLET ORAL
Qty: 14 TABLET | Refills: 0 | Status: SHIPPED | OUTPATIENT
Start: 2020-08-17 | End: 2021-07-20

## 2020-08-17 RX ORDER — MELOXICAM 15 MG/1
15 TABLET ORAL DAILY
Qty: 14 TABLET | Refills: 0 | Status: SHIPPED | OUTPATIENT
Start: 2020-08-17 | End: 2021-07-20 | Stop reason: SDUPTHER

## 2020-08-17 RX ORDER — MINOCYCLINE HYDROCHLORIDE 50 MG/1
50 CAPSULE ORAL DAILY
COMMUNITY
Start: 2020-06-01 | End: 2022-07-21

## 2020-08-17 NOTE — PROGRESS NOTES
Assessment/Plan:    No problem-specific Assessment & Plan notes found for this encounter  Diagnoses and all orders for this visit:      Strain of right trapezius muscle, initial encounter  -     meloxicam (MOBIC) 15 mg tablet; Take 1 tablet (15 mg total) by mouth daily for 14 days  -     cyclobenzaprine (FLEXERIL) 10 mg tablet; Take 1 tablet (10 mg total) by mouth daily at bedtime for 14 days  Alternate heat and ice at least 10 minutes twice daily  Will try the above and if no improvement in 10-14 days, I strongly urged physical therapy which patient declined today  Subjective:      Patient ID: Nitza Baron is a 63 y o  male  Pt reports intermittent pain in the back of his neck with occ brief tingling and numbness in right forearm  Is mostly right-handed  Pain is worse with riding his motorcycle  Denies any injury, twisting, or lifting over-exertion  Has tried aleve, ice and heat but not on schedule  The following portions of the patient's history were reviewed and updated as appropriate: allergies, current medications, past family history, past medical history, past social history, past surgical history and problem list     Review of Systems   Constitutional: Negative for fever  Musculoskeletal: Positive for back pain  Negative for neck pain and neck stiffness  Neurological: Positive for numbness  Negative for dizziness, tremors and weakness  Objective:      /70 (BP Location: Left arm, Patient Position: Sitting, Cuff Size: Adult)   Pulse 89   Temp 98 2 °F (36 8 °C)   Ht 5' 11 26" (1 81 m)   Wt 99 3 kg (219 lb)   SpO2 97%   BMI 30 32 kg/m²          Physical Exam  Constitutional:       Appearance: Normal appearance  HENT:      Head: Normocephalic  Eyes:      Conjunctiva/sclera: Conjunctivae normal    Pulmonary:      Effort: Pulmonary effort is normal    Musculoskeletal:      Comments: FROM and M/S 5/5 neck and BUE   Hand grasps strong and equal  Reproducible tenderness upper right trapezius at T3 lateral to spine  No spinal tenderness  Neurological:      Mental Status: He is alert

## 2020-12-08 ENCOUNTER — TELEMEDICINE (OUTPATIENT)
Dept: FAMILY MEDICINE CLINIC | Facility: CLINIC | Age: 60
End: 2020-12-08
Payer: COMMERCIAL

## 2020-12-08 DIAGNOSIS — Z11.59 SCREENING FOR VIRAL DISEASE: Primary | ICD-10-CM

## 2020-12-08 DIAGNOSIS — Z11.59 SCREENING FOR VIRAL DISEASE: ICD-10-CM

## 2020-12-08 PROCEDURE — U0003 INFECTIOUS AGENT DETECTION BY NUCLEIC ACID (DNA OR RNA); SEVERE ACUTE RESPIRATORY SYNDROME CORONAVIRUS 2 (SARS-COV-2) (CORONAVIRUS DISEASE [COVID-19]), AMPLIFIED PROBE TECHNIQUE, MAKING USE OF HIGH THROUGHPUT TECHNOLOGIES AS DESCRIBED BY CMS-2020-01-R: HCPCS | Performed by: FAMILY MEDICINE

## 2020-12-08 PROCEDURE — 99213 OFFICE O/P EST LOW 20 MIN: CPT | Performed by: FAMILY MEDICINE

## 2020-12-09 LAB — SARS-COV-2 RNA SPEC QL NAA+PROBE: DETECTED

## 2020-12-10 ENCOUNTER — TELEMEDICINE (OUTPATIENT)
Dept: FAMILY MEDICINE CLINIC | Facility: CLINIC | Age: 60
End: 2020-12-10
Payer: COMMERCIAL

## 2020-12-10 DIAGNOSIS — U07.1 COVID-19 VIRUS INFECTION: Primary | ICD-10-CM

## 2020-12-10 PROCEDURE — 99213 OFFICE O/P EST LOW 20 MIN: CPT | Performed by: FAMILY MEDICINE

## 2020-12-15 ENCOUNTER — TELEMEDICINE (OUTPATIENT)
Dept: FAMILY MEDICINE CLINIC | Facility: CLINIC | Age: 60
End: 2020-12-15
Payer: COMMERCIAL

## 2020-12-15 DIAGNOSIS — U07.1 COVID-19 VIRUS INFECTION: Primary | ICD-10-CM

## 2020-12-15 PROCEDURE — 99213 OFFICE O/P EST LOW 20 MIN: CPT | Performed by: FAMILY MEDICINE

## 2020-12-15 PROCEDURE — 1036F TOBACCO NON-USER: CPT | Performed by: FAMILY MEDICINE

## 2020-12-21 ENCOUNTER — TELEMEDICINE (OUTPATIENT)
Dept: FAMILY MEDICINE CLINIC | Facility: CLINIC | Age: 60
End: 2020-12-21
Payer: COMMERCIAL

## 2020-12-21 DIAGNOSIS — U07.1 COVID-19 VIRUS INFECTION: Primary | ICD-10-CM

## 2020-12-21 PROCEDURE — 99213 OFFICE O/P EST LOW 20 MIN: CPT | Performed by: FAMILY MEDICINE

## 2021-01-04 ENCOUNTER — TELEMEDICINE (OUTPATIENT)
Dept: FAMILY MEDICINE CLINIC | Facility: CLINIC | Age: 61
End: 2021-01-04
Payer: COMMERCIAL

## 2021-01-04 ENCOUNTER — TELEPHONE (OUTPATIENT)
Dept: FAMILY MEDICINE CLINIC | Facility: CLINIC | Age: 61
End: 2021-01-04

## 2021-01-04 DIAGNOSIS — U07.1 COVID-19 VIRUS INFECTION: Primary | ICD-10-CM

## 2021-01-04 PROCEDURE — 99213 OFFICE O/P EST LOW 20 MIN: CPT | Performed by: FAMILY MEDICINE

## 2021-01-04 NOTE — TELEPHONE ENCOUNTER
Received a prior authorization for pt's Viagra from cover my meds  Prior auth started on cover my meds  Await response

## 2021-01-04 NOTE — PROGRESS NOTES
Virtual Regular Visit      Assessment/Plan:  Recommend patient continue present treatment and gradually increase activity level  Patient to call if he develops shortness of breath or pulse ox less than 90  Return the office p r n     Problem List Items Addressed This Visit     None      Visit Diagnoses     COVID-19 virus infection    -  Primary               Reason for visit is No chief complaint on file  Encounter provider Dre Adame DO    Provider located at [address] [place] [address] [phone]      Recent Visits  No visits were found meeting these conditions  Showing recent visits within past 7 days and meeting all other requirements     Today's Visits  Date Type Provider Dept   01/04/21 Telephone Abimbola Muhammad [id number] [facility]a today's visits and meeting all other requirements     Future Appointments  No visits were found meeting these conditions  Showing future appointments within next 150 days and meeting all other requirements        The patient was identified by name and date of birth  Robin Torres was informed that this is a telemedicine visit and that the visit is being conducted through [employer] and patient was informed that this is not a secure, HIPAA-compliant platform  He agrees to proceed     My office door was closed  No one else was in the room  He acknowledged consent and understanding of privacy and security of the video platform  The patient has agreed to participate and understands they can discontinue the visit at any time  Patient is aware this is a billable service  Subjective  Robin Torres is a 63 y o  male is being seen in follow-up for COVID-19 infection  Patient's symptoms started on 12/07/2020 and he tested positive on 12/08/2020  On 12/21/2020 he complained of continued fatigue and headaches as his cough had improved    He denies any shortness of breath and his O2 saturations were 92-96 percent  Patient has been taking vitamin-C, vitamin-D, zinc, Pepcid and Mucinex  Patient return to work on 12/28/2020 and he complains of continued occasional dry cough but denies shortness of breath  He admits to continued fatigue which persists  Patient denies fever or chills although admits to occasional sweats  He denies body aches  Appetite is good and he denies nausea, vomiting or diarrhea  Patient denies loss of sense of smell or taste  He is sleeping well 9-10 hours per night  HPI     Past Medical History:   Diagnosis Date    Hypogonadotropic hypogonadism ([facility] [address] )        Past Surgical History:   Procedure Laterality Date    TONSILLECTOMY         Current Outpatient Medications   Medication Sig Dispense Refill    b complex vitamins capsule Take by mouth      Cholecalciferol (VITAMIN D3) 5000 units CAPS Take 1 capsule by mouth daily        cyclobenzaprine (FLEXERIL) 10 mg tablet Take 1 tablet (10 mg total) by mouth daily at bedtime for 14 days 14 tablet 0    fluticasone (FLONASE) 50 mcg/act nasal spray 2 sprays into each nostril daily      lisinopril-hydrochlorothiazide (PRINZIDE,ZESTORETIC) 20-12 5 MG per tablet Take 1 tablet by mouth daily 90 tablet 3    meloxicam (MOBIC) 15 mg tablet Take 1 tablet (15 mg total) by mouth daily for 14 days 14 tablet 0    metroNIDAZOLE (METROGEL) 1 % gel Apply topically daily 45 g 0    minocycline (MINOCIN) 50 mg capsule Take 50 mg by mouth daily      Omega-3 Fatty Acids (FISH OIL) 1200 MG CAPS by Does not apply route      omeprazole (PriLOSEC) 20 mg delayed release capsule Take 1 capsule (20 mg total) by mouth daily 90 capsule 3    predniSONE 10 mg tablet Take 6 tablets daily for 3 days then 4 tablets daily for 3 days then 2 tablets daily for 3 days then 1 tablet daily for 3 days with food   (Patient not taking: Reported on 8/17/2020) 39 tablet 0    Psyllium (METAMUCIL FIBER SINGLES PO) Take by mouth      sildenafil (Viagra) 100 mg tablet Take 1 tablet (100 mg total) by mouth as needed for erectile dysfunction 18 tablet 5    simvastatin (ZOCOR) 40 mg tablet Take 1 tablet (40 mg total) by mouth daily 90 tablet 3     No current facility-administered medications for this visit  Allergies   Allergen Reactions    Moxifloxacin      Causes low BP       Review of Systems    Video Exam    There were no vitals filed for this visit  Physical Exam  Constitutional:       General: He is not in acute distress  Appearance: Normal appearance  He is not ill-appearing, toxic-appearing or diaphoretic  HENT:      Head: Normocephalic  Eyes:      General: No scleral icterus  Conjunctiva/sclera: Conjunctivae normal    Pulmonary:      Effort: Pulmonary effort is normal       Comments: Patient is talking in complete sentences without shortness of breath or cough  Neurological:      Mental Status: He is alert and oriented to person, place, and time  Psychiatric:         Mood and Affect: Mood normal           I spent 15 minutes directly with the patient during this visit      VIRTUAL VISIT DISCLAIMER    Flavia Barr acknowledges that he has consented to an online visit or consultation  He understands that the online visit is based solely on information provided by him, and that, in the absence of a face-to-face physical evaluation by the physician, the diagnosis he receives is both limited and provisional in terms of accuracy and completeness  This is not intended to replace a full medical face-to-face evaluation by the physician  Flavia Barr understands and accepts these terms

## 2021-01-18 ENCOUNTER — TELEMEDICINE (OUTPATIENT)
Dept: FAMILY MEDICINE CLINIC | Facility: CLINIC | Age: 61
End: 2021-01-18
Payer: COMMERCIAL

## 2021-01-18 ENCOUNTER — APPOINTMENT (OUTPATIENT)
Dept: RADIOLOGY | Facility: MEDICAL CENTER | Age: 61
End: 2021-01-18
Payer: COMMERCIAL

## 2021-01-18 ENCOUNTER — TELEPHONE (OUTPATIENT)
Dept: FAMILY MEDICINE CLINIC | Facility: CLINIC | Age: 61
End: 2021-01-18

## 2021-01-18 DIAGNOSIS — R05.9 COUGH: ICD-10-CM

## 2021-01-18 DIAGNOSIS — R06.02 SHORTNESS OF BREATH: ICD-10-CM

## 2021-01-18 DIAGNOSIS — R53.83 FATIGUE, UNSPECIFIED TYPE: ICD-10-CM

## 2021-01-18 DIAGNOSIS — U07.1 COVID-19 VIRUS INFECTION: ICD-10-CM

## 2021-01-18 DIAGNOSIS — U07.1 COVID-19 VIRUS INFECTION: Primary | ICD-10-CM

## 2021-01-18 PROCEDURE — 71046 X-RAY EXAM CHEST 2 VIEWS: CPT

## 2021-01-18 PROCEDURE — 99214 OFFICE O/P EST MOD 30 MIN: CPT | Performed by: FAMILY MEDICINE

## 2021-01-18 NOTE — LETTER
January 18, 2021     Patient: Lorene Dean   YOB: 1960   Date of Visit: 1/18/2021       To Whom it May Concern:    Tarah Elver is under my professional care  He had a virtual visit on 1/18/2021  He is to remain out of work this week 1/18/21-1/22/21 and until cleared by the doctor  If you have any questions or concerns, please don't hesitate to call           Sincerely,          Tracy Roy DO        CC: No Recipients

## 2021-01-18 NOTE — PROGRESS NOTES
Virtual Regular Visit      Assessment/Plan:  Discussed diagnostic and treatment options with patient and wife  Patient is being sent for chest x-ray and will be scheduled for echocardiogram   Patient is being referred to [facility] for further evaluation  Recommend patient remain out of work this week  Follow-up next week or call sooner p r n     Will send patient a work note through my chart  Problem List Items Addressed This Visit     None      Visit Diagnoses     COVID-19 virus infection    -  Primary    Relevant Orders    XR chest pa & lateral    Shortness of breath        Relevant Orders    XR chest pa & lateral    Echo complete with contrast if indicated    Ambulatory referral to Cardiology    Fatigue, unspecified type        Relevant Orders    Echo complete with contrast if indicated    Ambulatory referral to Cardiology    Cough        Relevant Orders    XR chest pa & lateral               Reason for visit is No chief complaint on file  Encounter provider Franco Diallo DO    Provider located at [address] [place] [address] [phone]      Recent Visits  No visits were found meeting these conditions  Showing recent visits within past 7 days and meeting all other requirements     Future Appointments  No visits were found meeting these conditions  Showing future appointments within next 150 days and meeting all other requirements        The patient was identified by name and date of birth  Edilma Huerta was informed that this is a telemedicine visit and that the visit is being conducted through [employer] and patient was informed that this is not a secure, HIPAA-compliant platform  He agrees to proceed     My office door was closed  No one else was in the room  He acknowledged consent and understanding of privacy and security of the video platform   The patient has agreed to participate and understands they can discontinue the visit at any time  Patient is aware this is a billable service  Subjective  Shanti Jordan is a 63 y o  male is being seen in follow-up for COVID-19 infection diagnosed on 12/08/2020  Patient complains of not feeling well  He complains of continued fatigue and cough with occasional sweats  Patient denies chest pain although admits to occasional shortness of breath  Appetite is decreased  He denies nausea, vomiting or diarrhea  He has been tolerating fluids well  Patient complains of headache and neck pain with occasional lightheadedness but denies dizziness or vertigo  He denies fever or chills  He denies loss of sense of smell or taste  Patient admits to intermittent numbness of his left hand  Temperature is 97 2° oxygen saturations 95% and blood pressure is 125/76 at home  Patient had return to work on 12/28/2020 although does not feel capable of working at this time secondary to symptoms        HPI     Past Medical History:   Diagnosis Date    Hypogonadotropic hypogonadism ([facility] [address] )        Past Surgical History:   Procedure Laterality Date    TONSILLECTOMY         Current Outpatient Medications   Medication Sig Dispense Refill    b complex vitamins capsule Take by mouth      Cholecalciferol (VITAMIN D3) 5000 units CAPS Take 1 capsule by mouth daily        cyclobenzaprine (FLEXERIL) 10 mg tablet Take 1 tablet (10 mg total) by mouth daily at bedtime for 14 days 14 tablet 0    fluticasone (FLONASE) 50 mcg/act nasal spray 2 sprays into each nostril daily      lisinopril-hydrochlorothiazide (PRINZIDE,ZESTORETIC) 20-12 5 MG per tablet Take 1 tablet by mouth daily 90 tablet 3    meloxicam (MOBIC) 15 mg tablet Take 1 tablet (15 mg total) by mouth daily for 14 days 14 tablet 0    metroNIDAZOLE (METROGEL) 1 % gel Apply topically daily 45 g 0    minocycline (MINOCIN) 50 mg capsule Take 50 mg by mouth daily      Omega-3 Fatty Acids (FISH OIL) 1200 MG CAPS by Does not apply route      omeprazole (PriLOSEC) 20 mg delayed release capsule Take 1 capsule (20 mg total) by mouth daily 90 capsule 3    predniSONE 10 mg tablet Take 6 tablets daily for 3 days then 4 tablets daily for 3 days then 2 tablets daily for 3 days then 1 tablet daily for 3 days with food  (Patient not taking: Reported on 8/17/2020) 39 tablet 0    Psyllium (METAMUCIL FIBER SINGLES PO) Take by mouth      sildenafil (Viagra) 100 mg tablet Take 1 tablet (100 mg total) by mouth as needed for erectile dysfunction 18 tablet 5    simvastatin (ZOCOR) 40 mg tablet Take 1 tablet (40 mg total) by mouth daily 90 tablet 3     No current facility-administered medications for this visit  Allergies   Allergen Reactions    Moxifloxacin      Causes low BP       Review of Systems    Video Exam    There were no vitals filed for this visit  Physical Exam  Constitutional:       General: He is not in acute distress  Appearance: Normal appearance  He is ill-appearing  He is not toxic-appearing or diaphoretic  Comments: Patient appears fatigued   HENT:      Head: Normocephalic  Mouth/Throat:      Mouth: Mucous membranes are moist    Eyes:      General: No scleral icterus  Conjunctiva/sclera: Conjunctivae normal    Pulmonary:      Effort: Pulmonary effort is normal  No respiratory distress  Comments: Patient is talking in complete sentences without shortness of breath or cough  Neurological:      Mental Status: He is alert and oriented to person, place, and time  Psychiatric:         Mood and Affect: Mood normal          Behavior: Behavior normal          Thought Content: Thought content normal          Judgment: Judgment normal           I spent 25 minutes directly with the patient during this visit      [address] [place] [address] acknowledges that he has consented to an online visit or consultation   He understands that the online visit is based solely on information provided by him, and that, in the absence of a face-to-face physical evaluation by the physician, the diagnosis he receives is both limited and provisional in terms of accuracy and completeness  This is not intended to replace a full medical face-to-face evaluation by the physician  Elba Haile understands and accepts these terms

## 2021-01-19 NOTE — TELEPHONE ENCOUNTER
LMOM for pt to call back to advise him of appts that  I made for him today per Dr Clarice Keys instructions:    Echo complete w/contrast if indicated - 1/20 @1:00PM - [facility] w/Cardiology 2/2 @ 3:40 PM - [address] [place] [address]      Also letter for work was sent to pt through [employer] 
Pt called back and was advised 
Statement Selected

## 2021-01-20 ENCOUNTER — HOSPITAL ENCOUNTER (OUTPATIENT)
Dept: NON INVASIVE DIAGNOSTICS | Facility: HOSPITAL | Age: 61
Discharge: HOME | End: 2021-01-20
Payer: COMMERCIAL

## 2021-01-20 DIAGNOSIS — R06.02 SHORTNESS OF BREATH: ICD-10-CM

## 2021-01-20 DIAGNOSIS — R53.83 FATIGUE, UNSPECIFIED TYPE: ICD-10-CM

## 2021-01-20 PROCEDURE — 93306 TTE W/DOPPLER COMPLETE: CPT | Performed by: INTERNAL MEDICINE

## 2021-01-20 PROCEDURE — 93306 TTE W/DOPPLER COMPLETE: CPT

## 2021-02-04 ENCOUNTER — TELEMEDICINE (OUTPATIENT)
Dept: FAMILY MEDICINE CLINIC | Facility: CLINIC | Age: 61
End: 2021-02-04
Payer: COMMERCIAL

## 2021-02-04 DIAGNOSIS — Z12.5 SCREENING PSA (PROSTATE SPECIFIC ANTIGEN): ICD-10-CM

## 2021-02-04 DIAGNOSIS — G93.3 POSTVIRAL FATIGUE SYNDROME: ICD-10-CM

## 2021-02-04 DIAGNOSIS — R06.02 SOB (SHORTNESS OF BREATH): ICD-10-CM

## 2021-02-04 DIAGNOSIS — E55.9 VITAMIN D DEFICIENCY: ICD-10-CM

## 2021-02-04 DIAGNOSIS — U07.1 COVID-19 VIRUS INFECTION: Primary | ICD-10-CM

## 2021-02-04 DIAGNOSIS — I10 ESSENTIAL HYPERTENSION: ICD-10-CM

## 2021-02-04 DIAGNOSIS — E78.5 HYPERLIPIDEMIA, UNSPECIFIED HYPERLIPIDEMIA TYPE: ICD-10-CM

## 2021-02-04 PROCEDURE — 99214 OFFICE O/P EST MOD 30 MIN: CPT | Performed by: FAMILY MEDICINE

## 2021-02-04 RX ORDER — METHYLPREDNISOLONE 4 MG/1
TABLET ORAL
Qty: 21 EACH | Refills: 0 | Status: SHIPPED | OUTPATIENT
Start: 2021-02-04 | End: 2021-07-20
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
## 2021-02-08 ENCOUNTER — LAB (OUTPATIENT)
Dept: LAB | Facility: MEDICAL CENTER | Age: 61
End: 2021-02-08
Payer: COMMERCIAL

## 2021-02-08 DIAGNOSIS — D72.829 LEUKOCYTOSIS, UNSPECIFIED TYPE: Primary | ICD-10-CM

## 2021-02-08 DIAGNOSIS — G93.3 POSTVIRAL FATIGUE SYNDROME: ICD-10-CM

## 2021-02-08 DIAGNOSIS — E78.5 HYPERLIPIDEMIA, UNSPECIFIED HYPERLIPIDEMIA TYPE: ICD-10-CM

## 2021-02-08 DIAGNOSIS — I10 ESSENTIAL HYPERTENSION: ICD-10-CM

## 2021-02-08 DIAGNOSIS — E55.9 VITAMIN D DEFICIENCY: ICD-10-CM

## 2021-02-08 DIAGNOSIS — R79.89 ELEVATED PLATELET COUNT: ICD-10-CM

## 2021-02-08 DIAGNOSIS — Z12.5 SCREENING PSA (PROSTATE SPECIFIC ANTIGEN): ICD-10-CM

## 2021-02-08 LAB
25(OH)D3 SERPL-MCNC: 59.5 NG/ML (ref 30–100)
ALBUMIN SERPL BCP-MCNC: 4.2 G/DL (ref 3.5–5)
ALP SERPL-CCNC: 83 U/L (ref 46–116)
ALT SERPL W P-5'-P-CCNC: 53 U/L (ref 12–78)
ANION GAP SERPL CALCULATED.3IONS-SCNC: 8 MMOL/L (ref 4–13)
AST SERPL W P-5'-P-CCNC: 23 U/L (ref 5–45)
BILIRUB SERPL-MCNC: 0.64 MG/DL (ref 0.2–1)
BILIRUB UR QL STRIP: NEGATIVE
BUN SERPL-MCNC: 26 MG/DL (ref 5–25)
CALCIUM SERPL-MCNC: 10 MG/DL (ref 8.3–10.1)
CHLORIDE SERPL-SCNC: 101 MMOL/L (ref 100–108)
CHOLEST SERPL-MCNC: 192 MG/DL (ref 50–200)
CLARITY UR: CLEAR
CO2 SERPL-SCNC: 26 MMOL/L (ref 21–32)
COLOR UR: YELLOW
CREAT SERPL-MCNC: 1.05 MG/DL (ref 0.6–1.3)
ERYTHROCYTE [DISTWIDTH] IN BLOOD BY AUTOMATED COUNT: 13 % (ref 11.6–15.1)
GFR SERPL CREATININE-BSD FRML MDRD: 76 ML/MIN/1.73SQ M
GLUCOSE P FAST SERPL-MCNC: 93 MG/DL (ref 65–99)
GLUCOSE UR STRIP-MCNC: NEGATIVE MG/DL
HCT VFR BLD AUTO: 46.9 % (ref 36.5–49.3)
HDLC SERPL-MCNC: 64 MG/DL
HGB BLD-MCNC: 15.2 G/DL (ref 12–17)
HGB UR QL STRIP.AUTO: NEGATIVE
KETONES UR STRIP-MCNC: NEGATIVE MG/DL
LDLC SERPL CALC-MCNC: 94 MG/DL (ref 0–100)
LEUKOCYTE ESTERASE UR QL STRIP: NEGATIVE
MCH RBC QN AUTO: 29.6 PG (ref 26.8–34.3)
MCHC RBC AUTO-ENTMCNC: 32.4 G/DL (ref 31.4–37.4)
MCV RBC AUTO: 91 FL (ref 82–98)
NITRITE UR QL STRIP: NEGATIVE
NONHDLC SERPL-MCNC: 128 MG/DL
PH UR STRIP.AUTO: 6 [PH]
PLATELET # BLD AUTO: 513 THOUSANDS/UL (ref 149–390)
PMV BLD AUTO: 9.9 FL (ref 8.9–12.7)
POTASSIUM SERPL-SCNC: 4.5 MMOL/L (ref 3.5–5.3)
PROT SERPL-MCNC: 8.5 G/DL (ref 6.4–8.2)
PROT UR STRIP-MCNC: NEGATIVE MG/DL
PSA SERPL-MCNC: 0.6 NG/ML (ref 0–4)
RBC # BLD AUTO: 5.13 MILLION/UL (ref 3.88–5.62)
SODIUM SERPL-SCNC: 135 MMOL/L (ref 136–145)
SP GR UR STRIP.AUTO: 1.02 (ref 1–1.03)
TRIGL SERPL-MCNC: 169 MG/DL
TSH SERPL DL<=0.05 MIU/L-ACNC: 1.98 UIU/ML (ref 0.36–3.74)
UROBILINOGEN UR QL STRIP.AUTO: 0.2 E.U./DL
WBC # BLD AUTO: 11.89 THOUSAND/UL (ref 4.31–10.16)

## 2021-02-08 PROCEDURE — 80061 LIPID PANEL: CPT

## 2021-02-08 PROCEDURE — 81003 URINALYSIS AUTO W/O SCOPE: CPT

## 2021-02-08 PROCEDURE — 85027 COMPLETE CBC AUTOMATED: CPT

## 2021-02-08 PROCEDURE — 84443 ASSAY THYROID STIM HORMONE: CPT

## 2021-02-08 PROCEDURE — 82306 VITAMIN D 25 HYDROXY: CPT

## 2021-02-08 PROCEDURE — 36415 COLL VENOUS BLD VENIPUNCTURE: CPT

## 2021-02-08 PROCEDURE — 80053 COMPREHEN METABOLIC PANEL: CPT

## 2021-02-08 PROCEDURE — G0103 PSA SCREENING: HCPCS

## 2021-03-01 ENCOUNTER — CONSULT (OUTPATIENT)
Dept: CARDIOLOGY CLINIC | Facility: CLINIC | Age: 61
End: 2021-03-01
Payer: COMMERCIAL

## 2021-03-01 VITALS
SYSTOLIC BLOOD PRESSURE: 124 MMHG | BODY MASS INDEX: 30.66 KG/M2 | WEIGHT: 219 LBS | DIASTOLIC BLOOD PRESSURE: 74 MMHG | HEIGHT: 71 IN | HEART RATE: 88 BPM | TEMPERATURE: 98.5 F

## 2021-03-01 DIAGNOSIS — R06.02 SHORTNESS OF BREATH: ICD-10-CM

## 2021-03-01 DIAGNOSIS — I10 ESSENTIAL HYPERTENSION: ICD-10-CM

## 2021-03-01 DIAGNOSIS — R53.83 FATIGUE, UNSPECIFIED TYPE: ICD-10-CM

## 2021-03-01 DIAGNOSIS — R06.83 SNORING: Primary | ICD-10-CM

## 2021-03-01 PROCEDURE — 93000 ELECTROCARDIOGRAM COMPLETE: CPT | Performed by: INTERNAL MEDICINE

## 2021-03-01 PROCEDURE — 99244 OFF/OP CNSLTJ NEW/EST MOD 40: CPT | Performed by: INTERNAL MEDICINE

## 2021-03-01 RX ORDER — LISINOPRIL 20 MG/1
20 TABLET ORAL DAILY
Qty: 90 TABLET | Refills: 1 | Status: SHIPPED | OUTPATIENT
Start: 2021-03-01 | End: 2021-08-12 | Stop reason: SDUPTHER

## 2021-03-01 NOTE — PROGRESS NOTES
Cardiology Office Note  MD Randy Cabrera MD Garald Hock, DO, [facility] MD Cole Caceres DO, Kacie Wilhelm DO, [facility] [place]  ----------------------------------------------------------------  [address]  [address], [address] [facility]    Yelitza Benoit 57 y o  male MRN: [id number]  Unit/Bed#:  Encounter: [id number]      History of Present Illness: It was a pleasure to see Yelitza Benoit in the office today for initial CV evaluation  He has a past medical history of hypertension, dyslipidemia, reflux disease and COVID-19 infection in December 2020  He has been experiencing constellation of symptoms including shortness of breath with exertion and fatigue since his COVID-19 infection December 2020  He knows that he used to have increased physical activity level and less fatigued but has been unable to get back his baseline level of functioning  It is required him to take multiple leads from work due to the severity of his symptoms  He has undergone workup including an echocardiogram for which he is here to review the results today  Blood pressure has also transiently been elevated on prednisone  He is currently off of prednisone medication  Separately, he intermittently has noted that when he goes from the sitting to standing position he occasionally gets some mild lightheadedness  If he rests in between going from sitting to standing, his symptoms are less significant  Additionally on questioning, he states that he does snore and is been found to have episodes where he is gasping for air in the middle of the night  No chest pain, pressure, tightness or squeezing  Denies lower extremity swelling, orthopnea or paroxysmal nocturnal dyspnea  Denies loss of consciousness  Review of Systems:  Review of Systems   Constitution: Positive for malaise/fatigue  Negative for decreased appetite, fever, weight gain and weight loss     HENT: Negative for congestion and sore throat  Eyes: Negative for visual disturbance  Cardiovascular: Positive for dyspnea on exertion  Negative for chest pain, leg swelling, near-syncope and palpitations  Respiratory: Negative for cough and shortness of breath  Hematologic/Lymphatic: Negative for bleeding problem  Skin: Negative for rash  Musculoskeletal: Negative for myalgias and neck pain  Gastrointestinal: Negative for abdominal pain and nausea  Neurological: Negative for light-headedness and weakness  Psychiatric/Behavioral: Negative for depression         Past Medical History:   Diagnosis Date    Hypogonadotropic hypogonadism [facility])        Past Surgical History:   Procedure Laterality Date    TONSILLECTOMY         Social History     Socioeconomic History    Marital status: /Civil Union     Spouse name: Not on file    Number of children: Not on file    Years of education: Not on file    Highest education level: Not on file   Occupational History    Not on file   Social Needs    Financial resource strain: Not on file    Food insecurity     Worry: Not on file     Inability: Not on file   [employer] needs     Medical: Not on file     Non-medical: Not on file   Tobacco Use    Smoking status: Never Smoker    Smokeless tobacco: Former User     Types: Chew    Tobacco comment: former chewer pt stopped ? yrs ago   Substance and Sexual Activity    Alcohol use: Yes     Frequency: 2-3 times a week     Comment: weekly; social as per allscripts    Drug use: No    Sexual activity: Yes     Partners: Female   Lifestyle    Physical activity     Days per week: Not on file     Minutes per session: Not on file    Stress: Not on file   Relationships    Social connections     Talks on phone: Not on file     Gets together: Not on file     Attends Taoism service: Not on file     Active member of club or organization: Not on file     Attends meetings of clubs or organizations: Not on file Relationship status: Not on file    Intimate partner violence     Fear of current or ex partner: Not on file     Emotionally abused: Not on file     Physically abused: Not on file     Forced sexual activity: Not on file   Other Topics Concern    Not on file   Social History Narrative    Caffeine - 1-2 cups of coffee per day    Full Time -        Family History   Problem Relation Age of Onset    Hypertension Mother     Kidney failure Mother     Hypertension Father     Hypertension Brother     Heart attack Brother     Heart attack Brother         45       Allergies   Allergen Reactions    Moxifloxacin      Causes low BP         Current Outpatient Medications:     b complex vitamins capsule, Take by mouth, Disp: , Rfl:     Cholecalciferol (VITAMIN D3) 5000 units CAPS, Take 1 capsule by mouth daily  , Disp: , Rfl:     fluticasone (FLONASE) 50 mcg/act nasal spray, 2 sprays into each nostril daily, Disp: , Rfl:     lisinopril-hydrochlorothiazide (PRINZIDE,ZESTORETIC) 20-12 5 MG per tablet, Take 1 tablet by mouth daily, Disp: 90 tablet, Rfl: 3    methylPREDNISolone 4 MG tablet therapy pack, Use as directed on package, Disp: 21 each, Rfl: 0    metroNIDAZOLE (METROGEL) 1 % gel, Apply topically daily, Disp: 45 g, Rfl: 0    minocycline (MINOCIN) 50 mg capsule, Take 50 mg by mouth daily, Disp: , Rfl:     Omega-3 Fatty Acids (FISH OIL) 1200 MG CAPS, by Does not apply route, Disp: , Rfl:     omeprazole (PriLOSEC) 20 mg delayed release capsule, Take 1 capsule (20 mg total) by mouth daily, Disp: 90 capsule, Rfl: 3    Psyllium (METAMUCIL FIBER SINGLES PO), Take by mouth, Disp: , Rfl:     sildenafil (Viagra) 100 mg tablet, Take 1 tablet (100 mg total) by mouth as needed for erectile dysfunction, Disp: 18 tablet, Rfl: 5    simvastatin (ZOCOR) 40 mg tablet, Take 1 tablet (40 mg total) by mouth daily, Disp: 90 tablet, Rfl: 3    cyclobenzaprine (FLEXERIL) 10 mg tablet, Take 1 tablet (10 mg total) by mouth daily at bedtime for 14 days, Disp: 14 tablet, Rfl: 0    lisinopril (ZESTRIL) 20 mg tablet, Take 1 tablet (20 mg total) by mouth daily, Disp: 90 tablet, Rfl: 1    meloxicam (MOBIC) 15 mg tablet, Take 1 tablet (15 mg total) by mouth daily for 14 days, Disp: 14 tablet, Rfl: 0    predniSONE 10 mg tablet, Take 6 tablets daily for 3 days then 4 tablets daily for 3 days then 2 tablets daily for 3 days then 1 tablet daily for 3 days with food  (Patient not taking: Reported on 3/1/2021), Disp: 39 tablet, Rfl: 0    Vitals:    03/01/21 1509   BP: 124/74   Pulse: 88   Temp: 98 5 °F (36 9 °C)   Weight: 99 3 kg (219 lb)   Height: 5' 11" (1 803 m)       PHYSICAL EXAMINATION:  Gen: Awake, Alert, NAD    HEENT: AT/NC, Anicteric, mmm  Neck: Supple, No elevated JVP  Resp: CTA bilaterally no w/r/r  CV: RRR +S1, S2, No m/r/g  Abd: Soft, Obese, NT/ND + BS  Ext: warm, no LE edema bilaterally  Neuro: Follows commands, moves all extermities  Psych: Appropriate affect    --------------------------------------------------------------------------------  TREADMILL STRESS  No results found for this or any previous visit    --------------------------------------------------------------------------------  NUCLEAR STRESS TEST: No results found for this or any previous visit  No results found for this or any previous visit     --------------------------------------------------------------------------------  CATH:  No results found for this or any previous visit   --------------------------------------------------------------------------------  ECHO:   Results for orders placed during the hospital encounter of 01/20/21   Echo complete with contrast if indicated    [address], [address] [place] [address]  [phone]    Transthoracic Echocardiogram  2D, M-mode, Doppler, and Color Doppler    Study date:  20-Jan-2021    Patient: Donya Lai  MR number: [id number]  Account number: [id number]  : 1960  Age: 63 years  Gender: Male  Status: Outpatient  Location: Echo lab  Height: 71 in  Weight: 218 5 lb  BP: 110/ 70 mmHg    Indications: Shortness of breath  Indications reviewed by performing physician  Diagnoses: R06 02 - Shortness of breath    Sonographer:  Kathrine Hagan RDCS  Referring Physician:  Shalom Velez DO  Group:  [facility]  Interpreting Physician:  Alejandra Springer DO    SUMMARY    LEFT VENTRICLE:  Systolic function was normal  Ejection fraction was estimated to be 55 %  There were no regional wall motion abnormalities  Doppler parameters were consistent with abnormal left ventricular relaxation (grade 1 diastolic dysfunction)  SUMMARY MEASUREMENTS  PW measurements:  Unspecified Anatomy:   E' Sept was 0 1 m/s  E/E' Sept was 7 2   MV E/A Ratio was 0 7   MV PHT was 65 2 ms  MVA By PHT was 3 4 cm2  HISTORY: PRIOR HISTORY: Hypertension  Hyperlipidemia  Covid- 19  PROCEDURE: The procedure was performed in the echo lab  This was a routine study  The transthoracic approach was used  The study included complete 2D imaging, M-mode, complete spectral Doppler, and color Doppler  The heart rate was 87 bpm,  at the start of the study  Images were obtained from the parasternal, apical, subcostal, and suprasternal notch acoustic windows  Echocardiographic views were limited due to lung interference  Image quality was adequate  LEFT VENTRICLE: Size was normal  Systolic function was normal  Ejection fraction was estimated to be 55 %  There were no regional wall motion abnormalities  Wall thickness was normal  DOPPLER: Doppler parameters were consistent with  abnormal left ventricular relaxation (grade 1 diastolic dysfunction)      RIGHT VENTRICLE: The size was normal  Systolic function was normal  Wall thickness was normal     LEFT ATRIUM: Size was normal     RIGHT ATRIUM: Size was normal     MITRAL VALVE: Valve structure was normal  There was normal leaflet separation  DOPPLER: The transmitral velocity was within the normal range  There was no evidence for stenosis  There was no regurgitation  AORTIC VALVE: The valve was trileaflet  Leaflets exhibited mildly increased thickness, normal cuspal separation, and sclerosis  DOPPLER: Transaortic velocity was within the normal range  There was no evidence for stenosis  There was no  regurgitation  TRICUSPID VALVE: The valve structure was normal  There was normal leaflet separation  DOPPLER: The transtricuspid velocity was within the normal range  There was no evidence for stenosis  There was no regurgitation  PULMONIC VALVE: Not well visualized  DOPPLER: The transpulmonic velocity was within the normal range  There was no evidence for stenosis  There was no regurgitation  PERICARDIUM: There was no pericardial effusion  The pericardium was normal in appearance  AORTA: The root exhibited normal size  SYSTEMIC VEINS: IVC: The inferior vena cava was not well visualized  PULMONARY ARTERY: DOPPLER: The tricuspid jet envelope definition was inadequate for estimation of RV systolic pressure  There are no indirect findings (abnormal RV volume or geometry, altered pulmonary flow velocity profile, or leftward  septal displacement) which would suggest moderate or severe pulmonary hypertension      SYSTEM MEASUREMENT TABLES    2D  %FS: 35 7 %  Ao Diam: 3 4 cm  EDV(Teich): 73 ml  EF(Teich): 65 7 %  ESV(Teich): 25 1 ml  IVSd: 1 cm  LA Area: 11 5 cm2  LA Diam: 3 7 cm  LVEDV MOD A4C: 86 7 ml  LVEF MOD A4C: 51 9 %  LVESV MOD A4C: 41 7 ml  LVIDd: 4 1 cm  LVIDs: 2 6 cm  LVLd A4C: 8 1 cm  LVLs A4C: 6 5 cm  LVPWd: 1 1 cm  RA Area: 11 6 cm2  RVIDd: 3 cm  SV MOD A4C: 45 ml  SV(Teich): 47 9 ml    MM  TAPSE: 2 cm    PW  E' Sept: 0 1 m/s  E/E' Sept: 7 2  MV A Aristeo: 0 9 m/s  MV Dec Sharkey: 2 9 m/s2  MV DecT: 224 8 ms  MV E Aristeo: 0 7 m/s  MV E/A Ratio: 0 7  MV PHT: 65 2 ms  MVA By PHT: 3 4 cm2    [employer] Accredited Echocardiography Laboratory    Prepared and electronically signed by    Cole Begum DO  Signed 20-Jan-2021 14:33:52       No results found for this or any previous visit   --------------------------------------------------------------------------------  HOLTER  No results found for this or any previous visit  No results found for this or any previous visit   --------------------------------------------------------------------------------  CAROTIDS  No results found for this or any previous visit    --------------------------------------------------------------------------------  ECGs:  Results for orders placed or performed in visit on 03/01/21   POCT ECG    Impression    Sinus rhythm 88 beats per minute, normal ECG        Lab Results   Component Value Date    WBC 11 89 (H) 02/08/2021    HGB 15 2 02/08/2021    HCT 46 9 02/08/2021    MCV 91 02/08/2021     (H) 02/08/2021      Lab Results   Component Value Date    SODIUM 135 (L) 02/08/2021    K 4 5 02/08/2021     02/08/2021    CO2 26 02/08/2021    BUN 26 (H) 02/08/2021    CREATININE 1 05 02/08/2021    GLUC 114 07/16/2018    CALCIUM 10 0 02/08/2021      Lab Results   Component Value Date    HGBA1C 5 3 07/17/2018      Lab Results   Component Value Date    CHOL 151 11/13/2015    CHOL 184 11/12/2014     Lab Results   Component Value Date    HDL 64 02/08/2021    HDL 56 12/19/2019    HDL 56 12/18/2018     Lab Results   Component Value Date    LDLCALC 94 02/08/2021    LDLCALC 89 12/19/2019    LDLCALC 83 12/18/2018     Lab Results   Component Value Date    TRIG 169 (H) 02/08/2021    TRIG 121 12/19/2019    TRIG 147 12/18/2018     No results found for: CHOLHDL   No results found for: INR, PROTIME     1  Snoring  -     Ambulatory referral to Sleep Medicine; Future    2  Shortness of breath  -     Ambulatory referral to Cardiology  -     POCT ECG  -     Stress test only, exercise; Future; Expected date: 03/01/2021    3   Fatigue, unspecified type  - Ambulatory referral to Cardiology  -     POCT ECG  -     Ambulatory referral to Sleep Medicine; Future    4  Essential hypertension  -     lisinopril (ZESTRIL) 20 mg tablet; Take 1 tablet (20 mg total) by mouth daily        IMPRESSION:  · Dyspnea on exertion  · Fatigue  · Hypertension  · Dyslipidemia with LDL 94 mg/dL,  mg/dL, February 2021  · LVEF 35%, grade 1 diastolic dysfunction, January 2021  · Fatty Liver disease  · History of COVID 19 infection, December 2020  · GERD    PLAN:  It was a pleasure to see Cl Porter in the office today for initial CV evaluation  He is here today due to significant fatigue and shortness of breath  His symptoms started after COVID-19 infection described as marked fatigue during day with some daytime somnolence  He does have some dyspnea on exertion for which she underwent echocardiogram   Echocardiogram was found to have normal cardiac structure and function with only mild diastolic dysfunction  No significant indications of elevated filling pressures  I have shared with him the stable news  Blood pressure was stable in the office today  He does have symptoms of lightheadedness going from the sitting to standing position indicating some mild orthostasis  His ECG is nonischemic during her current encounter  He has no signs of heart failure and examines to be euvolemic  Based on his clinical presentation, I have the following recommendations:    1  Recommend checking exercise stress test to assess for any evidence of underlying myocardial ischemia associated with his dyspnea on exertion  2  I have referred him to Sleep Medicine due to his combination of snoring and daytime somnolence  Symptoms certainly could be consistent with untreated sleep apnea  3  Due to his lightheadedness going from the sitting to standing position, I believe that he may benefit from discontinuation of his hydrochlorothiazide medication  I have recommended starting lisinopril 20 mg daily alone  Should his blood pressure be elevated off of the hydrochlorothiazide, can further up titrate his lisinopril as needed  4  LDL cholesterol is currently to goal of less than 100 mg/dL  I reviewed his current lipid panel  Continue simvastatin  5  If stress test is negative for myocardial ischemia, recommended patient continue to push up his physical activity level to help mitigate future cardiovascular risk  6  As always, I recommended a heart healthy diet low in sodium  7  We will follow up with him after testing  As always, please do not hesitate to call with any questions  Portions of the record may have been created with voice recognition software  Occasional wrong word or "sound a like" substitutions may have occurred due to the inherent limitations of voice recognition software  Read the chart carefully and recognize, using context, where substitutions have occurred          Signed: Fang Uribe DO, [facility]

## 2021-03-03 ENCOUNTER — OFFICE VISIT (OUTPATIENT)
Dept: SLEEP CENTER | Facility: CLINIC | Age: 61
End: 2021-03-03
Payer: COMMERCIAL

## 2021-03-03 VITALS
SYSTOLIC BLOOD PRESSURE: 114 MMHG | BODY MASS INDEX: 31.11 KG/M2 | WEIGHT: 222.2 LBS | DIASTOLIC BLOOD PRESSURE: 68 MMHG | HEIGHT: 71 IN

## 2021-03-03 DIAGNOSIS — R53.83 FATIGUE, UNSPECIFIED TYPE: ICD-10-CM

## 2021-03-03 DIAGNOSIS — G47.33 OSA (OBSTRUCTIVE SLEEP APNEA): Primary | ICD-10-CM

## 2021-03-03 DIAGNOSIS — I10 ESSENTIAL HYPERTENSION: ICD-10-CM

## 2021-03-03 DIAGNOSIS — R06.83 SNORING: ICD-10-CM

## 2021-03-03 DIAGNOSIS — E66.9 OBESITY: ICD-10-CM

## 2021-03-03 PROCEDURE — 99244 OFF/OP CNSLTJ NEW/EST MOD 40: CPT | Performed by: INTERNAL MEDICINE

## 2021-03-03 PROCEDURE — 3008F BODY MASS INDEX DOCD: CPT | Performed by: INTERNAL MEDICINE

## 2021-03-03 PROCEDURE — 1036F TOBACCO NON-USER: CPT | Performed by: INTERNAL MEDICINE

## 2021-03-03 PROCEDURE — 3074F SYST BP LT 130 MM HG: CPT | Performed by: INTERNAL MEDICINE

## 2021-03-03 PROCEDURE — 3078F DIAST BP <80 MM HG: CPT | Performed by: INTERNAL MEDICINE

## 2021-03-03 NOTE — PROGRESS NOTES
Review of Systems      Genitourinary none   Cardiology none   Gastrointestinal none   Neurology numbness/tingling of an extremity, forgetfulness, poor concentration or confusion,  and difficulty with memory   Constitutional none   Integumentary none   Psychiatry none   Musculoskeletal none   Pulmonary chest tightness and snoring   ENT none   Endocrine none   Hematological none

## 2021-03-03 NOTE — ASSESSMENT & PLAN NOTE
Patient vitals has been completed and entered. Also reviewed medication and allergies with the patient.    · Patient is at high risk for obstructive sleep apnea in view of his history of obesity with body mass index of 30 9, Mallampati score of 4, excessive daytime sleepiness and chronic fatigue  · I would like to obtain a home sleep study test  · Counseled the patient extensively about the importance of diagnosing and treating WALDO with the consequences of untreated WALDO such as cardiovascular morbidity mortality and neurological outcomes

## 2021-03-03 NOTE — PATIENT INSTRUCTIONS
Sleep Apnea   AMBULATORY CARE:   Sleep apnea  is a condition that causes you to stop breathing for 10 seconds or longer during sleep  Obstructive sleep apnea is the most common kind  The muscles and tissues around your throat relax and block air from passing through  Obesity, use of alcohol or cigarettes, or a family history are common causes  Central sleep apnea means your brain does not send signals to the muscles that control breathing  You do not take a breath even though your airway is open  Common causes include medical conditions such as heart failure, being older than 65, or use of opioids  Common signs and symptoms include the following:   · Snoring loudly, snorting, gasping or choking while you sleep, and waking up suddenly because of these    · A hard time thinking, remembering things, or focusing on your tasks the following day    · Headache or nausea    · Waking up often during the night to urinate    · Feeling sleepy, slow, and tired during the day    Call your local emergency number (911 in the [address] [facility]) if:   · You have chest pain or trouble breathing  Call your doctor if:   · You feel tired or depressed  · You have trouble staying awake during the day  · You have trouble thinking clearly  · You have questions or concerns about your condition or care  How sleep apnea is diagnosed:   · Your healthcare provider will ask about your signs and symptoms, when they began, and how bad they are  He or she may ask about medical conditions you have and what medicines you take  Tell your healthcare provider if you smoke and if anyone in your family has sleep apnea  Your healthcare provider may ask the person who sleeps beside you about your signs  · You may need to wear a device that monitors the oxygen level in your blood while you sleep  You may need to have a sleep study (polysomnography) if you have daytime sleepiness   A sleep study helps show your brain, heart, and respiratory system are working during sleep  Sleep studies may monitor the stages of sleep, oxygen levels, body position, eye movement, and snoring  Treatment  depends on the kind of sleep apnea you have:  · A machine  may be used to help you get more air during sleep  A mask may be placed over your nose and mouth, or just your nose  The mask is hooked to the machine  You will get air through the mask  ? A continuous positive airway pressure (CPAP) machine  is used to keep your airway open during sleep  The machine blows a gentle stream of air into the mask when you breathe  This helps keep your airway open so you can breathe more regularly  Extra oxygen may be given through the machine  ? A bilevel positive airway pressure (BiPAP) machine  gives air but lowers the pressure when you breathe out  ? An adaptive servo-ventilator  is a machine that only gives air when it senses you are not breathing  · A mouth device  that looks like a mouth guard stops your tongue and other tissues from blocking airflow  · Surgery  may be needed to remove extra tissues that block your mouth, throat, or nose  Manage or prevent sleep apnea:   · Do not smoke  Nicotine and other chemicals in cigarettes and cigars can cause lung damage  Ask your healthcare provider for information if you currently smoke and need help to quit  E-cigarettes or smokeless tobacco still contain nicotine  Talk to your healthcare provider before you use these products  · Do not drink alcohol or take sedative medicine before you go to sleep  Alcohol and sedatives can relax the muscles and tissues around your throat  This can block the airflow to your lungs  · Maintain a healthy weight  Your healthcare provider can tell you what weight is healthy for you  He or she can help you create a weight loss plan, if needed  The plan will include healthy foods and regular exercise to help you reach your healthy weight   Exercise can also help you sleep and may reduce stress  · Sleep on your side or use pillows designed to prevent sleep apnea  This prevents your tongue or other tissues from blocking your throat  You can also raise the head of your bed  Follow up with your doctor as directed: You may need to have blood tests during your follow-up visits  You will need to work with your healthcare provider to find the right breathing support equipment and settings for you  Write down your questions so you remember to ask them during your visits  © Copyright [address] Information is for End User's use only and may not be sold, redistributed or otherwise used for commercial purposes  All illustrations and images included in CareNotes® are the copyrighted property of A D A M , Inc  or [employer]   The above information is an  only  It is not intended as medical advice for individual conditions or treatments  Talk to your doctor, nurse or pharmacist before following any medical regimen to see if it is safe and effective for you  Sleep Study   AMBULATORY CARE:   What you need to know about a sleep study:  A sleep study is a test to learn if you have a sleep-related breathing disorder (SRBD)  A common example is obstructive sleep apnea  You may need a sleep study if you wake up often during the night, sleepwalk, or have night terrors  Seizures in your sleep, a movement or nerve disorder, or narcolepsy are also reasons for the test  A sleep study may also be called a polysomnography (PSG)  How to prepare for a sleep study:  Healthcare providers will put electrodes on you  These are sticky pads connected to wires  They record snoring, heartbeat, oxygen levels, and body movements while you are asleep  A sleep study may also be done at your home  Healthcare providers will tell you how to do a home sleep study  You may need to go to a sleep center before your home test to have electrodes put on   You will be given a portable monitor to take home for this test   What will happen during the sleep study: Your sleep study may be done as a single night or split night test  For a single night test, your sleep will be monitored by healthcare providers all night  Split night testing is done if you stop breathing at times during the first few hours of the test  Your healthcare provider may have you use a breathing machine called a CPAP for the rest of your test  CPAP is continuous positive airway pressure  The CPAP machine blows a gentle stream of air into a mask placed over your nose and mouth  The split night test with CPAP may help your healthcare provider know if this treatment will help your SRBD  Contact your healthcare provider if:   · You have questions or concerns about your test results, condition, or care  Follow up with your healthcare provider as directed: You will need to return to get the results of your PSG  You may need more PSG tests to see if treatment for your SRBD is working  Write down your questions so you remember to ask them during your visits  © Copyright [address] Information is for End User's use only and may not be sold, redistributed or otherwise used for commercial purposes  All illustrations and images included in CareNotes® are the copyrighted property of [employer]  or [address] [place], [place] [address]mart[address]   The above information is an  only  It is not intended as medical advice for individual conditions or treatments  Talk to your doctor, nurse or pharmacist before following any medical regimen to see if it is safe and effective for you

## 2021-03-03 NOTE — PROGRESS NOTES
Sleep Consultation   Robin Torres 57 y o  male MRN: [id number]      Reason for consultation: WALDO      Assessment/Plan  1  WALDO (obstructive sleep apnea)  Assessment & Plan:  · Patient is at high risk for obstructive sleep apnea in view of his history of obesity with body mass index of 30 9, Mallampati score of 4, excessive daytime sleepiness and chronic fatigue  · I would like to obtain a home sleep study test  · Counseled the patient extensively about the importance of diagnosing and treating WALDO with the consequences of untreated WALDO such as cardiovascular morbidity mortality and neurological outcomes    Orders:  -     Home Study; Future    2  Fatigue, unspecified type  Assessment & Plan:  Rule out underlying WALDO    Orders:  -     Ambulatory referral to Sleep Medicine  -     Home Study; Future    3  Snoring  Assessment & Plan:  Rule out underlying WALDO    Orders:  -     Ambulatory referral to Sleep Medicine  -     Home Study; Future    4  Obesity  Assessment & Plan:  · Lifestyle modifications   · rule out underlying WALDO      5  Essential hypertension  Assessment & Plan:  Untreated WALDO is risk for uncontrolled hypertension            History of Present Illness   HPI:  Robin Torres is a 57 y o  male with PMHx as below who comes in for evaluation of possible underlying WALDO  Patient had recent history of COVID-19 infection after which he has been having more fatigue and feeling tired all the time referred to his primary care physician to rule out underlying WALDO the patient drives to work every day never fallen asleep while driving  He goes to bed around 9:30 p m  Falls asleep in 20 minutes wakes up at 6:00 a m  He has 1-2 times awakeningsnight for bathroom or dry throat  He feels sleepy during the day usually on 2-3 p m  But he tries to avoid taking naps  He sometimes takes melatonin once in a while to help fall asleep  He was witnessed to snore loudly and gasps and stops breathing while sleeping    He has frequent heartburn during sleep and decreased concentration  He drinks 20 oz of coffee per day and 20 oz of tea throughout the day  He denies smoking, drinks occasional alcohol and denies drugs  His B[place] scale is 6/24 today        Review of Systems      Genitourinary none   Cardiology none   Gastrointestinal none   Neurology numbness/tingling of an extremity, forgetfulness, poor concentration or confusion,  and difficulty with memory   Constitutional none   Integumentary none   Psychiatry none   Musculoskeletal none   Pulmonary chest tightness and snoring   ENT none   Endocrine none   Hematological none         Historical Information   Past Medical History:   Diagnosis Date    Hypogonadotropic hypogonadism ([facility] [address] )      Past Surgical History:   Procedure Laterality Date    TONSILLECTOMY       Family History   Problem Relation Age of Onset    Hypertension Mother     Kidney failure Mother     Hypertension Father     Hypertension Brother     Heart attack Brother     Heart attack Brother         39     Social History     Socioeconomic History    Marital status: /Civil Union     Spouse name: Not on file    Number of children: Not on file    Years of education: Not on file    Highest education level: Not on file   Occupational History    Not on file   Social Needs    Financial resource strain: Not on file    Food insecurity     Worry: Not on file     Inability: Not on file    Transportation needs     Medical: Not on file     Non-medical: Not on file   Tobacco Use    Smoking status: Never Smoker    Smokeless tobacco: Former User     Types: Chew    Tobacco comment: former chewer pt stopped ? yrs ago   Substance and Sexual Activity    Alcohol use: Yes     Frequency: 2-3 times a week     Comment: weekly; social as per allscripts    Drug use: No    Sexual activity: Yes     Partners: Female   Lifestyle    Physical activity     Days per week: Not on file     Minutes per session: Not on file    Stress: Not on file   Relationships    Social connections     Talks on phone: Not on file     Gets together: Not on file     Attends Zoroastrianism service: Not on file     Active member of club or organization: Not on file     Attends meetings of clubs or organizations: Not on file     Relationship status: Not on file    Intimate partner violence     Fear of current or ex partner: Not on file     Emotionally abused: Not on file     Physically abused: Not on file     Forced sexual activity: Not on file   Other Topics Concern    Not on file   Social History Narrative    Caffeine - 1-2 cups of coffee per day    Full Time -          Meds/Allergies   Allergies   Allergen Reactions    Moxifloxacin      Causes low BP       Home medications:  Prior to Admission medications    Medication Sig Start Date End Date Taking?  Authorizing Provider   b complex vitamins capsule Take by mouth   Yes Historical Provider, MD   Cholecalciferol (VITAMIN D3) 5000 units CAPS Take 1 capsule by mouth daily     Yes Historical Provider, MD   fluticasone (FLONASE) 50 mcg/act nasal spray 2 sprays into each nostril daily 1/22/16  Yes Historical Provider, MD   lisinopril (ZESTRIL) 20 mg tablet Take 1 tablet (20 mg total) by mouth daily 3/1/21 5/30/21 Yes Claudette Rong, DO   methylPREDNISolone 4 MG tablet therapy pack Use as directed on package 2/4/21  Yes Eva Curran DO   metroNIDAZOLE (METROGEL) 1 % gel Apply topically daily 3/24/20  Yes Eva Curran DO   minocycline (MINOCIN) 50 mg capsule Take 50 mg by mouth daily 6/1/20  Yes Historical Provider, MD   Omega-3 Fatty Acids ([address]) 1200 MG CAPS by Does not apply route   Yes Historical Provider, MD   omeprazole (PriLOSEC) 20 mg delayed release capsule Take 1 capsule (20 mg total) by mouth daily 6/18/20  Yes Eva Curran DO   predniSONE 10 mg tablet Take 6 tablets daily for 3 days then 4 tablets daily for 3 days then 2 tablets daily for 3 days then 1 tablet daily for 3 days with food  3/25/20  Yes Tracy Manuela, DO   Psyllium (METAMUCIL FIBER SINGLES PO) Take by mouth   Yes Historical Provider, MD   sildenafil (Viagra) 100 mg tablet Take 1 tablet (100 mg total) by mouth as needed for erectile dysfunction 6/18/20  Yes Tracy Lather, DO   simvastatin (ZOCOR) 40 mg tablet Take 1 tablet (40 mg total) by mouth daily 6/18/20  Yes Tracy Manuela, DO   cyclobenzaprine (FLEXERIL) 10 mg tablet Take 1 tablet (10 mg total) by mouth daily at bedtime for 14 days 8/17/20 8/31/20  Darletta Pac, CRNP   meloxicam (MOBIC) 15 mg tablet Take 1 tablet (15 mg total) by mouth daily for 14 days 8/17/20 8/31/20  Darletta Pac, CRNP       Vitals:   Blood pressure 114/68, height 5' 11" (1 803 m), weight 101 kg (222 lb 3 2 oz)  ,  Body mass index is 30 99 kg/m²  Neck Circumference: 15 5    Physical Exam  General:  Awake alert and oriented x 3, conversant without conversational dyspnea, NAD, normal affect  HEENT:  PERRL, Sclera noninjected, nonicteric OU, Nares patent,  no craniofacial abnormalities, Mucous membranes, moist, no oral lesions, normal dentition, Mallampati class 4  NECK:  Trachea midline, no accessory muscle use, no stridor, no cervical or supraclavicular adenopathy, JVP not elevated  CARDIAC: Reg, single s1/S2, no m/r/g  PULM: CTA bilaterally no wheezing, rhonchi or rales  ABD: Normoactive bowel sounds, soft nontender, nondistended, no rebound, no rigidity, no guarding  EXT: No cyanosis, no clubbing, no edema, normal capillary refill  NEURO: no focal neurologic deficits, AAOx3, moving all extremities appropriately    Labs: I have personally reviewed pertinent lab results  , ABG: No results found for: PHART, AXQ3EST, PO2ART, FEF6OSE, [id number], BEART, SOURCE, BNP: No results found for: BNP, CBC: No results found for: WBC, HGB, HCT, MCV, PLT, ADJUSTEDWBC, MCH, MCHC, RDW, MPV, NRBC, CMP: No results found for: SODIUM, K, CL, CO2, ANIONGAP, BUN, CREATININE, GLUCOSE, CALCIUM, AST, ALT, ALKPHOS, PROT, BILITOT, EGFR, PT/INR: No results found for: PT, INR, Troponin: No results found for: TROPONINI  Lab Results   Component Value Date    WBC 11 89 (H) 02/08/2021    HGB 15 2 02/08/2021    HCT 46 9 02/08/2021    MCV 91 02/08/2021     (H) 02/08/2021      Lab Results   Component Value Date    GLUCOSE 98 11/13/2015    CALCIUM 10 0 02/08/2021     11/13/2015    K 4 5 02/08/2021    CO2 26 02/08/2021     02/08/2021    BUN 26 (H) 02/08/2021    CREATININE 1 05 02/08/2021     Lab Results   Component Value Date    FERRITIN 54 0 12/11/2015     No results found for: [id number]  No results found for: FOLATE      TTE 1/2021  SUMMARY     LEFT VENTRICLE:  Systolic function was normal  Ejection fraction was estimated to be 55 %  There were no regional wall motion abnormalities  Doppler parameters were consistent with abnormal left ventricular relaxation (grade 1 diastolic dysfunction)      SUMMARY MEASUREMENTS  PW measurements:  Unspecified Anatomy:   E' Sept was 0 1 m/s  E/E' Sept was 7 2   MV E/A Ratio was 0 7   MV PHT was 65 2 ms  MVA By PHT was 3 4 cm2      HISTORY: PRIOR HISTORY: Hypertension  Hyperlipidemia  Covid- 19      PROCEDURE: The procedure was performed in the echo lab  This was a routine study  The transthoracic approach was used  The study included complete 2D imaging, M-mode, complete spectral Doppler, and color Doppler  The heart rate was 87 bpm,  at the start of the study  Images were obtained from the parasternal, apical, subcostal, and suprasternal notch acoustic windows  Echocardiographic views were limited due to lung interference  Image quality was adequate      LEFT VENTRICLE: Size was normal  Systolic function was normal  Ejection fraction was estimated to be 55 %  There were no regional wall motion abnormalities   Wall thickness was normal  DOPPLER: Doppler parameters were consistent with  abnormal left ventricular relaxation (grade 1 diastolic dysfunction)      RIGHT VENTRICLE: The size was normal  Systolic function was normal  Wall thickness was normal      LEFT ATRIUM: Size was normal      RIGHT ATRIUM: Size was normal        I personally reviewed images for chest x-ray from 01/2021  With overall normal cardiopulmonary silhouette        Natasha Serra MD  [facility]       Portions of the record may have been created with voice recognition software  Occasional wrong word or "sound a like" substitutions may have occurred due to the inherent limitations of voice recognition software  Read the chart carefully and recognize, using context, where substitutions have occurred

## 2021-03-16 ENCOUNTER — HOSPITAL ENCOUNTER (OUTPATIENT)
Dept: NON INVASIVE DIAGNOSTICS | Facility: HOSPITAL | Age: 61
Discharge: HOME | End: 2021-03-16
Attending: INTERNAL MEDICINE
Payer: COMMERCIAL

## 2021-03-16 DIAGNOSIS — R06.02 SHORTNESS OF BREATH: ICD-10-CM

## 2021-03-16 LAB
ARRHY DURING EX: NORMAL
CHEST PAIN STATEMENT: NORMAL
MAX DIASTOLIC BP: 64 MMHG
MAX HEART RATE: 171 BPM
MAX PREDICTED HEART RATE: 158 BPM
MAX. SYSTOLIC BP: 164 MMHG
PROTOCOL NAME: NORMAL
TARGET HR FORMULA: NORMAL
TIME IN EXERCISE PHASE: NORMAL

## 2021-03-16 PROCEDURE — 93017 CV STRESS TEST TRACING ONLY: CPT

## 2021-03-16 PROCEDURE — 93016 CV STRESS TEST SUPVJ ONLY: CPT | Performed by: INTERNAL MEDICINE

## 2021-03-16 PROCEDURE — 93018 CV STRESS TEST I&R ONLY: CPT | Performed by: INTERNAL MEDICINE

## 2021-03-30 DIAGNOSIS — Z23 ENCOUNTER FOR IMMUNIZATION: ICD-10-CM

## 2021-04-05 ENCOUNTER — HOSPITAL ENCOUNTER (OUTPATIENT)
Dept: SLEEP CENTER | Facility: CLINIC | Age: 61
Discharge: HOME | End: 2021-04-05
Payer: COMMERCIAL

## 2021-04-05 DIAGNOSIS — R06.83 SNORING: ICD-10-CM

## 2021-04-05 DIAGNOSIS — R53.83 FATIGUE, UNSPECIFIED TYPE: ICD-10-CM

## 2021-04-05 DIAGNOSIS — G47.33 OSA (OBSTRUCTIVE SLEEP APNEA): ICD-10-CM

## 2021-04-05 PROCEDURE — G0399 HOME SLEEP TEST/TYPE 3 PORTA: HCPCS

## 2021-04-05 PROCEDURE — G0399 HOME SLEEP TEST/TYPE 3 PORTA: HCPCS | Performed by: INTERNAL MEDICINE

## 2021-04-05 NOTE — PROGRESS NOTES
Home Sleep Study Documentation    Pre-Sleep Home Study:    Set-up and instructions performed by: CLARA Summers    Technician performed demonstration for Patient: yes    Return demonstration performed by Patient: yes    Written instructions provided to Patient: yes    Patient signed consent form: yes        Post-Sleep Home Study:    Additional comments by Patient: none    Home Sleep Study Failed:no:    Failure reason: N/A    Reported or Detected: N/A    Scored by: KASSANDRA Schumacher, RPSGT

## 2021-04-06 DIAGNOSIS — G47.33 OSA (OBSTRUCTIVE SLEEP APNEA): Primary | ICD-10-CM

## 2021-04-06 NOTE — PROGRESS NOTES
I called the patient updated him about the results of the sleep study and that he will need auto titrating CPAP he agreed and will be looking forward for [employer] company to provide him with the CPAP machine

## 2021-04-12 ENCOUNTER — OFFICE VISIT (OUTPATIENT)
Dept: CARDIOLOGY CLINIC | Facility: CLINIC | Age: 61
End: 2021-04-12
Payer: COMMERCIAL

## 2021-04-12 VITALS
HEART RATE: 68 BPM | DIASTOLIC BLOOD PRESSURE: 78 MMHG | BODY MASS INDEX: 31.13 KG/M2 | SYSTOLIC BLOOD PRESSURE: 130 MMHG | WEIGHT: 223.2 LBS

## 2021-04-12 DIAGNOSIS — E78.2 MIXED HYPERLIPIDEMIA: ICD-10-CM

## 2021-04-12 DIAGNOSIS — I10 ESSENTIAL HYPERTENSION: ICD-10-CM

## 2021-04-12 DIAGNOSIS — R06.02 SHORTNESS OF BREATH: Primary | ICD-10-CM

## 2021-04-12 DIAGNOSIS — R53.83 FATIGUE, UNSPECIFIED TYPE: ICD-10-CM

## 2021-04-12 PROCEDURE — 1036F TOBACCO NON-USER: CPT | Performed by: INTERNAL MEDICINE

## 2021-04-12 PROCEDURE — 99214 OFFICE O/P EST MOD 30 MIN: CPT | Performed by: INTERNAL MEDICINE

## 2021-04-12 PROCEDURE — 3078F DIAST BP <80 MM HG: CPT | Performed by: INTERNAL MEDICINE

## 2021-04-12 PROCEDURE — 3075F SYST BP GE 130 - 139MM HG: CPT | Performed by: INTERNAL MEDICINE

## 2021-04-12 NOTE — PROGRESS NOTES
Cardiology Office Note  MD Lisa Madden MD Georgi Melia, DO, [facility] MD Oswald Caceres DO, Ron Smiley DO, [facility] - [place]  ----------------------------------------------------------------  [address] [facility]  [address] [facility] [address], [address] [facility]    Soren Galarza 57 y o  male MRN: [id number]  Unit/Bed#:  Encounter: [id number]      History of Present Illness: It was a pleasure to see Soren Galarza in the office today for follow up CV evaluation  He has a past medical history of hypertension, dyslipidemia, reflux disease and COVID-19 infection in December 2020  He has been experiencing constellation of symptoms including shortness of breath with exertion and fatigue since his COVID-19 infection December 2020  He knows that he used to have increased physical activity level and less fatigued but has been unable to get back his baseline level of functioning  It is required him to take multiple leads from work due to the severity of his symptoms  He has undergone workup including an echocardiogram for which he is here to review the results today  Blood pressure has also transiently been elevated on prednisone  He is currently off of prednisone medication  Separately, he intermittently has noted that when he goes from the sitting to standing position he occasionally gets some mild lightheadedness  If he rests in between going from sitting to standing, his symptoms are less significant  Since being off of his hydrochlorothiazide medication, the symptoms have resolved  Additionally on questioning, he  stated that he does snore and was found to have episodes where he is gasping for air in the middle of the night  Since our last encounter, he underwent sleep study which found have mild obstructive sleep apnea  He is being evaluated for CPAP  He also underwent stress testing  He is here today to discuss the results    Denies chest pain, pressure, tightness or squeezing  Denies lower extremity swelling, orthopnea or paroxysmal nocturnal dyspnea  Denies loss of consciousness  Review of Systems:  Review of Systems   Constitution: Positive for malaise/fatigue  Negative for decreased appetite, fever, weight gain and weight loss  HENT: Negative for congestion and sore throat  Eyes: Negative for visual disturbance  Cardiovascular: Positive for dyspnea on exertion  Negative for chest pain, leg swelling, near-syncope and palpitations  Respiratory: Negative for cough and shortness of breath  Hematologic/Lymphatic: Negative for bleeding problem  Skin: Negative for rash  Musculoskeletal: Negative for myalgias and neck pain  Gastrointestinal: Negative for abdominal pain and nausea  Neurological: Negative for light-headedness and weakness  Psychiatric/Behavioral: Negative for depression         Past Medical History:   Diagnosis Date    Hypogonadotropic hypogonadism [facility])        Past Surgical History:   Procedure Laterality Date    TONSILLECTOMY         Social History     Socioeconomic History    Marital status: /Civil Union     Spouse name: Not on file    Number of children: Not on file    Years of education: Not on file    Highest education level: Not on file   Occupational History    Not on file   Social Needs    Financial resource strain: Not on file    Food insecurity     Worry: Not on file     Inability: Not on file   [employer] needs     Medical: Not on file     Non-medical: Not on file   Tobacco Use    Smoking status: Never Smoker    Smokeless tobacco: Former User     Types: Chew    Tobacco comment: former chewer pt stopped ? yrs ago   Substance and Sexual Activity    Alcohol use: Yes     Frequency: 2-3 times a week     Comment: weekly; social as per allscripts    Drug use: No    Sexual activity: Yes     Partners: Female   Lifestyle    Physical activity     Days per week: Not on file     Minutes per session: Not on file    Stress: Not on file   Relationships    Social connections     Talks on phone: Not on file     Gets together: Not on file     Attends Faith service: Not on file     Active member of club or organization: Not on file     Attends meetings of clubs or organizations: Not on file     Relationship status: Not on file    Intimate partner violence     Fear of current or ex partner: Not on file     Emotionally abused: Not on file     Physically abused: Not on file     Forced sexual activity: Not on file   Other Topics Concern    Not on file   Social History Narrative    Caffeine - 1-2 cups of coffee per day    Full Time -        Family History   Problem Relation Age of Onset    Hypertension Mother     Kidney failure Mother     Hypertension Father     Hypertension Brother     Heart attack Brother     Heart attack Brother         45       Allergies   Allergen Reactions    Moxifloxacin      Causes low BP         Current Outpatient Medications:     b complex vitamins capsule, Take by mouth, Disp: , Rfl:     Cholecalciferol (VITAMIN D3) 5000 units CAPS, Take 1 capsule by mouth daily  , Disp: , Rfl:     cyclobenzaprine (FLEXERIL) 10 mg tablet, Take 1 tablet (10 mg total) by mouth daily at bedtime for 14 days, Disp: 14 tablet, Rfl: 0    fluticasone (FLONASE) 50 mcg/act nasal spray, 2 sprays into each nostril daily, Disp: , Rfl:     lisinopril (ZESTRIL) 20 mg tablet, Take 1 tablet (20 mg total) by mouth daily, Disp: 90 tablet, Rfl: 1    meloxicam (MOBIC) 15 mg tablet, Take 1 tablet (15 mg total) by mouth daily for 14 days, Disp: 14 tablet, Rfl: 0    methylPREDNISolone 4 MG tablet therapy pack, Use as directed on package, Disp: 21 each, Rfl: 0    metroNIDAZOLE (METROGEL) 1 % gel, Apply topically daily, Disp: 45 g, Rfl: 0    minocycline (MINOCIN) 50 mg capsule, Take 50 mg by mouth daily, Disp: , Rfl:     Omega-3 Fatty Acids (FISH OIL) 1200 MG CAPS, by Does not apply route, Disp: , Rfl:    omeprazole (PriLOSEC) 20 mg delayed release capsule, Take 1 capsule (20 mg total) by mouth daily, Disp: 90 capsule, Rfl: 3    predniSONE 10 mg tablet, Take 6 tablets daily for 3 days then 4 tablets daily for 3 days then 2 tablets daily for 3 days then 1 tablet daily for 3 days with food  , Disp: 39 tablet, Rfl: 0    Psyllium (METAMUCIL FIBER SINGLES PO), Take by mouth, Disp: , Rfl:     sildenafil (Viagra) 100 mg tablet, Take 1 tablet (100 mg total) by mouth as needed for erectile dysfunction, Disp: 18 tablet, Rfl: 5    simvastatin (ZOCOR) 40 mg tablet, Take 1 tablet (40 mg total) by mouth daily, Disp: 90 tablet, Rfl: 3    There were no vitals filed for this visit  PHYSICAL EXAMINATION:  Gen: Awake, Alert, NAD    HEENT: AT/NC, Anicteric, mmm  Neck: Supple, No elevated JVP  Resp: CTA bilaterally no w/r/r  CV: RRR +S1, S2, No m/r/g  Abd: Soft, Obese, NT/ND + BS  Ext: warm, no LE edema bilaterally  Neuro: Follows commands, moves all extermities  Psych: Appropriate affect    --------------------------------------------------------------------------------  TREADMILL STRESS  No results found for this or any previous visit    --------------------------------------------------------------------------------  NUCLEAR STRESS TEST: No results found for this or any previous visit  No results found for this or any previous visit     --------------------------------------------------------------------------------  CATH:  No results found for this or any previous visit   --------------------------------------------------------------------------------  ECHO:   Results for orders placed during the hospital encounter of 01/20/21   Echo complete with contrast if indicated    Narrative [address] [place] [address], [address] [place] [address]  [phone]    Transthoracic Echocardiogram  2D, M-mode, Doppler, and Color Doppler    Study date:  20-Jan-2021    Patient: Rohit Farmer  MR number: [id number]  Account number: [de-identified]  : 1960  Age: 63 years  Gender: Male  Status: Outpatient  Location: Echo lab  Height: 71 in  Weight: 218 5 lb  BP: 110/ 70 mmHg    Indications: Shortness of breath  Indications reviewed by performing physician  Diagnoses: R06 02 - Shortness of breath    Sonographer:  Lilian Vidal RDCS  Referring Physician:  Britton Melgar DO  Group:  [facility]  Interpreting Physician:  Ling Waddell DO    SUMMARY    LEFT VENTRICLE:  Systolic function was normal  Ejection fraction was estimated to be 55 %  There were no regional wall motion abnormalities  Doppler parameters were consistent with abnormal left ventricular relaxation (grade 1 diastolic dysfunction)  SUMMARY MEASUREMENTS  PW measurements:  Unspecified Anatomy:   E' Sept was 0 1 m/s  E/E' Sept was 7 2   MV E/A Ratio was 0 7   MV PHT was 65 2 ms  MVA By PHT was 3 4 cm2  HISTORY: PRIOR HISTORY: Hypertension  Hyperlipidemia  Covid- 19  PROCEDURE: The procedure was performed in the echo lab  This was a routine study  The transthoracic approach was used  The study included complete 2D imaging, M-mode, complete spectral Doppler, and color Doppler  The heart rate was 87 bpm,  at the start of the study  Images were obtained from the parasternal, apical, subcostal, and suprasternal notch acoustic windows  Echocardiographic views were limited due to lung interference  Image quality was adequate  LEFT VENTRICLE: Size was normal  Systolic function was normal  Ejection fraction was estimated to be 55 %  There were no regional wall motion abnormalities  Wall thickness was normal  DOPPLER: Doppler parameters were consistent with  abnormal left ventricular relaxation (grade 1 diastolic dysfunction)      RIGHT VENTRICLE: The size was normal  Systolic function was normal  Wall thickness was normal     LEFT ATRIUM: Size was normal     RIGHT ATRIUM: Size was normal     MITRAL VALVE: Valve structure was normal  There was normal leaflet separation  DOPPLER: The transmitral velocity was within the normal range  There was no evidence for stenosis  There was no regurgitation  AORTIC VALVE: The valve was trileaflet  Leaflets exhibited mildly increased thickness, normal cuspal separation, and sclerosis  DOPPLER: Transaortic velocity was within the normal range  There was no evidence for stenosis  There was no  regurgitation  TRICUSPID VALVE: The valve structure was normal  There was normal leaflet separation  DOPPLER: The transtricuspid velocity was within the normal range  There was no evidence for stenosis  There was no regurgitation  PULMONIC VALVE: Not well visualized  DOPPLER: The transpulmonic velocity was within the normal range  There was no evidence for stenosis  There was no regurgitation  PERICARDIUM: There was no pericardial effusion  The pericardium was normal in appearance  AORTA: The root exhibited normal size  SYSTEMIC VEINS: IVC: The inferior vena cava was not well visualized  PULMONARY ARTERY: DOPPLER: The tricuspid jet envelope definition was inadequate for estimation of RV systolic pressure  There are no indirect findings (abnormal RV volume or geometry, altered pulmonary flow velocity profile, or leftward  septal displacement) which would suggest moderate or severe pulmonary hypertension      SYSTEM MEASUREMENT TABLES    2D  %FS: 35 7 %  Ao Diam: 3 4 cm  EDV(Teich): 73 ml  EF(Teich): 65 7 %  ESV(Teich): 25 1 ml  IVSd: 1 cm  LA Area: 11 5 cm2  LA Diam: 3 7 cm  LVEDV MOD A4C: 86 7 ml  LVEF MOD A4C: 51 9 %  LVESV MOD A4C: 41 7 ml  LVIDd: 4 1 cm  LVIDs: 2 6 cm  LVLd A4C: 8 1 cm  LVLs A4C: 6 5 cm  LVPWd: 1 1 cm  RA Area: 11 6 cm2  RVIDd: 3 cm  SV MOD A4C: 45 ml  SV(Teich): 47 9 ml    MM  TAPSE: 2 cm    PW  E' Sept: 0 1 m/s  E/E' Sept: 7 2  MV A Aristeo: 0 9 m/s  MV Dec Campbell: 2 9 m/s2  MV DecT: 224 8 ms  MV E Aristeo: 0 7 m/s  MV E/A Ratio: 0 7  MV PHT: 65 2 ms  MVA By PHT: 3 4 cm2    [address] Accredited [facility]    Prepared and electronically signed by    Enid Felder DO  Signed 20-Jan-2021 14:33:52       No results found for this or any previous visit   --------------------------------------------------------------------------------  HOLTER  No results found for this or any previous visit  No results found for this or any previous visit   --------------------------------------------------------------------------------  CAROTIDS  No results found for this or any previous visit    --------------------------------------------------------------------------------  ECGs:  No results found for this visit on 04/12/21  Lab Results   Component Value Date    WBC 11 89 (H) 02/08/2021    HGB 15 2 02/08/2021    HCT 46 9 02/08/2021    MCV 91 02/08/2021     (H) 02/08/2021      Lab Results   Component Value Date    SODIUM 135 (L) 02/08/2021    K 4 5 02/08/2021     02/08/2021    CO2 26 02/08/2021    BUN 26 (H) 02/08/2021    CREATININE 1 05 02/08/2021    GLUC 114 07/16/2018    CALCIUM 10 0 02/08/2021      Lab Results   Component Value Date    HGBA1C 5 3 07/17/2018      Lab Results   Component Value Date    CHOL 151 11/13/2015    CHOL 184 11/12/2014     Lab Results   Component Value Date    HDL 64 02/08/2021    HDL 56 12/19/2019    HDL 56 12/18/2018     Lab Results   Component Value Date    LDLCALC 94 02/08/2021    LDLCALC 89 12/19/2019    LDLCALC 83 12/18/2018     Lab Results   Component Value Date    TRIG 169 (H) 02/08/2021    TRIG 121 12/19/2019    TRIG 147 12/18/2018     No results found for: CHOLHDL   No results found for: INR, PROTIME     1  Shortness of breath    2  Fatigue, unspecified type    3  Essential hypertension    4   Mixed hyperlipidemia        IMPRESSION:  · Dyspnea on exertion  · Fatigue  · Hypertension  · Dyslipidemia with LDL 94 mg/dL,  mg/dL, February 2021  · LVEF 55%, grade 1 diastolic dysfunction, January 2021  · Exercise stress test negative for myocardial ischemia, ET 9 min, 108% MPHR, 10 1 METs, March 2021  · Fatty Liver disease  · History of COVID 19 infection, December 2020  · Mild WALDO  · GERD    PLAN:  It was a pleasure to see Alejandra Landry in the office today for follow up CV evaluation  Exercise stress test was found to be negative for myocardial ischemia  He has had good exercise tolerance and functional capacity, running for 9 minutes achieving a workload 10 1 METs  Echocardiogram showed that his cardiac structure and function is grossly normal   Blood pressure and heart rate are currently stable on the lisinopril  Despite this, he continues to feel fatigued and short of breath  I believe that these are long-lasting effects from a combination of deconditioning and COVID-19 infection in December 2020  He has no symptoms concerning for angina and examines to be euvolemic in the office today  Based on his clinical presentation, I have the following recommendations:    1  Recommend continued lifestyle and risk factor modification including blood pressure control, watching his lipid panel and activity  2  Encouraged follow-up with sleep medicine regarding stay his WALDO  3  Continue simvastatin  LDL is to goal   Goal is less than 100 mg/dL  4  Should his systolic blood pressure sustained greater than 443 or diastolic pressure sustained greater than 90, I have encouraged him to call the office so that we may increase his lisinopril further  5  As always, I have recommend a heart healthy diet low in sodium and exercise regimen  6  We will follow up with him in 12 months  As always, please do not hesitate to call with any questions  Portions of the record may have been created with voice recognition software  Occasional wrong word or "sound a like" substitutions may have occurred due to the inherent limitations of voice recognition software   Read the chart carefully and recognize, using context, where substitutions have occurred          Signed: Shelton Jasso DO, Lanelle Simpers

## 2021-04-13 ENCOUNTER — TELEPHONE (OUTPATIENT)
Dept: SLEEP CENTER | Facility: CLINIC | Age: 61
End: 2021-04-13

## 2021-04-13 NOTE — TELEPHONE ENCOUNTER
Received call from patient who states [facility] does not participate with his insurance  He would like to use [facility] for his APAP  Cancelled DME set up appointment with [facility] on 4/27/21  Rx for APAP, office note and sleep study faxed to [facility]       Patient has compliance follow up with Dr Steven Guerrero on 6/2/21

## 2021-04-13 NOTE — TELEPHONE ENCOUNTER
Left message for patient to call office  Dr Rose Mary Krishnan reviewed results  Need to verify what DME company he would like to use      Patient does have follow up scheduled 6/2/2021

## 2021-04-14 ENCOUNTER — IMMUNIZATIONS (OUTPATIENT)
Dept: FAMILY MEDICINE CLINIC | Facility: HOSPITAL | Age: 61
End: 2021-04-14

## 2021-04-14 DIAGNOSIS — Z23 ENCOUNTER FOR IMMUNIZATION: Primary | ICD-10-CM

## 2021-04-14 PROCEDURE — 0011A SARS-COV-2 / COVID-19 MRNA VACCINE (MODERNA) 100 MCG: CPT

## 2021-04-14 PROCEDURE — 91301 SARS-COV-2 / COVID-19 MRNA VACCINE (MODERNA) 100 MCG: CPT

## 2021-05-17 ENCOUNTER — IMMUNIZATIONS (OUTPATIENT)
Dept: FAMILY MEDICINE CLINIC | Facility: HOSPITAL | Age: 61
End: 2021-05-17

## 2021-05-17 DIAGNOSIS — Z23 ENCOUNTER FOR IMMUNIZATION: Primary | ICD-10-CM

## 2021-05-17 PROCEDURE — 91301 SARS-COV-2 / COVID-19 MRNA VACCINE (MODERNA) 100 MCG: CPT

## 2021-05-17 PROCEDURE — 0012A SARS-COV-2 / COVID-19 MRNA VACCINE (MODERNA) 100 MCG: CPT

## 2021-07-20 ENCOUNTER — OFFICE VISIT (OUTPATIENT)
Dept: FAMILY MEDICINE CLINIC | Facility: CLINIC | Age: 61
End: 2021-07-20
Payer: COMMERCIAL

## 2021-07-20 VITALS
WEIGHT: 223.4 LBS | BODY MASS INDEX: 31.27 KG/M2 | RESPIRATION RATE: 16 BRPM | HEIGHT: 71 IN | SYSTOLIC BLOOD PRESSURE: 118 MMHG | HEART RATE: 84 BPM | TEMPERATURE: 97.2 F | DIASTOLIC BLOOD PRESSURE: 80 MMHG | OXYGEN SATURATION: 98 %

## 2021-07-20 DIAGNOSIS — J34.2 DEVIATED SEPTUM: ICD-10-CM

## 2021-07-20 DIAGNOSIS — M54.2 NECK PAIN: ICD-10-CM

## 2021-07-20 DIAGNOSIS — Z00.00 ANNUAL PHYSICAL EXAM: Primary | ICD-10-CM

## 2021-07-20 DIAGNOSIS — J30.2 SEASONAL ALLERGIC RHINITIS, UNSPECIFIED TRIGGER: ICD-10-CM

## 2021-07-20 DIAGNOSIS — M25.562 ACUTE PAIN OF LEFT KNEE: ICD-10-CM

## 2021-07-20 PROCEDURE — 3079F DIAST BP 80-89 MM HG: CPT | Performed by: FAMILY MEDICINE

## 2021-07-20 PROCEDURE — 3074F SYST BP LT 130 MM HG: CPT | Performed by: FAMILY MEDICINE

## 2021-07-20 PROCEDURE — 1036F TOBACCO NON-USER: CPT | Performed by: FAMILY MEDICINE

## 2021-07-20 PROCEDURE — 3008F BODY MASS INDEX DOCD: CPT | Performed by: FAMILY MEDICINE

## 2021-07-20 PROCEDURE — 3725F SCREEN DEPRESSION PERFORMED: CPT | Performed by: FAMILY MEDICINE

## 2021-07-20 PROCEDURE — 99396 PREV VISIT EST AGE 40-64: CPT | Performed by: FAMILY MEDICINE

## 2021-07-20 RX ORDER — MELOXICAM 15 MG/1
15 TABLET ORAL DAILY
Qty: 30 TABLET | Refills: 0 | Status: SHIPPED | OUTPATIENT
Start: 2021-07-20 | End: 2022-01-26

## 2021-07-20 NOTE — PROGRESS NOTES
Assessment/Plan:    Form completed for annual preventative wellness exam   Patient to continue present treatment  Patient will try meloxicam 15 mg 1 daily with food and instructed to avoid ibuprofen and naproxen  Patient is being sent for x-ray left knee  Patient may continue knee brace and ice p r n     Discussed referral to Orthopedics  Patient is being referred to Dr Seven Daniels, ENT  Discussed referral to [id number] program and patient will consider it  Return the office in 6 months for follow-up appointment and fasting labs  Diagnoses and all orders for this visit:    Annual physical exam    Acute pain of left knee  -     XR knee 4+ vw left injury; Future    Deviated septum  -     Ambulatory Referral to Otolaryngology; Future    Seasonal allergic rhinitis, unspecified trigger  -     Ambulatory Referral to Otolaryngology; Future    Neck pain  -     meloxicam (MOBIC) 15 mg tablet; Take 1 tablet (15 mg total) by mouth daily for 14 days    Other orders  -     Cancel: Ambulatory referral for colonoscopy          Subjective:      Patient ID: Mejia Del Valle is a 57 y o  male  Patient is here for his annual wellness preventative physical exam for his health insurance plan and he is not fasting today  Reviewed fasting labs from several months ago  Patient prefers to hold off on rechecking CBC until next visit  Patient is overdue for follow-up colonoscopy and declines at this time  He also declines Cologuard testing at this time although agrees to colonoscopy next year  Patient has been feeling fairly well overall and is back to working full-time and back to working out exercising and weight training although admits to some long hauler symptoms from his COVID-19 infection including brain fog and difficulty with concentration  Patient did receive COVID vaccine x2  Patient uses CPAP nightly and admits to difficulty breathing through the left side of his nose    Patient complains of left knee pain for the past 2 weeks  He denies any specific injury or fall  He denies swelling, locking or giving out  He has treated this with ice, knee brace and aleve with some relief  Knee Pain   The incident occurred more than 1 week ago  There was no injury mechanism  The pain is present in the left knee  The quality of the pain is described as stabbing and aching  The pain has been constant since onset  Pertinent negatives include no inability to bear weight, loss of motion, muscle weakness, numbness or tingling  The symptoms are aggravated by weight bearing and movement  He has tried ice and NSAIDs for the symptoms  The treatment provided mild relief  The following portions of the patient's history were reviewed and updated as appropriate: allergies, current medications, past family history, past medical history, past social history, past surgical history and problem list     Review of Systems   Constitutional: Positive for fatigue  Negative for activity change, appetite change, chills, diaphoresis, fever and unexpected weight change  HENT: Positive for congestion  Negative for ear pain, hearing loss, nosebleeds, postnasal drip, rhinorrhea, sinus pressure, sinus pain, sneezing and sore throat  Eyes: Negative  Respiratory: Negative for cough, chest tightness, shortness of breath and wheezing  Cardiovascular: Negative for chest pain, palpitations and leg swelling  Gastrointestinal: Negative for abdominal pain, blood in stool, constipation, diarrhea, nausea and vomiting  Endocrine: Negative for cold intolerance and heat intolerance  Genitourinary: Negative for difficulty urinating, dysuria, frequency and hematuria  Musculoskeletal: Positive for arthralgias  Negative for back pain, gait problem, joint swelling, myalgias, neck pain and neck stiffness  Skin: Negative  Neurological: Negative for dizziness, tingling, syncope, weakness, light-headedness, numbness and headaches  Hematological: Negative for adenopathy  Does not bruise/bleed easily  Psychiatric/Behavioral: Positive for decreased concentration  Negative for confusion, dysphoric mood and sleep disturbance  The patient is not nervous/anxious  Objective:      /80   Pulse 84   Temp (!) 97 2 °F (36 2 °C) (Temporal)   Resp 16   Ht 5' 11" (1 803 m)   Wt 101 kg (223 lb 6 4 oz)   SpO2 98%   BMI 31 16 kg/m²          Physical Exam  Constitutional:       General: He is not in acute distress  Appearance: Normal appearance  HENT:      Head: Normocephalic  Right Ear: Tympanic membrane normal       Left Ear: Tympanic membrane normal       Nose: Congestion present  Comments: Septum deviates to the left     Mouth/Throat:      Mouth: Mucous membranes are moist       Pharynx: Oropharynx is clear  Eyes:      General: No scleral icterus  Conjunctiva/sclera: Conjunctivae normal    Neck:      Vascular: No carotid bruit  Cardiovascular:      Rate and Rhythm: Normal rate and regular rhythm  Pulmonary:      Effort: Pulmonary effort is normal       Breath sounds: Normal breath sounds  Abdominal:      Palpations: Abdomen is soft  Tenderness: There is no abdominal tenderness  Musculoskeletal:         General: Tenderness present  Cervical back: Neck supple  Right lower leg: No edema  Left lower leg: No edema  Comments: Left knee range of motion intact and negative effusion  Positive medial joint line tenderness  Negative popliteal tenderness  Ligaments appear intact  Lymphadenopathy:      Cervical: No cervical adenopathy  Neurological:      General: No focal deficit present  Mental Status: He is alert and oriented to person, place, and time  Psychiatric:         Mood and Affect: Mood normal          Behavior: Behavior normal          Thought Content: Thought content normal          Judgment: Judgment normal          BMI Counseling: Body mass index is 31 16 kg/m²  The BMI is above normal  Nutrition recommendations include reducing portion sizes, decreasing overall calorie intake, 3-5 servings of fruits/vegetables daily, reducing fast food intake, consuming healthier snacks, decreasing soda and/or juice intake, moderation in carbohydrate intake, increasing intake of lean protein, reducing intake of saturated fat and trans fat and reducing intake of cholesterol  Exercise recommendations include moderate aerobic physical activity for 150 minutes/week

## 2021-08-12 DIAGNOSIS — I10 ESSENTIAL HYPERTENSION: ICD-10-CM

## 2021-08-12 DIAGNOSIS — E78.5 HYPERLIPIDEMIA, UNSPECIFIED HYPERLIPIDEMIA TYPE: ICD-10-CM

## 2021-08-12 DIAGNOSIS — K21.9 GASTROESOPHAGEAL REFLUX DISEASE WITHOUT ESOPHAGITIS: ICD-10-CM

## 2021-08-12 RX ORDER — OMEPRAZOLE 20 MG/1
20 CAPSULE, DELAYED RELEASE ORAL DAILY
Qty: 90 CAPSULE | Refills: 3 | Status: SHIPPED | OUTPATIENT
Start: 2021-08-12 | End: 2022-07-25

## 2021-08-12 RX ORDER — SIMVASTATIN 40 MG
40 TABLET ORAL DAILY
Qty: 90 TABLET | Refills: 3 | Status: SHIPPED | OUTPATIENT
Start: 2021-08-12 | End: 2022-07-25

## 2021-08-12 RX ORDER — LISINOPRIL 20 MG/1
20 TABLET ORAL DAILY
Qty: 90 TABLET | Refills: 1 | Status: SHIPPED | OUTPATIENT
Start: 2021-08-12 | End: 2022-02-05

## 2021-08-30 ENCOUNTER — OFFICE VISIT (OUTPATIENT)
Dept: SLEEP CENTER | Facility: CLINIC | Age: 61
End: 2021-08-30
Payer: COMMERCIAL

## 2021-08-30 VITALS
HEIGHT: 71 IN | BODY MASS INDEX: 31.22 KG/M2 | HEART RATE: 84 BPM | WEIGHT: 223 LBS | OXYGEN SATURATION: 98 % | DIASTOLIC BLOOD PRESSURE: 70 MMHG | SYSTOLIC BLOOD PRESSURE: 120 MMHG

## 2021-08-30 DIAGNOSIS — E66.9 CLASS 1 OBESITY WITHOUT SERIOUS COMORBIDITY WITH BODY MASS INDEX (BMI) OF 31.0 TO 31.9 IN ADULT, UNSPECIFIED OBESITY TYPE: ICD-10-CM

## 2021-08-30 DIAGNOSIS — R06.83 SNORING: ICD-10-CM

## 2021-08-30 DIAGNOSIS — R53.83 FATIGUE, UNSPECIFIED TYPE: ICD-10-CM

## 2021-08-30 DIAGNOSIS — G47.33 OSA (OBSTRUCTIVE SLEEP APNEA): Primary | ICD-10-CM

## 2021-08-30 DIAGNOSIS — I10 ESSENTIAL HYPERTENSION: ICD-10-CM

## 2021-08-30 DIAGNOSIS — G47.00 INSOMNIA, UNSPECIFIED TYPE: ICD-10-CM

## 2021-08-30 PROCEDURE — 1036F TOBACCO NON-USER: CPT | Performed by: INTERNAL MEDICINE

## 2021-08-30 PROCEDURE — 99215 OFFICE O/P EST HI 40 MIN: CPT | Performed by: INTERNAL MEDICINE

## 2021-08-30 PROCEDURE — 3008F BODY MASS INDEX DOCD: CPT | Performed by: INTERNAL MEDICINE

## 2021-08-30 RX ORDER — DOXEPIN HYDROCHLORIDE 10 MG/1
10 CAPSULE ORAL
Qty: 30 CAPSULE | Refills: 0 | Status: SHIPPED | OUTPATIENT
Start: 2021-08-30

## 2021-08-30 NOTE — PATIENT INSTRUCTIONS
Doxepin (By mouth)   Doxepin (DOX-e-pin)  Treats depression, anxiety, and sleep disorders  This medicine is a TCA  Brand Name(s): Silenor   There may be other brand names for this medicine  When This Medicine Should Not Be Used: This medicine is not right for everyone  Do not use it if you had an allergic reaction to doxepin or similar medicines or if you have glaucoma or problems urinating  How to Use This Medicine:   Capsule, Liquid, Tablet  · Your doctor will tell you how much medicine to use  Do not use more than directed  · Sinequan® oral liquid:   ? Measure the dose with the dropper that comes with the medicine  ? Mix the medicine with 120 mL (4 ounces) of water, milk, or fruit juice (orange, grapefruit, tomato, prune, or pineapple) before you drink it  Do not use grape juice or carbonated beverages (soda pop)  Mix the medicine just before you take your dose  Do not prepare it ahead of time  · Silenor® tablet:   ? Do not take the tablet within 3 hours of a meal  It may not work as well, or it might make you sleepy the next day  ? Take the tablet 30 minutes before you go to bed  ? Do not take the medicine unless you can get a full night's sleep (7 to 8 hours)  · This medicine should come with a Medication Guide  Ask your pharmacist for a copy if you do not have one  · Missed dose: Take a dose as soon as you remember  If it is almost time for your next dose, wait until then and take a regular dose  Do not take extra medicine to make up for a missed dose  · Store the medicine in a closed container at room temperature, away from heat, moisture, and direct light  Drugs and Foods to Avoid:   Ask your doctor or pharmacist before using any other medicine, including over-the-counter medicines, vitamins, and herbal products  · Do not use this medicine if you have used an MAO inhibitor within the past 14 days  · Some foods and medicines can affect how doxepin works   Tell your doctor if you are using any of the following:  ? Cimetidine, tolazamide  ? Other medicines for depression (including citalopram, escitalopram, fluoxetine, paroxetine, sertraline)  ? Medicine for heart rhythm problems (including flecainide, propafenone, quinidine)  ? Phenothiazine medicine (including chlorpromazine, perphenazine, promethazine, prochlorperazine, thioridazine)  · Do not drink alcohol while you are using this medicine  · This medicine can intensify the effects of other medicine that makes you sleepy, including allergy medicine and narcotic pain medicine  Warnings While Using This Medicine:   · Tell your doctor if you are pregnant or planning to become pregnant during treatment with this medicine  This medicine may cause symptoms of sedation (including breathing problems, sluggishness, low muscle tone, feeding problems, or withdrawal symptoms) in the baby during the third trimester  · Do not breastfeed during treatment with this medicine  · Tell your doctor if you have liver disease, sleep apnea, or a history of mental illness  · For some children, teenagers, and young adults, this medicine may increase mental or emotional problems  This may lead to thoughts of suicide and violence  Talk with your doctor right away if you have any thoughts or behavior changes that concern you  Tell your doctor if you or anyone in your family has a history of bipolar disorder or suicide attempts  · Do not stop using this medicine suddenly  Your doctor will need to slowly decrease your dose before you stop it completely  · Silenor® tablets may cause you to do things while you are still asleep that you may not remember the next morning, including driving a car, having sex, or eating food  Tell your doctor right away if you learn that this has happened  · This medicine may make you drowsy  Do not drive or do anything that could be dangerous until you know how this medicine affects you  · This medicine could cause infertility   Talk with your doctor before using this medicine if you plan to have children  · Your doctor will check your progress and the effects of this medicine at regular visits  Keep all appointments  · Silenor® tablets: Call your doctor if you still have trouble sleeping after you take this medicine for 7 to 10 days  · Keep all medicine out of the reach of children  Never share your medicine with anyone  Possible Side Effects While Using This Medicine:   Call your doctor right away if you notice any of these side effects:  · Allergic reaction: Itching or hives, swelling in your face or hands, swelling or tingling in your mouth or throat, chest tightness, trouble breathing  · Changes in behavior, or thoughts of hurting yourself or others  · Eye pain, vision changes, seeing halos around lights  · Fast, pounding, or uneven heartbeat  · Feeling nervous, restless, anxious, agitated, or excited for no reason  · Seizures or tremors  · Severe confusion, or seeing or hearing things that are not there  · Unusual bleeding or bruising  If you notice these less serious side effects, talk with your doctor:   · Constipation  · Dizziness or drowsiness  · Dry mouth  If you notice other side effects that you think are caused by this medicine, tell your doctor  Call your doctor for medical advice about side effects  You may report side effects to FDA at 3-613-FDA-4056  © Copyright [employer] 2021 Information is for End User's use only and may not be sold, redistributed or otherwise used for commercial purposes  The above information is an  only  It is not intended as medical advice for individual conditions or treatments  Talk to your doctor, nurse or pharmacist before following any medical regimen to see if it is safe and effective for you

## 2021-08-30 NOTE — PROGRESS NOTES
Review of Systems      Genitourinary difficulty with erection   Cardiology none   Gastrointestinal none   Neurology forgetfulness and difficulty with memory   Constitutional none   Integumentary none   Psychiatry none   Musculoskeletal none   Pulmonary none   ENT none   Endocrine none   Hematological none

## 2021-08-30 NOTE — ASSESSMENT & PLAN NOTE
· Recent diagnosis with AHI 8 7 events/hr  · ON Auto CPAP 5-20  · Have been using the CPAP every night however, recently less than 4 hrs per night  · Still having sig EDS   · Counseled for using the CPAP more than 7 hr per night  · Prescribed Doxepin 10 mg HS to help with the adjustment

## 2021-08-30 NOTE — ASSESSMENT & PLAN NOTE
He is not sleepy getting the benefit out of the CPAP is using only 3 hours 7 minutes per night, I counseled him extensively he needs to increase it was up more than 7 hours per night to start feel the difference

## 2021-08-30 NOTE — PROGRESS NOTES
Pulmonary/Sleep Follow Up Note   Angel Hermosillo 57 y o  male MRN: [id number]  8/30/2021      Assessment and Plan:    1  WALDO (obstructive sleep apnea)  Assessment & Plan:  · Recent diagnosis with AHI 8 7 events/hr  · ON Auto CPAP 5-20  · Have been using the CPAP every night however, recently less than 4 hrs per night  · Still having sig EDS   · Counseled for using the CPAP more than 7 hr per night  · Prescribed Doxepin 10 mg HS to help with the adjustment     Orders:  -     doxepin (SINEquan) 10 mg capsule; Take 1 capsule (10 mg total) by mouth daily at bedtime    2  Essential hypertension  Assessment & Plan:   Treatment with CPAP helps control blood pressure      3  Class 1 obesity without serious comorbidity with body mass index (BMI) of 31 0 to 31 9 in adult, unspecified obesity type  Assessment & Plan:   Noted, encouraged lifestyle modifications and weight loss      4  Fatigue, unspecified type  Assessment & Plan:   He is not sleepy getting the benefit out of the CPAP is using only 3 hours 7 minutes per night, I counseled him extensively he needs to increase it was up more than 7 hours per night to start feel the difference      5  Snoring  Assessment & Plan:   Secondary to obstructive sleep apnea as detailed above      6  Insomnia, unspecified type  Assessment & Plan:  Sleep maintenance insomnia I would like to start the patient on doxepin 10 mg HS as needed    Orders:  -     doxepin (SINEquan) 10 mg capsule; Take 1 capsule (10 mg total) by mouth daily at bedtime        Return in about 3 months (around 11/30/2021)      History of Present Illness   HPI:  Angel Hermosillo is a 57 y o  male who  Is here for follow-up visit the patient has been diagnosed with mild obstructive sleep apnea and he has symptomatic excessive daytime sleepiness he started using CPAP initially had a fullface mask and then switched to nasal mask he has been waking up in the middle of the night taking of the mask and fall asleep back he has been averaging use 3-4 hours per night but he sleeps for about 8 hours he still feels sleepy with having frequent awakenings during the night and overall dissatisfied with his sleep and subsequent excessive daytime sleepiness next day        Review of Systems      Genitourinary difficulty with erection   Cardiology none   Gastrointestinal none   Neurology forgetfulness and difficulty with memory   Constitutional none   Integumentary none   Psychiatry none   Musculoskeletal none   Pulmonary none   ENT none   Endocrine none   Hematological none           Historical Information   Past Medical History:   Diagnosis Date    Hypogonadotropic hypogonadism ([facility] [address] )      Past Surgical History:   Procedure Laterality Date    TONSILLECTOMY       Family History   Problem Relation Age of Onset    Hypertension Mother     Kidney failure Mother     Hypertension Father     Hypertension Brother     Heart attack Brother     Heart attack Brother         39         Meds/Allergies     Current Outpatient Medications:     b complex vitamins capsule, Take by mouth, Disp: , Rfl:     Cholecalciferol (VITAMIN D3) 5000 units CAPS, Take 1 capsule by mouth daily  , Disp: , Rfl:     fluticasone (FLONASE) 50 mcg/act nasal spray, 2 sprays into each nostril daily, Disp: , Rfl:     lisinopril (ZESTRIL) 20 mg tablet, Take 1 tablet (20 mg total) by mouth daily, Disp: 90 tablet, Rfl: 1    minocycline (MINOCIN) 50 mg capsule, Take 50 mg by mouth daily, Disp: , Rfl:     Omega-3 Fatty Acids (FISH OIL) 1200 MG CAPS, by Does not apply route, Disp: , Rfl:     omeprazole (PriLOSEC) 20 mg delayed release capsule, Take 1 capsule (20 mg total) by mouth daily, Disp: 90 capsule, Rfl: 3    Psyllium (METAMUCIL FIBER SINGLES PO), Take by mouth, Disp: , Rfl:     sildenafil (VIAGRA) 100 mg tablet, TAKE 1 TABLET AS NEEDED FORERECTILE DYSFUNCTION, Disp: 18 tablet, Rfl: 3    simvastatin (ZOCOR) 40 mg tablet, Take 1 tablet (40 mg total) by mouth daily, Disp: 90 tablet, Rfl: 3    doxepin (SINEquan) 10 mg capsule, Take 1 capsule (10 mg total) by mouth daily at bedtime, Disp: 30 capsule, Rfl: 0    meloxicam (MOBIC) 15 mg tablet, Take 1 tablet (15 mg total) by mouth daily for 14 days, Disp: 30 tablet, Rfl: 0  Allergies   Allergen Reactions    Moxifloxacin      Causes low BP       Vitals: Blood pressure 120/70, pulse 84, height 5' 11" (1 803 m), weight 101 kg (223 lb), SpO2 98 %  Body mass index is 31 1 kg/m²  Oxygen Therapy  SpO2: 98 %      Physical Exam  Physical Exam  Constitutional:       General: He is not in acute distress  Appearance: Normal appearance  He is obese  He is not ill-appearing or diaphoretic  HENT:      Head: Normocephalic and atraumatic  Nose: No congestion or rhinorrhea  Mouth/Throat:      Mouth: Mucous membranes are moist       Pharynx: Oropharynx is clear  No oropharyngeal exudate or posterior oropharyngeal erythema  Eyes:      General: No scleral icterus  Right eye: No discharge  Left eye: No discharge  Extraocular Movements: Extraocular movements intact  Conjunctiva/sclera: Conjunctivae normal    Cardiovascular:      Rate and Rhythm: Normal rate and regular rhythm  Pulses: Normal pulses  Heart sounds: Normal heart sounds  No murmur heard  No gallop  Pulmonary:      Effort: Pulmonary effort is normal  No respiratory distress  Breath sounds: Normal breath sounds  No wheezing, rhonchi or rales  Abdominal:      General: Abdomen is flat  There is no distension  Palpations: Abdomen is soft  Tenderness: There is no abdominal tenderness  Musculoskeletal:         General: No swelling, tenderness, deformity or signs of injury  Cervical back: Normal range of motion and neck supple  No rigidity  Right lower leg: No edema  Left lower leg: No edema  Skin:     General: Skin is warm and dry  Coloration: Skin is not jaundiced or pale        Findings: No bruising, erythema, lesion or rash  Neurological:      General: No focal deficit present  Mental Status: He is alert and oriented to person, place, and time  Mental status is at baseline  Psychiatric:         Mood and Affect: Mood normal          Behavior: Behavior normal          Thought Content: Thought content normal          Judgment: Judgment normal          Labs: I have personally reviewed pertinent lab results  , ABG: No results found for: PHART, PAR8VXP, PO2ART, ZYG9XIQ, D9WTQCOZ, BEART, SOURCE, BNP: No results found for: BNP, CBC: No results found for: WBC, HGB, HCT, MCV, PLT, ADJUSTEDWBC, MCH, MCHC, RDW, MPV, NRBC, CMP: No results found for: SODIUM, K, CL, CO2, ANIONGAP, BUN, CREATININE, GLUCOSE, CALCIUM, AST, ALT, ALKPHOS, PROT, BILITOT, EGFR, PT/INR: No results found for: PT, INR, Troponin: No results found for: TROPONINI  Lab Results   Component Value Date    WBC 11 89 (H) 02/08/2021    HGB 15 2 02/08/2021    HCT 46 9 02/08/2021    MCV 91 02/08/2021     (H) 02/08/2021     Lab Results   Component Value Date    GLUCOSE 98 11/13/2015    CALCIUM 10 0 02/08/2021     11/13/2015    K 4 5 02/08/2021    CO2 26 02/08/2021     02/08/2021    BUN 26 (H) 02/08/2021    CREATININE 1 05 02/08/2021     No results found for: IGE  Lab Results   Component Value Date    ALT 53 02/08/2021    AST 23 02/08/2021    ALKPHOS 83 02/08/2021    BILITOT 0 61 11/13/2015           Sleep studies: I have personally reviewed pertinent reports  HST 04/2021 with an average AHI of 8 7 events per hour consistent with mild obstructive sleep apnea    Compliance report:I have personally reviewed pertinent reports  July through August 2021 80% use, 36% more than 4 hours, residual AHI 4 4, consistent with inadequate use of CPAP          Isaac Corea MD  [id number] [facility]       Portions of the record may have been created with voice recognition software   Occasional wrong word or "sound a like" substitutions may have occurred due to the inherent limitations of voice recognition software  Read the chart carefully and recognize, using context, where substitutions have occurred

## 2021-12-02 ENCOUNTER — TELEMEDICINE (OUTPATIENT)
Dept: FAMILY MEDICINE CLINIC | Facility: CLINIC | Age: 61
End: 2021-12-02
Payer: COMMERCIAL

## 2021-12-02 DIAGNOSIS — J02.9 SORE THROAT: Primary | ICD-10-CM

## 2021-12-02 PROCEDURE — 99213 OFFICE O/P EST LOW 20 MIN: CPT | Performed by: FAMILY MEDICINE

## 2021-12-02 RX ORDER — AZITHROMYCIN 250 MG/1
TABLET, FILM COATED ORAL
Qty: 6 TABLET | Refills: 0 | Status: SHIPPED | OUTPATIENT
Start: 2021-12-02 | End: 2021-12-06

## 2021-12-07 PROCEDURE — U0003 INFECTIOUS AGENT DETECTION BY NUCLEIC ACID (DNA OR RNA); SEVERE ACUTE RESPIRATORY SYNDROME CORONAVIRUS 2 (SARS-COV-2) (CORONAVIRUS DISEASE [COVID-19]), AMPLIFIED PROBE TECHNIQUE, MAKING USE OF HIGH THROUGHPUT TECHNOLOGIES AS DESCRIBED BY CMS-2020-01-R: HCPCS | Performed by: FAMILY MEDICINE

## 2021-12-07 PROCEDURE — U0005 INFEC AGEN DETEC AMPLI PROBE: HCPCS | Performed by: FAMILY MEDICINE

## 2021-12-29 ENCOUNTER — NURSE TRIAGE (OUTPATIENT)
Dept: OTHER | Facility: OTHER | Age: 61
End: 2021-12-29

## 2021-12-29 DIAGNOSIS — N52.9 MALE ERECTILE DISORDER: Primary | ICD-10-CM

## 2021-12-29 RX ORDER — TADALAFIL 20 MG/1
20 TABLET ORAL DAILY PRN
Qty: 18 TABLET | Refills: 0 | Status: SHIPPED | OUTPATIENT
Start: 2021-12-29

## 2022-01-10 ENCOUNTER — OFFICE VISIT (OUTPATIENT)
Dept: SLEEP CENTER | Facility: CLINIC | Age: 62
End: 2022-01-10
Payer: COMMERCIAL

## 2022-01-10 VITALS
DIASTOLIC BLOOD PRESSURE: 80 MMHG | WEIGHT: 226.8 LBS | TEMPERATURE: 97.5 F | HEART RATE: 87 BPM | SYSTOLIC BLOOD PRESSURE: 148 MMHG | HEIGHT: 72 IN | BODY MASS INDEX: 30.72 KG/M2 | OXYGEN SATURATION: 97 %

## 2022-01-10 DIAGNOSIS — G47.33 OSA (OBSTRUCTIVE SLEEP APNEA): Primary | ICD-10-CM

## 2022-01-10 DIAGNOSIS — J30.9 ALLERGIC RHINITIS, UNSPECIFIED SEASONALITY, UNSPECIFIED TRIGGER: ICD-10-CM

## 2022-01-10 DIAGNOSIS — R53.83 FATIGUE, UNSPECIFIED TYPE: ICD-10-CM

## 2022-01-10 DIAGNOSIS — G47.00 INSOMNIA, UNSPECIFIED TYPE: ICD-10-CM

## 2022-01-10 DIAGNOSIS — R06.83 SNORING: ICD-10-CM

## 2022-01-10 PROCEDURE — 99214 OFFICE O/P EST MOD 30 MIN: CPT | Performed by: INTERNAL MEDICINE

## 2022-01-10 NOTE — PROGRESS NOTES
Pulmonary/Sleep Follow Up Note   Sary Islas 57 y o  male MRN: [id number]  1/10/2022      Assessment and Plan:    1  WALDO (obstructive sleep apnea)  Assessment & Plan:  · Mild WALDO with an AHI of 8 7 events per hour, on CPAP with a pressure range of 5-20 cm H2O his compliance has started to get slightly better than last time he is averaging 5 hours 22 minutes per still overall 66 7% more than 4 hours over the last 30 days  Still has excessive daytime fatigue he thinks that this is due to his recent diagnosis of COVID in December 2020  I counseled him extensively continue using CPAP and try to push it more than 4 hours  2  Allergic rhinitis, unspecified seasonality, unspecified trigger  Assessment & Plan:  He has been using Flonase nasal spray would not significant help I recommended that he could start trying saline nasal wash as      3  Insomnia, unspecified type  Assessment & Plan:  His get a sleep maintenance insomnia he takes doxepin as needed with help to sleep however he feels tired next day when he uses so that is why he does not do would as frequent  4  Snoring  Assessment & Plan:  Secondary to WALDO, detailed above      5  Fatigue, unspecified type  Assessment & Plan:  WALDO as a contributing factor, he will need to increase his CPAP compliance and use more than 7 hours per night to get a full benefit out of it, counseled him        Return in about 1 year (around 1/10/2023)      History of Present Illness   HPI:  Sary Islas is a 57 y o  male who  Is here for follow-up visit the patient has been diagnosed with mild obstructive sleep apnea and he has symptomatic excessive daytime sleepiness he started using CPAP initially had a fullface mask and then switched to nasal mask he has been waking up in the middle of the night taking of the mask and fall asleep back he has been averaging use 4 hours per night but he sleeps for about 8 hours he still feels sleepy with having frequent awakenings during the night  Nasal congestion has been happening frequently and he thinks that this is a major contributing factor for him to take the mask off, recommended to continue using Flonase and start using saline nasal washes for him    His [place] Scale is down to 4/24       Review of Systems      Genitourinary difficulty with erection   Cardiology none   Gastrointestinal none   Neurology forgetfulness   Constitutional fatigue   Integumentary none   Psychiatry none   Musculoskeletal none   Pulmonary snoring   ENT none   Endocrine none   Hematological none           Historical Information   Past Medical History:   Diagnosis Date    Hypogonadotropic hypogonadism ([facility] [address] )      Past Surgical History:   Procedure Laterality Date    TONSILLECTOMY       Family History   Problem Relation Age of Onset    Hypertension Mother     Kidney failure Mother     Hypertension Father     Hypertension Brother     Heart attack Brother     Heart attack Brother         39         Meds/Allergies     Current Outpatient Medications:     b complex vitamins capsule, Take by mouth, Disp: , Rfl:     Cholecalciferol (VITAMIN D3) 5000 units CAPS, Take 1 capsule by mouth daily  , Disp: , Rfl:     doxepin (SINEquan) 10 mg capsule, Take 1 capsule (10 mg total) by mouth daily at bedtime, Disp: 30 capsule, Rfl: 0    fluticasone (FLONASE) 50 mcg/act nasal spray, 2 sprays into each nostril daily, Disp: , Rfl:     minocycline (MINOCIN) 50 mg capsule, Take 50 mg by mouth daily, Disp: , Rfl:     Omega-3 Fatty Acids (FISH OIL) 1200 MG CAPS, by Does not apply route, Disp: , Rfl:     omeprazole (PriLOSEC) 20 mg delayed release capsule, Take 1 capsule (20 mg total) by mouth daily, Disp: 90 capsule, Rfl: 3    Psyllium (METAMUCIL FIBER SINGLES PO), Take by mouth, Disp: , Rfl:     sildenafil (VIAGRA) 100 mg tablet, TAKE 1 TABLET AS NEEDED FORERECTILE DYSFUNCTION, Disp: 18 tablet, Rfl: 3    simvastatin (ZOCOR) 40 mg tablet, Take 1 tablet (40 mg total) by mouth daily, Disp: 90 tablet, Rfl: 3    tadalafil (CIALIS) 20 MG tablet, Take 1 tablet (20 mg total) by mouth daily as needed for erectile dysfunction, Disp: 18 tablet, Rfl: 0    lisinopril (ZESTRIL) 20 mg tablet, Take 1 tablet (20 mg total) by mouth daily, Disp: 90 tablet, Rfl: 1    meloxicam (MOBIC) 15 mg tablet, Take 1 tablet (15 mg total) by mouth daily for 14 days, Disp: 30 tablet, Rfl: 0  Allergies   Allergen Reactions    Moxifloxacin      Causes low BP       Vitals: Blood pressure 148/80, pulse 87, temperature 97 5 °F (36 4 °C), height 6' (1 829 m), weight 103 kg (226 lb 12 8 oz), SpO2 97 %  Body mass index is 30 76 kg/m²  Oxygen Therapy  SpO2: 97 % (room air )      Physical Exam  Physical Exam  Constitutional:       General: He is not in acute distress  Appearance: Normal appearance  He is obese  He is not ill-appearing or diaphoretic  HENT:      Head: Normocephalic and atraumatic  Nose: No congestion or rhinorrhea  Mouth/Throat:      Mouth: Mucous membranes are moist       Pharynx: Oropharynx is clear  No oropharyngeal exudate or posterior oropharyngeal erythema  Eyes:      General: No scleral icterus  Right eye: No discharge  Left eye: No discharge  Extraocular Movements: Extraocular movements intact  Conjunctiva/sclera: Conjunctivae normal    Cardiovascular:      Rate and Rhythm: Normal rate and regular rhythm  Pulses: Normal pulses  Heart sounds: Normal heart sounds  No murmur heard  No gallop  Pulmonary:      Effort: Pulmonary effort is normal  No respiratory distress  Breath sounds: Normal breath sounds  No wheezing, rhonchi or rales  Abdominal:      General: Abdomen is flat  There is no distension  Palpations: Abdomen is soft  Tenderness: There is no abdominal tenderness  Musculoskeletal:         General: No swelling, tenderness, deformity or signs of injury  Cervical back: Normal range of motion and neck supple   No rigidity  Right lower leg: No edema  Left lower leg: No edema  Skin:     General: Skin is warm and dry  Coloration: Skin is not jaundiced or pale  Findings: No bruising, erythema, lesion or rash  Neurological:      General: No focal deficit present  Mental Status: He is alert and oriented to person, place, and time  Mental status is at baseline  Psychiatric:         Mood and Affect: Mood normal          Behavior: Behavior normal          Thought Content: Thought content normal          Judgment: Judgment normal          Labs: I have personally reviewed pertinent lab results  , ABG: No results found for: PHART, VXA2SXB, PO2ART, YLF9TON, Y9TSTPMD, BEART, SOURCE, BNP: No results found for: BNP, CBC: No results found for: WBC, HGB, HCT, MCV, PLT, ADJUSTEDWBC, MCH, MCHC, RDW, MPV, NRBC, CMP: No results found for: SODIUM, K, CL, CO2, ANIONGAP, BUN, CREATININE, GLUCOSE, CALCIUM, AST, ALT, ALKPHOS, PROT, BILITOT, EGFR, PT/INR: No results found for: PT, INR, Troponin: No results found for: TROPONINI  Lab Results   Component Value Date    WBC 11 89 (H) 02/08/2021    HGB 15 2 02/08/2021    HCT 46 9 02/08/2021    MCV 91 02/08/2021     (H) 02/08/2021     Lab Results   Component Value Date    GLUCOSE 98 11/13/2015    CALCIUM 10 0 02/08/2021     11/13/2015    K 4 5 02/08/2021    CO2 26 02/08/2021     02/08/2021    BUN 26 (H) 02/08/2021    CREATININE 1 05 02/08/2021     No results found for: IGE  Lab Results   Component Value Date    ALT 53 02/08/2021    AST 23 02/08/2021    ALKPHOS 83 02/08/2021    BILITOT 0 61 11/13/2015           Sleep studies: I have personally reviewed pertinent reports  HST 04/2021 with an average AHI of 8 7 events per hour consistent with mild obstructive sleep apnea    Compliance report:I have personally reviewed pertinent reports      12/2021=1/2022 83% use, 66 7% more than 4 hours, residual AHI 3          Ysabel Geiger MD  [facility]       Portions of the record may have been created with voice recognition software  Occasional wrong word or "sound a like" substitutions may have occurred due to the inherent limitations of voice recognition software  Read the chart carefully and recognize, using context, where substitutions have occurred

## 2022-01-10 NOTE — ASSESSMENT & PLAN NOTE
· Mild WALDO with an AHI of 8 7 events per hour, on CPAP with a pressure range of 5-20 cm H2O his compliance has started to get slightly better than last time he is averaging 5 hours 22 minutes per still overall 66 7% more than 4 hours over the last 30 days  Still has excessive daytime fatigue he thinks that this is due to his recent diagnosis of COVID in December 2020  I counseled him extensively continue using CPAP and try to push it more than 4 hours

## 2022-01-10 NOTE — ASSESSMENT & PLAN NOTE
He has been using Flonase nasal spray would not significant help I recommended that he could start trying saline nasal wash as

## 2022-01-10 NOTE — ASSESSMENT & PLAN NOTE
His get a sleep maintenance insomnia he takes doxepin as needed with help to sleep however he feels tired next day when he uses so that is why he does not do would as frequent

## 2022-01-10 NOTE — ASSESSMENT & PLAN NOTE
WALDO as a contributing factor, he will need to increase his CPAP compliance and use more than 7 hours per night to get a full benefit out of it, counseled him

## 2022-01-10 NOTE — PROGRESS NOTES
Review of Systems      Genitourinary difficulty with erection   Cardiology none   Gastrointestinal none   Neurology forgetfulness   Constitutional fatigue   Integumentary none   Psychiatry none   Musculoskeletal none   Pulmonary snoring   ENT none   Endocrine none   Hematological none

## 2022-01-10 NOTE — PATIENT INSTRUCTIONS
Sleep Apnea   AMBULATORY CARE:   Sleep apnea  is a condition that causes you to stop breathing often during sleep  Types of sleep apnea:   · Obstructive sleep apnea (WALDO)  is the most common kind  The muscles and tissues around your throat relax and block air from passing through  Obesity, use of alcohol or cigarettes, or a family history are common causes  WALDO may increase your risk for complications after surgery  · Central sleep apnea (CSA)  means your brain does not send signals to the muscles that control breathing  You do not take a breath even though your airway is open  Common causes include medical conditions such as heart failure, being older than 40, or use of opioids  · Complex (or mixed) sleep apnea  means you have both obstructive and central sleep apnea  Common signs and symptoms:   · Loud snoring or long pauses in breathing    · Feeling sleepy, slow, and tired during the day    · Snorting, gasping, or choking while you sleep, and waking up suddenly because of these    · Feeling irritable during the day    · Dry mouth or a headache in the mornings    · Heavy night sweating    · A hard time thinking, remembering things, or focusing on your tasks the following day    Call your local emergency number (911 in the [address] [facility]) if:   · You have chest pain or trouble breathing  Call your doctor if:   · You have new or worsening signs or symptoms  · You have questions or concerns about your condition or care  Treatment  depends on the kind of apnea you have  · A mouth device  may be needed if you have mild sleep apnea  These are designed to keep your throat open  Ask your dentist or healthcare provider about the best mouth device for you  · A machine  may be used to help you get more air during sleep  A mask may be placed over your nose and mouth, or just your nose  The mask is hooked to the machine  You will get air through the mask      ? A continuous positive airway pressure (CPAP) machine is used to keep your airway open during sleep  The machine blows a gentle stream of air into the mask when you breathe  This helps keep your airway open so you can breathe more regularly  Extra oxygen may be given through the machine  ? A bilevel positive airway pressure (BiPAP) machine  gives air but lowers the pressure when you breathe out  ? An adaptive servo-ventilator (ASV)  is a machine that learns your usual breathing pattern  Then, it uses pressure to give you air and prevent stops in your breathing  · Surgery  to expand your airway or remove extra tissues may be needed  Surgery is usually only considered if other treatments do not work  Manage or prevent sleep apnea:   · Reach and maintain a healthy weight  Ask your healthcare provider what a healthy weight is for you  Ask him or her to help you create a safe weight loss plan if you are overweight  Even a small goal of a 10% weight loss can improve your symptoms  · Do not smoke  Nicotine and other chemicals in cigarettes and cigars can cause lung damage  Ask your healthcare provider for information if you currently smoke and need help to quit  E-cigarettes or smokeless tobacco still contain nicotine  Talk to your healthcare provider before you use these products  · Do not drink alcohol or take sedative medicine before you go to sleep  Alcohol and sedatives can relax the muscles and tissues around your throat  This can block the airflow to your lungs  · Sleep on your side or use pillows designed to prevent sleep apnea  This prevents your tongue or other tissues from blocking your throat  You can also raise the head of your bed  Follow up with your doctor or specialist as directed: You may need to have blood tests during your follow-up visits  Work with your provider to find the right breathing support equipment and settings for you  Write down your questions so you remember to ask them during your visits    © Copyright [employer] 2021 Information is for [employer] use only and may not be sold, redistributed or otherwise used for commercial purposes  All illustrations and images included in CareNotes® are the copyrighted property of A D A M , Inc  or Karie Mcdonald  The above information is an  only  It is not intended as medical advice for individual conditions or treatments  Talk to your doctor, nurse or pharmacist before following any medical regimen to see if it is safe and effective for you  Irrigation Solution [employer] Aid First Aid Saline Wound Wash, Curity Sterile Saline, Physiolyte, Tis-U-Sol) - (Wash or rinse)     Why this medicine is used:   Cleans and rinses areas of your body  Common side effects:  · Treated area does not get better or gets worse    © Copyright [employer] 2021 Information is for End User's use only and may not be sold, redistributed or otherwise used for commercial purposes

## 2022-01-26 ENCOUNTER — OFFICE VISIT (OUTPATIENT)
Dept: FAMILY MEDICINE CLINIC | Facility: CLINIC | Age: 62
End: 2022-01-26
Payer: COMMERCIAL

## 2022-01-26 VITALS
BODY MASS INDEX: 30.8 KG/M2 | WEIGHT: 220 LBS | RESPIRATION RATE: 16 BRPM | HEIGHT: 71 IN | DIASTOLIC BLOOD PRESSURE: 86 MMHG | SYSTOLIC BLOOD PRESSURE: 124 MMHG | HEART RATE: 84 BPM | TEMPERATURE: 98.1 F | OXYGEN SATURATION: 98 %

## 2022-01-26 DIAGNOSIS — I10 PRIMARY HYPERTENSION: Primary | ICD-10-CM

## 2022-01-26 DIAGNOSIS — Z12.5 SCREENING PSA (PROSTATE SPECIFIC ANTIGEN): ICD-10-CM

## 2022-01-26 DIAGNOSIS — R53.83 FATIGUE, UNSPECIFIED TYPE: ICD-10-CM

## 2022-01-26 DIAGNOSIS — K21.9 GASTROESOPHAGEAL REFLUX DISEASE WITHOUT ESOPHAGITIS: ICD-10-CM

## 2022-01-26 DIAGNOSIS — U09.9 POST-COVID SYNDROME: ICD-10-CM

## 2022-01-26 DIAGNOSIS — G47.33 OSA (OBSTRUCTIVE SLEEP APNEA): ICD-10-CM

## 2022-01-26 DIAGNOSIS — E78.2 MIXED HYPERLIPIDEMIA: ICD-10-CM

## 2022-01-26 DIAGNOSIS — E55.9 VITAMIN D DEFICIENCY: ICD-10-CM

## 2022-01-26 PROCEDURE — 99214 OFFICE O/P EST MOD 30 MIN: CPT | Performed by: FAMILY MEDICINE

## 2022-01-26 PROCEDURE — 3008F BODY MASS INDEX DOCD: CPT | Performed by: FAMILY MEDICINE

## 2022-01-26 PROCEDURE — 1036F TOBACCO NON-USER: CPT | Performed by: FAMILY MEDICINE

## 2022-01-26 NOTE — PROGRESS NOTES
Assessment/Plan:  Patient declines flu vaccine today  Patient given lab requisition for fasting labs as below  Patient to continue present treatment  Patient instructed to follow a low-fat, low-salt and a low-carbohydrate diet and get regular aerobic exercise 150 minutes per week  Weight loss encouraged  Patient to continue CPAP nightly  Discussed referral to [facility] post [facility] physical therapy program   Patient will consider it  Return the office in 6 months  Diagnoses and all orders for this visit:    Primary hypertension  -     CBC and Platelet; Future  -     Comprehensive metabolic panel; Future  -     TSH, 3rd generation with Free T4 reflex; Future  -     UA w Reflex to Microscopic w Reflex to Culture -Lab Collect; Future    Mixed hyperlipidemia  -     Comprehensive metabolic panel; Future  -     Lipid panel; Future    Gastroesophageal reflux disease without esophagitis    WALDO (obstructive sleep apnea)    Post-COVID syndrome    Fatigue, unspecified type    Vitamin D deficiency  -     Vitamin D 25 hydroxy; Future    Screening PSA (prostate specific antigen)  -     PSA, Total Screen; Future          Subjective:      Patient ID: Carley Runner is a 60 y o  male  Patient is here for follow-up appoint for chronic conditions and due for fasting labs  Patient has been feeling fairly well overall although complains of continued fatigue and occasional memory problems related to COVID-19 infection over a year ago  Patient is working full-time and exercises regularly  Patient uses CPAP nightly for about 6 hours  Patient declines flu vaccine  Patient did not get COVID-19 booster vaccine  Hypertension  This is a chronic problem  The problem is controlled  Pertinent negatives include no anxiety, blurred vision, chest pain, headaches, orthopnea, palpitations, peripheral edema, PND or shortness of breath   Risk factors for coronary artery disease include dyslipidemia, family history and male gender  Past treatments include ACE inhibitors  The current treatment provides significant improvement  There are no compliance problems  There is no history of CAD/MI or CVA  Hyperlipidemia  This is a chronic problem  The problem is controlled  He has no history of diabetes or hypothyroidism  Pertinent negatives include no chest pain, focal sensory loss, focal weakness, leg pain, myalgias or shortness of breath  Current antihyperlipidemic treatment includes statins  The current treatment provides significant improvement of lipids  There are no compliance problems  The following portions of the patient's history were reviewed and updated as appropriate: allergies, current medications, past family history, past medical history, past social history, past surgical history and problem list     Review of Systems   Eyes: Negative for blurred vision  Respiratory: Negative for shortness of breath  Cardiovascular: Negative for chest pain, palpitations, orthopnea and PND  Musculoskeletal: Negative for myalgias  Neurological: Negative for focal weakness and headaches  Objective:      /86   Pulse 84   Temp 98 1 °F (36 7 °C) (Skin)   Resp 16   Ht 5' 11" (1 803 m)   Wt 99 8 kg (220 lb)   SpO2 98%   BMI 30 68 kg/m²          Physical Exam  Constitutional:       General: He is not in acute distress  Appearance: Normal appearance  HENT:      Head: Normocephalic  Mouth/Throat:      Mouth: Mucous membranes are moist    Eyes:      General: No scleral icterus  Conjunctiva/sclera: Conjunctivae normal    Neck:      Vascular: No carotid bruit  Cardiovascular:      Rate and Rhythm: Normal rate and regular rhythm  Pulmonary:      Effort: Pulmonary effort is normal       Breath sounds: Normal breath sounds  Abdominal:      Palpations: Abdomen is soft  Tenderness: There is no abdominal tenderness  Musculoskeletal:      Cervical back: Neck supple  Right lower leg: No edema  Left lower leg: No edema  Lymphadenopathy:      Cervical: No cervical adenopathy  Skin:     General: Skin is warm and dry  Neurological:      General: No focal deficit present  Mental Status: He is alert and oriented to person, place, and time  Psychiatric:         Mood and Affect: Mood normal          Behavior: Behavior normal          Thought Content:  Thought content normal          Judgment: Judgment normal

## 2022-02-05 DIAGNOSIS — I10 ESSENTIAL HYPERTENSION: ICD-10-CM

## 2022-02-05 RX ORDER — LISINOPRIL 20 MG/1
TABLET ORAL
Qty: 90 TABLET | Refills: 1 | Status: SHIPPED | OUTPATIENT
Start: 2022-02-05 | End: 2022-07-25

## 2022-04-14 ENCOUNTER — TELEMEDICINE (OUTPATIENT)
Dept: FAMILY MEDICINE CLINIC | Facility: CLINIC | Age: 62
End: 2022-04-14
Payer: COMMERCIAL

## 2022-04-14 DIAGNOSIS — J01.90 ACUTE SINUSITIS, RECURRENCE NOT SPECIFIED, UNSPECIFIED LOCATION: Primary | ICD-10-CM

## 2022-04-14 DIAGNOSIS — J40 BRONCHITIS: ICD-10-CM

## 2022-04-14 PROCEDURE — U0005 INFEC AGEN DETEC AMPLI PROBE: HCPCS | Performed by: FAMILY MEDICINE

## 2022-04-14 PROCEDURE — 99213 OFFICE O/P EST LOW 20 MIN: CPT | Performed by: FAMILY MEDICINE

## 2022-04-14 PROCEDURE — U0003 INFECTIOUS AGENT DETECTION BY NUCLEIC ACID (DNA OR RNA); SEVERE ACUTE RESPIRATORY SYNDROME CORONAVIRUS 2 (SARS-COV-2) (CORONAVIRUS DISEASE [COVID-19]), AMPLIFIED PROBE TECHNIQUE, MAKING USE OF HIGH THROUGHPUT TECHNOLOGIES AS DESCRIBED BY CMS-2020-01-R: HCPCS | Performed by: FAMILY MEDICINE

## 2022-04-14 RX ORDER — AZITHROMYCIN 250 MG/1
TABLET, FILM COATED ORAL
Qty: 6 TABLET | Refills: 0 | Status: SHIPPED | OUTPATIENT
Start: 2022-04-14 | End: 2022-04-18

## 2022-04-14 NOTE — PROGRESS NOTES
Virtual Regular Visit    Verification of patient location:    Patient is located in the following state in which I hold an active license [place]      Assessment/Plan:  Patient to report to the office parking lot today for COVID-19 PCR testing  Will heed results  Patient to remain on home quarantine until test results known  Patient was started on a Z-Bill and may take Mucinex or Robitussin p r n  Recommend increase fluids and rest   Follow-up next week or call sooner p r n  Or report to the emergency room for worsening symptoms including shortness of breath  Problem List Items Addressed This Visit     None      Visit Diagnoses     Acute sinusitis, recurrence not specified, unspecified location    -  Primary    Relevant Medications    azithromycin (ZITHROMAX) 250 mg tablet    Other Relevant Orders    COVID Only - Office Collect    Bronchitis        Relevant Medications    azithromycin (ZITHROMAX) 250 mg tablet    Other Relevant Orders    COVID Only - Office Collect               Reason for visit is   Chief Complaint   Patient presents with    Sore Throat     Started Monday evening    Fever     100F    Cough    Sinus Problem     blocked    COVID-19     took 2 at home covid tests and both were negative    Virtual Regular Visit        Encounter provider Karol Singleton DO    Provider located at [address] [place] [address] [phone]      Recent Visits  No visits were found meeting these conditions  Showing recent visits within past 7 days and meeting all other requirements  Today's Visits  Date Type Provider Dept   04/14/22 [address], DO [facility]   Showing today's visits and meeting all other requirements  Future Appointments  No visits were found meeting these conditions  Showing future appointments within next 150 days and meeting all other requirements       The patient was identified by name and date of birth   Aarti Reyes was informed that this is a telemedicine visit and that the visit is being conducted through Telephone  My office door was closed  No one else was in the room  He acknowledged consent and understanding of privacy and security of the video platform  The patient has agreed to participate and understands they can discontinue the visit at any time  It was my intent to perform this visit via video technology but the patient was not able to do a video connection so the visit was completed via audio telephone only  Patient is aware this is a billable service  Subjective  Edilma Huerta is a 60 y o  male started 3 days ago on 04/11/2020 to with nasal congestion productive of clear mucus, postnasal drainage, sore throat and nonproductive cough  He denies chest tightness, wheezing or shortness of breath  He admits to chest congestion  Patient admits to low-grade fevers to 100 and sweats but denies chills or body aches  Patient admits to sinus pressure headache and fatigue  He denies loss of sense of smell or taste, nausea, vomiting or diarrhea  No known COVID exposure  Patient had positive COVID infection on 12/08/2020 and received COVID-19 Moderna vaccine x2 on 04/14/2021 and 05/17/2021 but no booster  Patient took home COVID test which was negative x2 on Tuesday 04/12/2022 and again this morning  He has been out of work since Tuesday  Patient has treated this with DayQuil and NyQuil with some relief        HPI     Past Medical History:   Diagnosis Date    Hypogonadotropic hypogonadism [facility])        Past Surgical History:   Procedure Laterality Date    TONSILLECTOMY         Current Outpatient Medications   Medication Sig Dispense Refill    b complex vitamins capsule Take by mouth      Cholecalciferol (VITAMIN D3) 5000 units CAPS Take 1 capsule by mouth daily        doxepin (SINEquan) 10 mg capsule Take 1 capsule (10 mg total) by mouth daily at bedtime 30 capsule 0    fluticasone (FLONASE) 50 mcg/act nasal spray 2 sprays into each nostril daily      lisinopril (ZESTRIL) 20 mg tablet TAKE 1 TABLET DAILY 90 tablet 1    minocycline (MINOCIN) 50 mg capsule Take 50 mg by mouth daily      Omega-3 Fatty Acids (FISH OIL) 1200 MG CAPS by Does not apply route      omeprazole (PriLOSEC) 20 mg delayed release capsule Take 1 capsule (20 mg total) by mouth daily 90 capsule 3    Psyllium (METAMUCIL FIBER SINGLES PO) Take by mouth      sildenafil (VIAGRA) 100 mg tablet TAKE 1 TABLET AS NEEDED FORERECTILE DYSFUNCTION 18 tablet 3    simvastatin (ZOCOR) 40 mg tablet Take 1 tablet (40 mg total) by mouth daily 90 tablet 3    tadalafil (CIALIS) 20 MG tablet Take 1 tablet (20 mg total) by mouth daily as needed for erectile dysfunction 18 tablet 0    azithromycin (ZITHROMAX) 250 mg tablet Take 2 tablets today then 1 tablet daily x 4 days 6 tablet 0     No current facility-administered medications for this visit  Allergies   Allergen Reactions    Moxifloxacin      Causes low BP       Review of Systems    Video Exam    There were no vitals filed for this visit  Physical Exam     I spent 15 minutes directly with the patient during this visit    VIRTUAL VISIT [facility] verbally agrees to participate in [employer]  Pt is aware that [employer] could be limited without vital signs or the ability to perform a full hands-on physical Ankit Rice understands he or the provider may request at any time to terminate the video visit and request the patient to seek care or treatment in person

## 2022-04-15 LAB — SARS-COV-2 RNA RESP QL NAA+PROBE: NEGATIVE

## 2022-05-31 ENCOUNTER — OFFICE VISIT (OUTPATIENT)
Dept: FAMILY MEDICINE CLINIC | Facility: CLINIC | Age: 62
End: 2022-05-31
Payer: COMMERCIAL

## 2022-05-31 VITALS
HEIGHT: 71 IN | HEART RATE: 84 BPM | OXYGEN SATURATION: 96 % | TEMPERATURE: 97.6 F | WEIGHT: 220 LBS | BODY MASS INDEX: 30.8 KG/M2 | DIASTOLIC BLOOD PRESSURE: 80 MMHG | SYSTOLIC BLOOD PRESSURE: 136 MMHG | RESPIRATION RATE: 16 BRPM

## 2022-05-31 DIAGNOSIS — S46.811A STRAIN OF RIGHT TRAPEZIUS MUSCLE, INITIAL ENCOUNTER: ICD-10-CM

## 2022-05-31 DIAGNOSIS — J06.9 UPPER RESPIRATORY TRACT INFECTION, UNSPECIFIED TYPE: Primary | ICD-10-CM

## 2022-05-31 PROCEDURE — 87636 SARSCOV2 & INF A&B AMP PRB: CPT | Performed by: FAMILY MEDICINE

## 2022-05-31 PROCEDURE — 3008F BODY MASS INDEX DOCD: CPT | Performed by: FAMILY MEDICINE

## 2022-05-31 PROCEDURE — 99214 OFFICE O/P EST MOD 30 MIN: CPT | Performed by: FAMILY MEDICINE

## 2022-05-31 RX ORDER — METHOCARBAMOL 750 MG/1
750 TABLET, FILM COATED ORAL
Qty: 30 TABLET | Refills: 0 | Status: SHIPPED | OUTPATIENT
Start: 2022-05-31

## 2022-05-31 RX ORDER — AZITHROMYCIN 250 MG/1
TABLET, FILM COATED ORAL
Qty: 6 TABLET | Refills: 0 | Status: SHIPPED | OUTPATIENT
Start: 2022-05-31 | End: 2022-06-04

## 2022-05-31 RX ORDER — BROMPHENIRAMINE MALEATE, PSEUDOEPHEDRINE HYDROCHLORIDE, AND DEXTROMETHORPHAN HYDROBROMIDE 2; 30; 10 MG/5ML; MG/5ML; MG/5ML
5 SYRUP ORAL 4 TIMES DAILY PRN
Qty: 120 ML | Refills: 0 | Status: SHIPPED | OUTPATIENT
Start: 2022-05-31 | End: 2022-07-21

## 2022-05-31 NOTE — PROGRESS NOTES
Assessment/Plan:  Discussed diagnostic and treatment options with patient  Nasal swab obtained for COVID-19 PCR testing  Will heed results  Patient was started on Z-Bill and Bromfed DM p r n  Recommend increase fluids and rest   Patient may use Flonase p r n   If cough persists recommend chest x-ray  Patient was started on methocarbamol 750 mg 1 q h s  p r n , caution regarding drowsiness  Recommend stretching, ice and heat p r n  [facility] Return the office in 1 week or call sooner p r n  [facility] Diagnoses and all orders for this visit:    Upper respiratory tract infection, unspecified type  -     azithromycin (ZITHROMAX) 250 mg tablet; Take 2 tablets today then 1 tablet daily x 4 days  -     brompheniramine-pseudoephedrine-DM 30-2-10 MG/5ML syrup; Take 5 mL by mouth 4 (four) times a day as needed for allergies  -     XR chest pa & lateral; Future  -     Covid/Flu- Office Collect    Strain of right trapezius muscle, initial encounter  -     methocarbamol (Robaxin-750) 750 mg tablet; Take 1 tablet (750 mg total) by mouth daily at bedtime as needed for muscle spasms          Subjective:      Patient ID: Silvia Husbands is a 60 y o  male  Patient started 8 days ago with dry nonproductive cough  He admits to nasal congestion and postnasal drainage  Patient denies fever, chills or sweats  He has treated this with Mucinex and Robitussin DM without significant relief  Patient also complains of right sided posterior shoulder pain  He denies any specific injury or fall  Cough  This is a recurrent problem  The current episode started 1 to 4 weeks ago  The problem has been waxing and waning  The problem occurs hourly  The cough is non-productive  Associated symptoms include nasal congestion, postnasal drip and rhinorrhea   Pertinent negatives include no chest pain, chills, ear congestion, ear pain, fever, headaches, heartburn, hemoptysis, myalgias, rash, sore throat, shortness of breath, sweats, weight loss or wheezing  The symptoms are aggravated by lying down  He has tried OTC cough suppressant for the symptoms  The treatment provided mild relief  There is no history of asthma, COPD or pneumonia  The following portions of the patient's history were reviewed and updated as appropriate: allergies, current medications, past family history, past medical history, past social history, past surgical history and problem list     Review of Systems   Constitutional: Negative for chills, fever and weight loss  HENT: Positive for postnasal drip and rhinorrhea  Negative for ear pain and sore throat  Respiratory: Positive for cough  Negative for hemoptysis, shortness of breath and wheezing  Cardiovascular: Negative for chest pain  Gastrointestinal: Negative for heartburn  Musculoskeletal: Negative for myalgias  Skin: Negative for rash  Neurological: Negative for headaches  Objective:      /80   Pulse 84   Temp 97 6 °F (36 4 °C) (Skin)   Resp 16   Ht 5' 11" (1 803 m)   Wt 99 8 kg (220 lb)   SpO2 96%   BMI 30 68 kg/m²          Physical Exam  Constitutional:       General: He is not in acute distress  Appearance: Normal appearance  He is not ill-appearing, toxic-appearing or diaphoretic  HENT:      Head: Normocephalic  Right Ear: Tympanic membrane normal       Left Ear: Tympanic membrane normal       Nose: Congestion present  Mouth/Throat:      Mouth: Mucous membranes are moist       Pharynx: Oropharynx is clear  Eyes:      General: No scleral icterus  Conjunctiva/sclera: Conjunctivae normal    Cardiovascular:      Rate and Rhythm: Normal rate and regular rhythm  Pulmonary:      Effort: Pulmonary effort is normal  No respiratory distress  Breath sounds: Normal breath sounds  No wheezing  Abdominal:      Palpations: Abdomen is soft  Tenderness: There is no abdominal tenderness  Musculoskeletal:         General: Tenderness present        Cervical back: Normal range of motion and neck supple  Right lower leg: No edema  Left lower leg: No edema  Comments: Tenderness right trapezius muscle  Right shoulder range of motion intact nontender  Lymphadenopathy:      Cervical: No cervical adenopathy  Skin:     General: Skin is warm and dry  Neurological:      General: No focal deficit present  Mental Status: He is alert and oriented to person, place, and time  Psychiatric:         Mood and Affect: Mood normal          Behavior: Behavior normal          Thought Content: Thought content normal          Judgment: Judgment normal          BMI Counseling: Body mass index is 30 68 kg/m²  The BMI is above normal  Nutrition recommendations include reducing portion sizes, decreasing overall calorie intake, 3-5 servings of fruits/vegetables daily, reducing fast food intake, consuming healthier snacks, decreasing soda and/or juice intake, moderation in carbohydrate intake, increasing intake of lean protein, reducing intake of saturated fat and trans fat and reducing intake of cholesterol  Exercise recommendations include moderate aerobic physical activity for 150 minutes/week

## 2022-06-01 LAB
FLUAV RNA RESP QL NAA+PROBE: NEGATIVE
FLUBV RNA RESP QL NAA+PROBE: NEGATIVE
SARS-COV-2 RNA RESP QL NAA+PROBE: NEGATIVE

## 2022-07-21 ENCOUNTER — OFFICE VISIT (OUTPATIENT)
Dept: FAMILY MEDICINE CLINIC | Facility: CLINIC | Age: 62
End: 2022-07-21
Payer: COMMERCIAL

## 2022-07-21 VITALS
WEIGHT: 218 LBS | BODY MASS INDEX: 30.52 KG/M2 | TEMPERATURE: 97.4 F | RESPIRATION RATE: 16 BRPM | OXYGEN SATURATION: 97 % | SYSTOLIC BLOOD PRESSURE: 130 MMHG | HEART RATE: 76 BPM | DIASTOLIC BLOOD PRESSURE: 74 MMHG | HEIGHT: 71 IN

## 2022-07-21 DIAGNOSIS — E55.9 VITAMIN D DEFICIENCY: ICD-10-CM

## 2022-07-21 DIAGNOSIS — K21.9 GASTROESOPHAGEAL REFLUX DISEASE WITHOUT ESOPHAGITIS: ICD-10-CM

## 2022-07-21 DIAGNOSIS — U09.9 POST-COVID SYNDROME: ICD-10-CM

## 2022-07-21 DIAGNOSIS — Z00.00 ANNUAL PHYSICAL EXAM: Primary | ICD-10-CM

## 2022-07-21 DIAGNOSIS — E78.2 MIXED HYPERLIPIDEMIA: ICD-10-CM

## 2022-07-21 DIAGNOSIS — I10 PRIMARY HYPERTENSION: ICD-10-CM

## 2022-07-21 DIAGNOSIS — N52.9 ERECTILE DYSFUNCTION, UNSPECIFIED ERECTILE DYSFUNCTION TYPE: ICD-10-CM

## 2022-07-21 DIAGNOSIS — J30.9 ALLERGIC RHINITIS, UNSPECIFIED SEASONALITY, UNSPECIFIED TRIGGER: ICD-10-CM

## 2022-07-21 DIAGNOSIS — E23.0 HYPOGONADOTROPIC HYPOGONADISM (HCC): ICD-10-CM

## 2022-07-21 DIAGNOSIS — Z12.11 SCREENING FOR COLON CANCER: ICD-10-CM

## 2022-07-21 DIAGNOSIS — G47.33 OSA (OBSTRUCTIVE SLEEP APNEA): ICD-10-CM

## 2022-07-21 PROCEDURE — 99396 PREV VISIT EST AGE 40-64: CPT | Performed by: FAMILY MEDICINE

## 2022-07-21 NOTE — PROGRESS NOTES
Assessment/Plan:  Patient to complete fasting labs as previously ordered and testosterone level  Patient to continue present treatment  Patient instructed to follow a low-fat, low-salt and a low-carbohydrate diet and get regular aerobic exercise 150 minutes per week  Patient is being referred to [address] [place] [address] Urology regarding rectal dysfunction and low testosterone  Patient is being referred back to Dr Glenn Zendejas at  GI for screening colonoscopy  Return to the office in 6 months  Diagnoses and all orders for this visit:    Annual physical exam    Primary hypertension    Mixed hyperlipidemia    Gastroesophageal reflux disease without esophagitis    WALDO (obstructive sleep apnea)    Allergic rhinitis, unspecified seasonality, unspecified trigger    Hypogonadotropic hypogonadism ([facility] )  -     Ambulatory Referral to Urology; Future  -     Testosterone, free, total; Future    Erectile dysfunction, unspecified erectile dysfunction type  -     Ambulatory Referral to Urology; Future  -     Testosterone, free, total; Future    Post-COVID syndrome    Vitamin D deficiency    Screening for colon cancer  -     Ambulatory Referral to Gastroenterology; Future          Subjective:      Patient ID: Hipolito Taylor is a 60 y o  male  Patient is here for annual physical exam for his health insurance plan and follow-up of chronic conditions  Patient has been feeling fairly well overall  He continues to exercise with weight training and cardio  He admits to ongoing fatigue and difficulty with concentration since COVID-19 infection December 2020  Patient complains of chronic problems with erectile dysfunction not improved with Viagra or Cialis  Patient had seen [address] [place] [address] Endocrinology in 2015 diagnosed with low testosterone but no treatment initiated that time  Patient is overdue for follow-up colonoscopy and plans to schedule with Dr Glenn Zendejas at  GI  Patient uses CPAP nightly        Hypertension  This is a chronic problem  The problem is controlled  Pertinent negatives include no anxiety, blurred vision, chest pain, headaches, neck pain, orthopnea, palpitations, peripheral edema, PND or shortness of breath  Risk factors for coronary artery disease include family history, dyslipidemia, male gender and obesity  Past treatments include ACE inhibitors  The current treatment provides significant improvement  There are no compliance problems  There is no history of CAD/MI or CVA  The following portions of the patient's history were reviewed and updated as appropriate: allergies, current medications, past family history, past medical history, past social history, past surgical history and problem list     Review of Systems   Constitutional: Positive for fatigue  Negative for activity change, appetite change, chills, diaphoresis, fever and unexpected weight change  HENT: Positive for congestion, postnasal drip, rhinorrhea, sinus pressure and sneezing  Negative for ear pain, sinus pain and sore throat  Eyes: Negative  Negative for blurred vision  Respiratory: Negative for cough, chest tightness, shortness of breath and wheezing  Cardiovascular: Negative for chest pain, palpitations, orthopnea, leg swelling and PND  Gastrointestinal: Negative for abdominal pain, blood in stool, constipation, diarrhea, nausea and vomiting  Endocrine: Negative for cold intolerance and heat intolerance  Genitourinary: Negative for difficulty urinating, dysuria, frequency, hematuria and urgency  Musculoskeletal: Positive for arthralgias and neck stiffness  Negative for back pain, gait problem, joint swelling, myalgias and neck pain  Skin: Negative  Neurological: Negative for dizziness, syncope, weakness, light-headedness and headaches  Hematological: Negative for adenopathy  Does not bruise/bleed easily  Psychiatric/Behavioral: Positive for decreased concentration   Negative for confusion, dysphoric mood and sleep disturbance  The patient is not nervous/anxious  Objective:      /74   Pulse 76   Temp (!) 97 4 °F (36 3 °C) (Skin)   Resp 16   Ht 5' 11" (1 803 m)   Wt 98 9 kg (218 lb)   SpO2 97%   BMI 30 40 kg/m²          Physical Exam  Constitutional:       General: He is not in acute distress  Appearance: Normal appearance  HENT:      Head: Normocephalic  Mouth/Throat:      Mouth: Mucous membranes are moist    Eyes:      General: No scleral icterus  Conjunctiva/sclera: Conjunctivae normal    Neck:      Vascular: No carotid bruit  Cardiovascular:      Rate and Rhythm: Normal rate and regular rhythm  Pulmonary:      Effort: Pulmonary effort is normal       Breath sounds: Normal breath sounds  Abdominal:      Palpations: Abdomen is soft  Tenderness: There is no abdominal tenderness  Musculoskeletal:      Cervical back: Neck supple  Right lower leg: No edema  Left lower leg: No edema  Lymphadenopathy:      Cervical: No cervical adenopathy  Skin:     General: Skin is warm and dry  Neurological:      General: No focal deficit present  Mental Status: He is alert and oriented to person, place, and time  Psychiatric:         Mood and Affect: Mood normal          Behavior: Behavior normal          Thought Content:  Thought content normal          Judgment: Judgment normal

## 2022-07-25 ENCOUNTER — APPOINTMENT (OUTPATIENT)
Dept: LAB | Facility: MEDICAL CENTER | Age: 62
End: 2022-07-25
Payer: COMMERCIAL

## 2022-07-25 DIAGNOSIS — I10 PRIMARY HYPERTENSION: ICD-10-CM

## 2022-07-25 DIAGNOSIS — E55.9 VITAMIN D DEFICIENCY: ICD-10-CM

## 2022-07-25 DIAGNOSIS — K21.9 GASTROESOPHAGEAL REFLUX DISEASE WITHOUT ESOPHAGITIS: ICD-10-CM

## 2022-07-25 DIAGNOSIS — I10 ESSENTIAL HYPERTENSION: ICD-10-CM

## 2022-07-25 DIAGNOSIS — E23.0 HYPOGONADOTROPIC HYPOGONADISM (HCC): ICD-10-CM

## 2022-07-25 DIAGNOSIS — E78.5 HYPERLIPIDEMIA, UNSPECIFIED HYPERLIPIDEMIA TYPE: ICD-10-CM

## 2022-07-25 DIAGNOSIS — N52.9 ERECTILE DYSFUNCTION, UNSPECIFIED ERECTILE DYSFUNCTION TYPE: ICD-10-CM

## 2022-07-25 DIAGNOSIS — Z12.5 SCREENING PSA (PROSTATE SPECIFIC ANTIGEN): ICD-10-CM

## 2022-07-25 DIAGNOSIS — E78.2 MIXED HYPERLIPIDEMIA: ICD-10-CM

## 2022-07-25 LAB
25(OH)D3 SERPL-MCNC: 64.3 NG/ML (ref 30–100)
ALBUMIN SERPL BCP-MCNC: 4 G/DL (ref 3.5–5)
ALP SERPL-CCNC: 75 U/L (ref 46–116)
ALT SERPL W P-5'-P-CCNC: 38 U/L (ref 12–78)
ANION GAP SERPL CALCULATED.3IONS-SCNC: 6 MMOL/L (ref 4–13)
AST SERPL W P-5'-P-CCNC: 25 U/L (ref 5–45)
BILIRUB SERPL-MCNC: 0.62 MG/DL (ref 0.2–1)
BILIRUB UR QL STRIP: NEGATIVE
BUN SERPL-MCNC: 17 MG/DL (ref 5–25)
CALCIUM SERPL-MCNC: 9.6 MG/DL (ref 8.3–10.1)
CHLORIDE SERPL-SCNC: 106 MMOL/L (ref 96–108)
CHOLEST SERPL-MCNC: 149 MG/DL
CLARITY UR: CLEAR
CO2 SERPL-SCNC: 25 MMOL/L (ref 21–32)
COLOR UR: NORMAL
CREAT SERPL-MCNC: 1.09 MG/DL (ref 0.6–1.3)
ERYTHROCYTE [DISTWIDTH] IN BLOOD BY AUTOMATED COUNT: 13.7 % (ref 11.6–15.1)
GFR SERPL CREATININE-BSD FRML MDRD: 71 ML/MIN/1.73SQ M
GLUCOSE P FAST SERPL-MCNC: 94 MG/DL (ref 65–99)
GLUCOSE UR STRIP-MCNC: NEGATIVE MG/DL
HCT VFR BLD AUTO: 41.8 % (ref 36.5–49.3)
HDLC SERPL-MCNC: 53 MG/DL
HGB BLD-MCNC: 13.2 G/DL (ref 12–17)
HGB UR QL STRIP.AUTO: NEGATIVE
KETONES UR STRIP-MCNC: NEGATIVE MG/DL
LDLC SERPL CALC-MCNC: 69 MG/DL (ref 0–100)
LEUKOCYTE ESTERASE UR QL STRIP: NEGATIVE
MCH RBC QN AUTO: 28.6 PG (ref 26.8–34.3)
MCHC RBC AUTO-ENTMCNC: 31.6 G/DL (ref 31.4–37.4)
MCV RBC AUTO: 91 FL (ref 82–98)
NITRITE UR QL STRIP: NEGATIVE
NONHDLC SERPL-MCNC: 96 MG/DL
PH UR STRIP.AUTO: 5.5 [PH]
PLATELET # BLD AUTO: 423 THOUSANDS/UL (ref 149–390)
PMV BLD AUTO: 9.5 FL (ref 8.9–12.7)
POTASSIUM SERPL-SCNC: 4.3 MMOL/L (ref 3.5–5.3)
PROT SERPL-MCNC: 8.1 G/DL (ref 6.4–8.4)
PROT UR STRIP-MCNC: NEGATIVE MG/DL
PSA SERPL-MCNC: 0.6 NG/ML (ref 0–4)
RBC # BLD AUTO: 4.61 MILLION/UL (ref 3.88–5.62)
SODIUM SERPL-SCNC: 137 MMOL/L (ref 135–147)
SP GR UR STRIP.AUTO: 1.01 (ref 1–1.03)
TRIGL SERPL-MCNC: 137 MG/DL
TSH SERPL DL<=0.05 MIU/L-ACNC: 2.81 UIU/ML (ref 0.45–4.5)
UROBILINOGEN UR STRIP-ACNC: <2 MG/DL
WBC # BLD AUTO: 5.76 THOUSAND/UL (ref 4.31–10.16)

## 2022-07-25 PROCEDURE — 84403 ASSAY OF TOTAL TESTOSTERONE: CPT

## 2022-07-25 PROCEDURE — 81003 URINALYSIS AUTO W/O SCOPE: CPT

## 2022-07-25 PROCEDURE — 84402 ASSAY OF FREE TESTOSTERONE: CPT

## 2022-07-25 PROCEDURE — 85027 COMPLETE CBC AUTOMATED: CPT

## 2022-07-25 PROCEDURE — 82306 VITAMIN D 25 HYDROXY: CPT

## 2022-07-25 PROCEDURE — 80061 LIPID PANEL: CPT

## 2022-07-25 PROCEDURE — 84443 ASSAY THYROID STIM HORMONE: CPT

## 2022-07-25 PROCEDURE — 36415 COLL VENOUS BLD VENIPUNCTURE: CPT

## 2022-07-25 PROCEDURE — G0103 PSA SCREENING: HCPCS

## 2022-07-25 PROCEDURE — 80053 COMPREHEN METABOLIC PANEL: CPT

## 2022-07-25 RX ORDER — LISINOPRIL 20 MG/1
TABLET ORAL
Qty: 90 TABLET | Refills: 1 | Status: SHIPPED | OUTPATIENT
Start: 2022-07-25

## 2022-07-25 RX ORDER — SIMVASTATIN 40 MG
TABLET ORAL
Qty: 90 TABLET | Refills: 3 | Status: SHIPPED | OUTPATIENT
Start: 2022-07-25

## 2022-07-25 RX ORDER — OMEPRAZOLE 20 MG/1
CAPSULE, DELAYED RELEASE ORAL
Qty: 90 CAPSULE | Refills: 3 | Status: SHIPPED | OUTPATIENT
Start: 2022-07-25

## 2022-07-26 LAB
TESTOST FREE SERPL-MCNC: 22.3 PG/ML (ref 6.6–18.1)
TESTOST SERPL-MCNC: 322 NG/DL (ref 264–916)

## 2022-08-15 NOTE — H&P (VIEW-ONLY)
Assessment/Plan:  Based upon the history given and the current physical findings, Deviated nasal septum with left sided obstruction compounded by inferior turbinate hypertrophy and cycling effect are etiology of nasal obstruction and intolerance to CPAP  I have recommended that the patient undergo septoplasty with bilateral submucosal reduction/resection of inferior nasal turbinates with outfracture  All risks, benefits, alternatives, and complications have been reviewed in detail  The risks of the surgery include but are not limited to:  bleeding, infection, need for further surgery, continued septal deformity, nasal congestion, septal hematoma (collection of blood between septal flaps), and septal perforation (may cause whistling and bleeding)  The patient  understands and accepts all risks of the surgery  Pre-operative labs have been ordered  Medical clearance not necessary  Post-operative medications have been prescribed and the patient  has been instructed to fill the medication in preparation for the surgery  Post operative instructions have been reviewed and a copy has been given to the patient  A post operative appointment has been made for the patient  If any questions should arise prior to or after the surgery, the patient  should call my office and I will be glad to discuss any questions or concerns with them  Once healed from surgery, CPAP therapy can resume with trial of nasal pillows  Diagnoses and all orders for this visit:    Deviated nasal septum  -     oxyCODONE-acetaminophen (Percocet) 5-325 mg per tablet; Take 1 tablet by mouth every 4 (four) hours as needed for moderate pain for up to 10 days Max Daily Amount: 6 tablets  -     cephalexin (KEFLEX) 500 mg capsule; Take 1 capsule (500 mg total) by mouth 2 (two) times a day for 10 days    Hypertrophy of both inferior nasal turbinates  -     oxyCODONE-acetaminophen (Percocet) 5-325 mg per tablet;  Take 1 tablet by mouth every 4 (four) hours as needed for moderate pain for up to 10 days Max Daily Amount: 6 tablets    Obstructive sleep apnea      Encounter Diagnosis     ICD-10-CM    1  Deviated nasal septum  J34 2 oxyCODONE-acetaminophen (Percocet) 5-325 mg per tablet     cephalexin (KEFLEX) 500 mg capsule   2  Hypertrophy of both inferior nasal turbinates  J34 3 oxyCODONE-acetaminophen (Percocet) 5-325 mg per tablet   3  Obstructive sleep apnea  G47 33             Patient ID: Leigh Carroll is a 60 y o  male  HPI  Patient is a 58-year-old male who reports chronic nasal congestion with a history of obstructive sleep apnea  Patient has a history of mild obstructive sleep apnea with an AHI of 8 7 events per hour  He has a history of COVID infection in December 2020  Patient is followed by Pulmonary noting January 2022 pulmonary evaluation with patient encouraged to use CPAP more than 4 hours per night  Patient has trial Flonase as well as saline nasal wash with persistent nasal congestion and sinus pressure  Patient referred to ENT for chronic nasal congestion  He reports nasal congestion forces him to remove mask nightly  He is unable to wear CPAP - he presents for evaluation of nasal blockage  He reports seasonal allergies - symptoms in the spring or fall; began over the last 20 years - never formally tested  He reports recurrent sinus infections; requires abx up to twice yearly  Last course of abx March  In preparation for this visit;all prior office notes, prior consultations, culture results, labs, and diagnostic testing were personally reviewed   A total of 7 minutes was spent reviewing all of this information     The following portions of the patient's history were reviewed and updated as appropriate: allergies, current medications, past family history, past medical history, past social history, past surgical history and problem list       Past Medical History:   Diagnosis Date    Hypogonadotropic hypogonadism ([facility] ) Past Surgical History:   Procedure Laterality Date    TONSILLECTOMY      WISDOM TOOTH EXTRACTION         Social History     Tobacco Use    Smoking status: Never Smoker    Smokeless tobacco: Current User     Types: Chew    Tobacco comment: off and on not every day   Vaping Use    Vaping Use: Never used   Substance Use Topics    Alcohol use: Yes     Alcohol/week: 4 0 standard drinks     Types: 4 Standard drinks or equivalent per week    Drug use: No       Current Outpatient Medications on File Prior to Visit   Medication Sig Dispense Refill    b complex vitamins capsule Take by mouth      Cholecalciferol (VITAMIN D3) 5000 units CAPS Take 1 capsule by mouth daily        doxepin (SINEquan) 10 mg capsule Take 1 capsule (10 mg total) by mouth daily at bedtime 30 capsule 0    lisinopril (ZESTRIL) 20 mg tablet TAKE 1 TABLET DAILY 90 tablet 1    Omega-3 Fatty Acids (FISH OIL) 1200 MG CAPS by Does not apply route      omeprazole (PriLOSEC) 20 mg delayed release capsule TAKE 1 CAPSULE DAILY 90 capsule 3    Psyllium (METAMUCIL FIBER SINGLES PO) Take by mouth      sildenafil (VIAGRA) 100 mg tablet TAKE 1 TABLET AS NEEDED FORERECTILE DYSFUNCTION 18 tablet 3    simvastatin (ZOCOR) 40 mg tablet TAKE 1 TABLET DAILY 90 tablet 3    tadalafil (CIALIS) 20 MG tablet Take 1 tablet (20 mg total) by mouth daily as needed for erectile dysfunction 18 tablet 0    fluticasone (FLONASE) 50 mcg/act nasal spray 2 sprays into each nostril daily (Patient not taking: No sig reported)      methocarbamol (Robaxin-750) 750 mg tablet Take 1 tablet (750 mg total) by mouth daily at bedtime as needed for muscle spasms (Patient not taking: No sig reported) 30 tablet 0     No current facility-administered medications on file prior to visit  Allergies   Allergen Reactions    Moxifloxacin      Causes low BP           Review of Systems   Constitutional: Negative for chills and fever     HENT: Positive for congestion, postnasal drip, rhinorrhea, sinus pressure and sneezing  Negative for ear pain and sore throat  Eyes: Negative for pain and visual disturbance  Respiratory: Negative for cough and shortness of breath  Cardiovascular: Negative for chest pain and palpitations  Gastrointestinal: Negative for abdominal pain and vomiting  Genitourinary: Negative for dysuria and hematuria  Musculoskeletal: Positive for arthralgias and neck stiffness  Negative for back pain and neck pain  Skin: Negative for color change and rash  Neurological: Negative for seizures and syncope  Hematological: Negative for adenopathy  All other systems reviewed and are negative  /80   Pulse 84   Ht 5' 11" (1 803 m)   Wt 99 8 kg (220 lb)   BMI 30 68 kg/m²       PHYSICAL  EXAMINATION    CONSTITUTION:    Appears appropriate for age  No evidence of any acute distress  Communicates normally  Voice quality is clear  Alert and oriented  HEAD/FACE:    Atraumatic, normocephalic on inspection  No scars present  Salivary glands are normal in texture and size without any asymmetry  Facial nerve function is symmetric and normal     EYES:    Extraocular muscles intact in both eyes, normal gaze bilaterally and no evidence of nystagmus  Pupils equal, round, and accommodate to light bilaterally  EARS:    External ears normal     External canals are clear and dry  Tympanic membranes intact with normal mobility, no effusion, no retraction, no perforation  Post auricular area is normal    NOSE:    External nose without deformity  Internal mucosa pink and moist     Septum deviated  Left with obstruction  Inferior nasal turbinates normal in color, edematous and  hypertrophic bilaterally    ORAL CAVITY:    Lips normal and healthy in appearance  Dentition normal in good repair  Gums healthy, pink and moist     Tongue appears pink and moist with no lesions  Floor of mouth pink, moist, and smooth  Submandibular ducts patent with clear saliva  Parotid ducts patent with clear saliva  Oral mucosa pink and moist     Hard palate normal in appearance without any lesions  OROPHARYNX:    Soft palate pink and moist without any lesions, Mallampati III  Uvula midline without any lesions  Tonsils absent bilaterally  Posterior pharynx pink and moist without any lesions    NECK:    Supple and symmetric  No masses noted  Trachea midline  No thyromegaly or nodules noted  LYMPH:    No palpable adenopathy in left or right neck    SKIN:    No rashes  No lesions noted  Lungs:  Clear to auscultation bilaterally; no rales, rhonchi or wheezing in all lung fields    CV:  Regular rate and rhythm    ABDOMEN:   Soft, nontender, BS X4    Nasal Endoscopy (non-operated)     Because only the anterior portion of the turbinates and anterior nasal septum could be visualized, it was elected to proceed with nasal endoscopy for visualization of the posterior nasal chamber, middle meatal area, sphenoid recess and nasopharynx  Verbal consent was obtained from the patient / parent  The nasal cavity was sprayed with a solution of Phenylephrine:Lidocaine (1:1)  After waiting an appropriate amount of time for the medication to take effect the procedure was completed with the flexible endoscope  Findings are as follows:    Floor of Nose:  smooth mucosa without any mass or obstruction    Septum: deviated/obstrucvted left without any perforation    Inferior turbinates:  hypertrophic in size, pink, moist, no abnormalities    Inferior meatus: Normal appearing moist mucosa with no lesions bilaterally    Middle turbinates:  Normal in size, pink, moist, no abnormalities    Superior turbinates: Normal in size, pink, moist, no abnormalities    Superior meatus: Normal appearing moist mucosa with no lesions bilaterally    Middle meatus And ostiomeatal unit:  Normal mucosa without any pus, polyps        Sphenoid os: Normal mucosa without mucopus or polyps    Sphenoethmoid recess: pink, moist with no polyps or abnormalities bilaterally    Nasopharynx:  No masses  Smooth pink mucosa    Eustachian tube orifice:  Pink mucosa, patent, no obstruction  Patient tolerated procedure without difficulty  Patient instructed to avoid eating/drinking ×30 minutes or until anesthetic effect completely dissipated

## 2022-08-17 ENCOUNTER — CLINICAL SUPPORT (OUTPATIENT)
Dept: URGENT CARE | Facility: MEDICAL CENTER | Age: 62
End: 2022-08-17
Payer: COMMERCIAL

## 2022-08-17 ENCOUNTER — LAB (OUTPATIENT)
Dept: LAB | Facility: MEDICAL CENTER | Age: 62
End: 2022-08-17
Payer: COMMERCIAL

## 2022-08-17 ENCOUNTER — APPOINTMENT (OUTPATIENT)
Dept: RADIOLOGY | Facility: MEDICAL CENTER | Age: 62
End: 2022-08-17
Payer: COMMERCIAL

## 2022-08-17 DIAGNOSIS — Z01.812 PRE-OPERATIVE LABORATORY EXAMINATION: ICD-10-CM

## 2022-08-17 DIAGNOSIS — J34.3 HYPERTROPHY OF BOTH INFERIOR NASAL TURBINATES: ICD-10-CM

## 2022-08-17 DIAGNOSIS — Z01.818 PREOPERATIVE TESTING: ICD-10-CM

## 2022-08-17 DIAGNOSIS — J34.2 DEVIATED NASAL SEPTUM: ICD-10-CM

## 2022-08-17 DIAGNOSIS — G47.33 OBSTRUCTIVE SLEEP APNEA: ICD-10-CM

## 2022-08-17 LAB
APTT PPP: 29 SECONDS (ref 23–37)
ATRIAL RATE: 72 BPM
INR PPP: 0.94 (ref 0.84–1.19)
P AXIS: 9 DEGREES
PR INTERVAL: 164 MS
PROTHROMBIN TIME: 12.8 SECONDS (ref 11.6–14.5)
QRS AXIS: 24 DEGREES
QRSD INTERVAL: 84 MS
QT INTERVAL: 374 MS
QTC INTERVAL: 409 MS
T WAVE AXIS: 53 DEGREES
VENTRICULAR RATE: 72 BPM

## 2022-08-17 PROCEDURE — 36415 COLL VENOUS BLD VENIPUNCTURE: CPT

## 2022-08-17 PROCEDURE — 85610 PROTHROMBIN TIME: CPT

## 2022-08-17 PROCEDURE — 85730 THROMBOPLASTIN TIME PARTIAL: CPT

## 2022-08-17 PROCEDURE — 93010 ELECTROCARDIOGRAM REPORT: CPT | Performed by: INTERNAL MEDICINE

## 2022-08-17 PROCEDURE — 71046 X-RAY EXAM CHEST 2 VIEWS: CPT

## 2022-08-17 PROCEDURE — 93005 ELECTROCARDIOGRAM TRACING: CPT

## 2022-08-25 ENCOUNTER — OFFICE VISIT (OUTPATIENT)
Dept: UROLOGY | Facility: CLINIC | Age: 62
End: 2022-08-25
Payer: COMMERCIAL

## 2022-08-25 VITALS
BODY MASS INDEX: 30.66 KG/M2 | DIASTOLIC BLOOD PRESSURE: 78 MMHG | HEART RATE: 82 BPM | SYSTOLIC BLOOD PRESSURE: 130 MMHG | WEIGHT: 219 LBS | OXYGEN SATURATION: 98 % | HEIGHT: 71 IN

## 2022-08-25 DIAGNOSIS — E23.0 HYPOGONADOTROPIC HYPOGONADISM (HCC): ICD-10-CM

## 2022-08-25 DIAGNOSIS — N52.9 ERECTILE DYSFUNCTION, UNSPECIFIED ERECTILE DYSFUNCTION TYPE: ICD-10-CM

## 2022-08-25 PROCEDURE — 99202 OFFICE O/P NEW SF 15 MIN: CPT | Performed by: NURSE PRACTITIONER

## 2022-08-25 NOTE — ASSESSMENT & PLAN NOTE
· Previously followed with Dr Gustabo Goltz  · Repeat testosterone level, LH, FSH, prolactin  · Testosterone 322, free testosterone elevated at 22 3  · Referral to endocrinology for further management-he would like to find a endocrinologist closer to home

## 2022-08-25 NOTE — PATIENT INSTRUCTIONS
Erectile Dysfunction   WHAT YOU NEED TO KNOW:   What is erectile dysfunction (ED)?  ED, or impotence, is when you cannot get or keep an erection for sexual activity  What causes ED? Conditions that lead to nerve problems, such as a spinal cord injury, diabetes, or stroke    Hormone imbalances, such as low testosterone, high prolactin, or a thyroid disorder    Medical conditions that decrease blood flow, such as high blood pressure and arteriosclerosis    Multiple sclerosis or Parkinson disease    Medicines, such as those used to treat depression and high blood pressure    Injury to the testicles from radiation therapy to the testicles    What increases my risk for ED? Obesity    Diabetes that is not controlled or heart disease    Smoking, or drug or alcohol abuse    An enlarged prostate    Increasing age    Spine or groin surgeries    Stress, depression, relationship problems, and anxiety    How is ED diagnosed? Your healthcare provider will ask about your symptoms, medical history, and medicines  He or she will examine your abdomen, penis, and testicles  A rectal exam may also be done to check for an enlarged prostate  Blood and urine tests are done to check for medical conditions that may have caused your ED  You may also need tests to check your blood flow and nerve function  How is ED treated? Treatment depends on the cause of your ED  You may need any of the following:  ED medicines  help you have an erection  These medicines are taken before you have sex  Follow instructions on how to use these medicines  You may have a life-threatening reaction if you mix ED medicines with medicines that contain nitrates  Medicines with nitrates include nitroglycerin and other heart medicines  Testosterone  may be given to increase the levels in your blood and improve your ED  You may need to use a skin cream or wear a patch  Testosterone is also given as an injection      Penis injections  may be done to help improve your blood flow  A vacuum device  is a tube that is placed over the penis  A hand pump is connected to the tube and acts as a vacuum  This may help increase blood flow to the penis  Therapy  may be needed to treat emotional or relationship problems that may be causing your ED  Surgery  may be recommended if other treatments do not work  Surgery includes a penile implant or prosthesis  Surgery may also be done to improve blood flow  Ask for more information about surgeries for ED  How can I decrease my risk for ED? Do not smoke  Smoking can increase your risk for ED  Nicotine and other chemicals in cigarettes and cigars can also cause lung damage  Ask your healthcare provider for information if you currently smoke and need help to quit  E-cigarettes or smokeless tobacco still contain nicotine  Control your blood sugar levels if you have diabetes  Over time, high blood sugar levels can increase your risk for ED  Limit alcohol  Men should limit alcohol to 2 drinks a day  A drink of alcohol is 12 ounces of beer, 5 ounces of wine, or 1½ ounces of liquor  Manage your medical conditions  Eat a variety of healthy foods and stay physically active  Take your medicines as directed  They can help control conditions that may cause ED  Manage stress  Learn ways to relax, such as deep breathing, meditation, and listening to music  Do not use illegal drugs  They increase your risk for ED  When should I call my doctor? You have chest pain, dizziness, or nausea after you take ED medicines or during or after sex  You have an erection for more than 4 hours after you take your ED medicine  You see blood in your urine  You have changes in your vision, headaches, or back pain after you take ED medicine  You have a painful erection  You have questions or concerns about your condition or care  CARE AGREEMENT:   You have the right to help plan your care   Learn about your health condition and how it may be treated  Discuss treatment options with your healthcare providers to decide what care you want to receive  You always have the right to refuse treatment  The above information is an  only  It is not intended as medical advice for individual conditions or treatments  Talk to your doctor, nurse or pharmacist before following any medical regimen to see if it is safe and effective for you  © Copyright [employer] 2022 Information is for End User's use only and may not be sold, redistributed or otherwise used for commercial purposes  All illustrations and images included in CareNotes® are the copyrighted property of A [employer] A M , Inc  or [facility]   Hypogonadism   WHAT YOU NEED TO KNOW:   What do I need to know about hypogonadism? Hypogonadism is a syndrome that is also called androgen deficiency  When you have hypogonadism, your body does not make enough testosterone  What increases my risk for hypogonadism? Genetics and older age    Pituitary and testicular tumors and conditions    Medicines and drug abuse    Cancer, HIV, and diabetes    Chemotherapy and radiation therapy    Kidney, liver, heart, and lung diseases    Obesity and malnutrition    Acute illness such as the mumps    What are the signs and symptoms of hypogonadism? Decrease in the number of times you have an erection     Erectile dysfunction    Decreased libido    Testes that are small or getting smaller    Decreased pubic hair     Increased sweating and hot flashes    Increase in breast size and body fat     Decrease in muscle mass and physical activity tolerance    How is hypogonadism diagnosed? Tell your healthcare provider when your symptoms started  You may need blood tests to measure the level of testosterone or other hormones in your body  You may need an MRI of your head and neck  The MRI may show if there is a problem with your pituitary gland   Your pituitary gland controls the hormones in your body  You may be given contrast liquid to help the organs show up better in the pictures  Tell the healthcare provider if you have ever had an allergic reaction to contrast liquid  Do not enter the MRI room with anything metal  Metal can cause serious injury  Tell the healthcare provider if you have any metal in or on your body  How is hypogonadism treated? Treatment depends on the cause of your hypogonadism  You may need any of the following:  Medicines  may be needed to replace the testosterone hormone in your body  Your healthcare provider may also tell you to stop taking certain medicines  Surgery or radiation therapy  may help correct a problem with the pituitary gland  When should I call my doctor? Your symptoms get worse  You have questions or concerns about your condition or care  CARE AGREEMENT:   You have the right to help plan your care  Learn about your health condition and how it may be treated  Discuss treatment options with your healthcare providers to decide what care you want to receive  You always have the right to refuse treatment  The above information is an  only  It is not intended as medical advice for individual conditions or treatments  Talk to your doctor, nurse or pharmacist before following any medical regimen to see if it is safe and effective for you  © Copyright [employer] 2022 Information is for End User's use only and may not be sold, redistributed or otherwise used for commercial purposes   All illustrations and images included in CareNotes® are the copyrighted property of A D A M , Inc  or [address] [place] [address] [employer]

## 2022-08-25 NOTE — ASSESSMENT & PLAN NOTE
· Failed Cialis 20 mg and sildenafil 100 mg  · Not interested in penile implant or IC injections  · He will call us if he changes his mind

## 2022-08-25 NOTE — PROGRESS NOTES
Assessment and plan:     Hypogonadotropic hypogonadism (HCC)  · Previously followed with Dr Gustabo Goltz  · Repeat testosterone level, LH, FSH, prolactin  · Testosterone 322, free testosterone elevated at 22 3  · Referral to endocrinology for further management-he would like to find a endocrinologist closer to home    Male erectile disorder  · Failed Cialis 20 mg and sildenafil 100 mg  · Not interested in penile implant or IC injections  · He will call us if he changes his mind      Miriam Late, CRNP    History of Present Illness     Soren Galarza is a 60 y o  new patient presents for loss of interest in sexual intercourse as well as erectile dysfunction  This is been going on for a long time  Onset- sudden per his report, however he saw Dr Gustabo Goltz in 2015 for the same  Duration: years  Obtain/Maintain: both  % erection without aid:  Alone and with partner(s):  Nocturnal/AM erections:no   Penetration keep ability: absent  Last successful intercourse: 12-18 months ago    Libido: none  Ejaculatory dysfunction:none prior  Orgasmic dysfunction: none prior  Relationship problems: denies  Performance anxiety/stress: denies  HTN/HLD/DM/Vascular/Spinal disease:htn on lisinopril  Chronic renal failure /chronic liver failure/alcoholism/ thyroid disorder: denies  Trauma ( pelvic fracture, penile fracture, perineal trauma, priapism, spinal cord injury, neuropathy from bicycle riding, pelvic radiation):denies  Antihypertensives/ antidepressants/ antipsychotics ( tricyclic antidepressants MAOI, lithium): denies  No abdominopelvic/spinal surgeries: denies  Cardiovascular risk assessment: low  Prior treatment: viagra and cialis "that does not do much of anything"  Had minimal effectiveness, if he could get an erection the base would not get engorged  we reviewed that the ability to attain and maintain an erection requires multi factors including proper blood flow and nerve innervation    We discussed the prevalence of erectile dysfunction increases with age; more than 50% of min older than 40 has some degree of erectile dysfunction  We also discussed how erectile dysfunction can be psychogenic related to depression, stress and anxiety  We discussed how certain endocrine issues can lead to erectile dysfunction such as hyperprolactinemia, thyroid disorders and hypogonadism etc  We discussed that his medications can be a factor in his inability to obtain and maintain erection  This includes antihypertensive medications  Patient understands that medical comorbidities including obesity, high blood pressure, high cholesterol, diabetes, other neurologic concerns as well as lifestyle habits including smoking and alcohol use can contribute to erectile dysfunction  We discussed phosphodiesterase 5 inhibitors including medications like Viagra, Cialis, Levitra, and generic sildenafil  We gave a brief overview of the administration instructions and side effect profile  We discussed more invasive options including urethral suppositories, penile injections, or ultimately prosthetic device implantation should all other options fail  He is not interested in these  Denies personal or family hx of prostate cancer  Component PSA, Total   Latest Ref Rng & Units 0 0 - 4 0 ng/mL   12/15/2017 0 9   12/18/2018 0 7   12/19/2019 0 8   2/8/2021 0 6   7/25/2022 0 6       He has been diagnosed with long term covid    Laboratory     Lab Results   Component Value Date    BUN 17 07/25/2022    CREATININE 1 09 07/25/2022       No components found for: GFR    Lab Results   Component Value Date    GLUCOSE 98 11/13/2015    CALCIUM 9 6 07/25/2022     11/13/2015    K 4 3 07/25/2022    CO2 25 07/25/2022     07/25/2022       Lab Results   Component Value Date    WBC 5 76 07/25/2022    HGB 13 2 07/25/2022    HCT 41 8 07/25/2022    MCV 91 07/25/2022     (H) 07/25/2022       Lab Results   Component Value Date    PSA 0 6 07/25/2022    PSA 0 6 02/08/2021    PSA 0 8 12/19/2019       No results found for this or any previous visit (from the past 1 hour(s))  @RESULT(URINEMICROSCOPIC)@    @RESULT(URINECULTURE)@    Radiology   2015  EXAM ROOM = MRI    EXAM START = [id number]   EXAM STOP = [id number]   FILMS USED =       MRI BRAIN AND SELLA  WITH AND WITHOUT CONTRAST      INDICATION- 68-year-old male, Hypopituitarism   COMPARISON- None      TECHNIQUE-   Brain- Axial T2  Axial T1 post contrast    Sella- Sagittal T1, Coronal T1 pre-and-post contrast, coronal post   contrast dynamic imaging  Coronal T2  Sagittal T1 post contrast    Targeted images of the sella were performed requiring additional time   at acquisition and interpretation of approximately 25%   10 mL of Gadavist was injected intravenously without immediate   consequence  IMAGE QUALITY-  Diagnostic  FINDINGS-      BRAIN PARENCHYMA-  There is no discrete mass, mass effect or midline   shift  No abnormal white matter signal identified  Brainstem and   cerebellum demonstrate normal signal  There is no intracranial   hemorrhage  There is no evidence of acute infarction and diffusion   imaging is unremarkable  Postcontrast imaging is normal       VENTRICLES-  Normal       SELLA AND PITUITARY GLAND-  The gland is homogeneous and normal in   size  The pituitary stalk is midline  Normal parasellar and   suprasellar structures  ORBITS-  Normal       PARANASAL SINUSES-  Mild pansinus mucosal thickening without air-fluid   levels      VASCULATURE-  Evaluation of the major intracranial vasculature   demonstrates appropriate flow voids  CALVARIUM AND SKULL BASE-  Normal       EXTRACRANIAL SOFT TISSUES-  Normal       IMPRESSION-   Mild pansinus mucosal thickening without air-fluid level      Otherwise, normal MRI examination of the brain with attention to the   pituitary gland        Review of Systems     Review of Systems   Constitutional: Positive for fatigue   Negative for activity change, appetite change, chills, fever and unexpected weight change  HENT: Negative for facial swelling  Eyes: Negative for discharge  Respiratory: Negative  Negative for cough and shortness of breath  Cardiovascular: Negative for chest pain and leg swelling  Gastrointestinal: Negative  Negative for abdominal distention, abdominal pain, constipation, diarrhea, nausea and vomiting  Endocrine: Negative  Genitourinary: Negative  Negative for decreased urine volume, difficulty urinating, dysuria, enuresis, flank pain, frequency, genital sores, hematuria, scrotal swelling, testicular pain and urgency  Musculoskeletal: Negative for back pain and myalgias  Skin: Negative for pallor and rash  Allergic/Immunologic: Negative  Negative for immunocompromised state  Neurological: Negative for facial asymmetry and speech difficulty  Psychiatric/Behavioral: Negative for agitation and confusion           AUA SYMPTOM SCORE    Flowsheet Row Most Recent Value   AUA SYMPTOM SCORE    How often have you had a sensation of not emptying your bladder completely after you finished urinating? 0 (P)     How often have you had to urinate again less than two hours after you finished urinating? 1 (P)     How often have you found you stopped and started again several times when you urinate? 0 (P)     How often have you found it difficult to postpone urination? 0 (P)     How often have you had a weak urinary stream? 1 (P)     How often have you had to push or strain to begin urination? 0 (P)     How many times did you most typically get up to urinate from the time you went to bed at night until the time you got up in the morning? 1 (P)     Quality of Life: If you were to spend the rest of your life with your urinary condition just the way it is now, how would you feel about that? 1 (P)     AUA SYMPTOM SCORE 3 (P)                 Allergies     Allergies   Allergen Reactions    Moxifloxacin      Causes low BP Physical Exam     Physical Exam  Constitutional:       General: He is not in acute distress  Appearance: Normal appearance  He is normal weight  He is not ill-appearing, toxic-appearing or diaphoretic  HENT:      Head: Normocephalic and atraumatic  Eyes:      General: No scleral icterus  Cardiovascular:      Rate and Rhythm: Normal rate  Pulmonary:      Effort: Pulmonary effort is normal  No respiratory distress  Abdominal:      General: Abdomen is flat  There is no distension  Palpations: Abdomen is soft  Tenderness: There is no abdominal tenderness  There is no guarding  Genitourinary:     Penis: Circumcised  No hypospadias, erythema, tenderness, discharge, swelling or lesions  Testes:         Right: Mass, tenderness or swelling not present  Right testis is descended  Left: Mass, tenderness or swelling not present  Left testis is descended  Epididymis:      Right: Not inflamed  No tenderness  Left: Not inflamed  No tenderness  Comments: Declines rectal exam  Musculoskeletal:         General: No swelling  Cervical back: Normal range of motion  Skin:     General: Skin is warm and dry  Coloration: Skin is not jaundiced or pale  Findings: No rash  Neurological:      General: No focal deficit present  Mental Status: He is alert and oriented to person, place, and time  Gait: Gait normal    Psychiatric:         Mood and Affect: Mood normal          Behavior: Behavior normal          Thought Content:  Thought content normal          Judgment: Judgment normal          Vital Signs     Vitals:    08/25/22 1126   BP: 130/78   BP Location: Left arm   Patient Position: Sitting   Cuff Size: Adult   Pulse: 82   SpO2: 98%   Weight: 99 3 kg (219 lb)   Height: 5' 11" (1 803 m)       Current Medications       Current Outpatient Medications:     b complex vitamins capsule, Take by mouth, Disp: , Rfl:     cephalexin (KEFLEX) 500 mg capsule, Take 1 capsule (500 mg total) by mouth 2 (two) times a day for 10 days, Disp: 20 capsule, Rfl: 0    Cholecalciferol (VITAMIN D3) 5000 units CAPS, Take 1 capsule by mouth daily  , Disp: , Rfl:     doxepin (SINEquan) 10 mg capsule, Take 1 capsule (10 mg total) by mouth daily at bedtime, Disp: 30 capsule, Rfl: 0    lisinopril (ZESTRIL) 20 mg tablet, TAKE 1 TABLET DAILY, Disp: 90 tablet, Rfl: 1    Omega-3 Fatty Acids (FISH OIL) 1200 MG CAPS, by Does not apply route, Disp: , Rfl:     omeprazole (PriLOSEC) 20 mg delayed release capsule, TAKE 1 CAPSULE DAILY, Disp: 90 capsule, Rfl: 3    oxyCODONE-acetaminophen (Percocet) 5-325 mg per tablet, Take 1 tablet by mouth every 4 (four) hours as needed for moderate pain for up to 10 days Max Daily Amount: 6 tablets, Disp: 24 tablet, Rfl: 0    Psyllium (METAMUCIL FIBER SINGLES PO), Take by mouth, Disp: , Rfl:     sildenafil (VIAGRA) 100 mg tablet, TAKE 1 TABLET AS NEEDED FORERECTILE DYSFUNCTION, Disp: 18 tablet, Rfl: 3    simvastatin (ZOCOR) 40 mg tablet, TAKE 1 TABLET DAILY, Disp: 90 tablet, Rfl: 3    tadalafil (CIALIS) 20 MG tablet, Take 1 tablet (20 mg total) by mouth daily as needed for erectile dysfunction, Disp: 18 tablet, Rfl: 0    fluticasone (FLONASE) 50 mcg/act nasal spray, 2 sprays into each nostril daily (Patient not taking: No sig reported), Disp: , Rfl:     methocarbamol (Robaxin-750) 750 mg tablet, Take 1 tablet (750 mg total) by mouth daily at bedtime as needed for muscle spasms (Patient not taking: No sig reported), Disp: 30 tablet, Rfl: 0    Active Problems     Patient Active Problem List   Diagnosis    Allergic rhinitis    Cholelithiasis    Decreased testosterone level    Esophageal reflux    Fatty liver    Gastritis    Hyperlipidemia    Hypertension    [facility]ad[facility] hypogonadism [facility] [address] )    Male erectile disorder    Vitamin D deficiency    Post-COVID syndrome    Obesity    WALDO (obstructive sleep apnea)    Fatigue    Snoring    Insomnia Past Medical History     Past Medical History:   Diagnosis Date    Hypogonadotropic hypogonadism [facility])        Surgical History     Past Surgical History:   Procedure Laterality Date    TONSILLECTOMY      WISDOM TOOTH EXTRACTION         Family History     Family History   Problem Relation Age of Onset    Hypertension Mother     Kidney failure Mother     Hypertension Father     Hypertension Brother     Heart attack Brother     Heart attack Brother         39       Social History     Social History     Social History     Tobacco Use   Smoking Status Never Smoker   Smokeless Tobacco Current User    Types: Chew   Tobacco Comment    off and on not every day       Past Surgical History:   Procedure Laterality Date    TONSILLECTOMY      WISDOM TOOTH EXTRACTION           The following portions of the patient's history were reviewed and updated as appropriate: allergies, current medications, past family history, past medical history, past social history, past surgical history and problem list    Please note :  Voice dictation software has been used to create this document  There may be inadvertent transcription errors      [address] [address]

## 2022-08-29 ENCOUNTER — APPOINTMENT (OUTPATIENT)
Dept: LAB | Facility: MEDICAL CENTER | Age: 62
End: 2022-08-29
Payer: COMMERCIAL

## 2022-08-29 DIAGNOSIS — N52.9 ERECTILE DYSFUNCTION, UNSPECIFIED ERECTILE DYSFUNCTION TYPE: ICD-10-CM

## 2022-08-29 DIAGNOSIS — E23.0 HYPOGONADOTROPIC HYPOGONADISM (HCC): ICD-10-CM

## 2022-08-29 LAB
FSH SERPL-ACNC: 3.5 MIU/ML (ref 0.7–10.8)
LH SERPL-ACNC: 4.2 MIU/ML (ref 1.2–10.6)
PROLACTIN SERPL-MCNC: 8.5 NG/ML (ref 2.5–17.4)

## 2022-08-29 PROCEDURE — 84403 ASSAY OF TOTAL TESTOSTERONE: CPT

## 2022-08-29 PROCEDURE — 36415 COLL VENOUS BLD VENIPUNCTURE: CPT

## 2022-08-29 PROCEDURE — 83001 ASSAY OF GONADOTROPIN (FSH): CPT

## 2022-08-29 PROCEDURE — 83002 ASSAY OF GONADOTROPIN (LH): CPT

## 2022-08-29 PROCEDURE — 84146 ASSAY OF PROLACTIN: CPT

## 2022-08-29 PROCEDURE — 84402 ASSAY OF FREE TESTOSTERONE: CPT

## 2022-08-30 LAB
TESTOST FREE SERPL-MCNC: 17.3 PG/ML (ref 6.6–18.1)
TESTOST SERPL-MCNC: 310 NG/DL (ref 264–916)

## 2022-08-30 RX ORDER — MULTIVITAMIN
1 CAPSULE ORAL DAILY
COMMUNITY
End: 2022-09-07

## 2022-08-30 NOTE — PRE-PROCEDURE INSTRUCTIONS
Pre-Surgery Instructions:   Medication Instructions    Cholecalciferol (VITAMIN D3) 5000 units CAPS Stop taking 7 days prior to surgery   fluticasone (FLONASE) 50 mcg/act nasal spray Take day of surgery   lisinopril (ZESTRIL) 20 mg tablet Hold day of surgery   Multiple Vitamin (multivitamin) capsule Stop taking 7 days prior to surgery   Omega-3 Fatty Acids (FISH OIL) 1200 MG CAPS Stop taking 7 days prior to surgery   omeprazole (PriLOSEC) 20 mg delayed release capsule Take day of surgery   Psyllium (METAMUCIL FIBER SINGLES PO) Hold day of surgery   sildenafil (VIAGRA) 100 mg tablet Uses PRN- DO NOT take day of surgery    simvastatin (ZOCOR) 40 mg tablet Take day of surgery   tadalafil (CIALIS) 20 MG tablet Uses PRN- DO NOT take day of surgery    Pt denies fever, sob, sore throat and cough  Pt verbalized understanding of shower and med instructions  Pt instructed to stop nsaids and supplements one week prior to surgery

## 2022-09-06 ENCOUNTER — ANESTHESIA EVENT (OUTPATIENT)
Dept: PERIOP | Facility: HOSPITAL | Age: 62
End: 2022-09-06
Payer: COMMERCIAL

## 2022-09-06 PROBLEM — G47.30 SLEEP APNEA: Status: ACTIVE | Noted: 2021-03-03

## 2022-09-06 NOTE — ANESTHESIA PREPROCEDURE EVALUATION
Procedure:  SEPTOPLASTY (N/A Nose)  BILATERAL SMR TURBINATE REDUCTION WITH OUTFRACTURE (N/A Nose)    Relevant Problems   ANESTHESIA (within normal limits)      CARDIO   (+) Hyperlipidemia   (+) Hypertension      GI/HEPATIC   (+) Esophageal reflux   (+) Fatty liver      PULMONARY   (+) WALDO (obstructive sleep apnea)      Other   (+) Obesity        Physical Exam    Airway  Comment: Full beard  Mallampati score: II  TM Distance: >3 FB  Neck ROM: full     Dental   No notable dental hx     Cardiovascular  Rhythm: regular, Rate: normal, Cardiovascular exam normal    Pulmonary  Pulmonary exam normal Breath sounds clear to auscultation,     Other Findings        Anesthesia Plan  ASA Score- 3     Anesthesia Type- general with ASA Monitors  Additional Monitors:   Airway Plan: ETT  Plan Factors-Exercise tolerance (METS): >4 METS  Chart reviewed  EKG reviewed  Existing labs reviewed  Patient summary reviewed  Patient is not a current smoker  Induction- intravenous  Postoperative Plan-     Informed Consent- Anesthetic plan and risks discussed with patient

## 2022-09-07 ENCOUNTER — ANESTHESIA (OUTPATIENT)
Dept: PERIOP | Facility: HOSPITAL | Age: 62
End: 2022-09-07
Payer: COMMERCIAL

## 2022-09-07 ENCOUNTER — HOSPITAL ENCOUNTER (OUTPATIENT)
Facility: HOSPITAL | Age: 62
Setting detail: OUTPATIENT SURGERY
Discharge: HOME | End: 2022-09-07
Attending: OTOLARYNGOLOGY | Admitting: OTOLARYNGOLOGY
Payer: COMMERCIAL

## 2022-09-07 VITALS
BODY MASS INDEX: 30.06 KG/M2 | DIASTOLIC BLOOD PRESSURE: 92 MMHG | SYSTOLIC BLOOD PRESSURE: 163 MMHG | HEIGHT: 71 IN | TEMPERATURE: 97.5 F | OXYGEN SATURATION: 93 % | RESPIRATION RATE: 18 BRPM | HEART RATE: 88 BPM | WEIGHT: 214.73 LBS

## 2022-09-07 DIAGNOSIS — Z01.812 PRE-OPERATIVE LABORATORY EXAMINATION: ICD-10-CM

## 2022-09-07 DIAGNOSIS — G47.33 OBSTRUCTIVE SLEEP APNEA: ICD-10-CM

## 2022-09-07 DIAGNOSIS — J34.2 DEVIATED NASAL SEPTUM: ICD-10-CM

## 2022-09-07 DIAGNOSIS — Z01.818 PREOPERATIVE TESTING: ICD-10-CM

## 2022-09-07 DIAGNOSIS — J34.3 HYPERTROPHY OF BOTH INFERIOR NASAL TURBINATES: ICD-10-CM

## 2022-09-07 PROCEDURE — 30520 REPAIR OF NASAL SEPTUM: CPT | Performed by: OTOLARYNGOLOGY

## 2022-09-07 PROCEDURE — 30140 RESECT INFERIOR TURBINATE: CPT | Performed by: OTOLARYNGOLOGY

## 2022-09-07 PROCEDURE — 88304 TISSUE EXAM BY PATHOLOGIST: CPT | Performed by: PATHOLOGY

## 2022-09-07 PROCEDURE — 88311 DECALCIFY TISSUE: CPT | Performed by: PATHOLOGY

## 2022-09-07 RX ORDER — ACETAMINOPHEN 325 MG/1
650 TABLET ORAL EVERY 4 HOURS PRN
Status: DISCONTINUED | OUTPATIENT
Start: 2022-09-07 | End: 2022-09-07 | Stop reason: HOSPADM

## 2022-09-07 RX ORDER — GINSENG 100 MG
CAPSULE ORAL AS NEEDED
Status: DISCONTINUED | OUTPATIENT
Start: 2022-09-07 | End: 2022-09-07 | Stop reason: HOSPADM

## 2022-09-07 RX ORDER — SODIUM CHLORIDE 9 MG/ML
125 INJECTION, SOLUTION INTRAVENOUS CONTINUOUS
Status: DISCONTINUED | OUTPATIENT
Start: 2022-09-07 | End: 2022-09-07 | Stop reason: HOSPADM

## 2022-09-07 RX ORDER — FENTANYL CITRATE 50 UG/ML
INJECTION, SOLUTION INTRAMUSCULAR; INTRAVENOUS AS NEEDED
Status: DISCONTINUED | OUTPATIENT
Start: 2022-09-07 | End: 2022-09-07

## 2022-09-07 RX ORDER — PROPOFOL 10 MG/ML
INJECTION, EMULSION INTRAVENOUS CONTINUOUS PRN
Status: DISCONTINUED | OUTPATIENT
Start: 2022-09-07 | End: 2022-09-07

## 2022-09-07 RX ORDER — LIDOCAINE HYDROCHLORIDE AND EPINEPHRINE 10; 10 MG/ML; UG/ML
INJECTION, SOLUTION INFILTRATION; PERINEURAL AS NEEDED
Status: DISCONTINUED | OUTPATIENT
Start: 2022-09-07 | End: 2022-09-07 | Stop reason: HOSPADM

## 2022-09-07 RX ORDER — ONDANSETRON 2 MG/ML
4 INJECTION INTRAMUSCULAR; INTRAVENOUS EVERY 6 HOURS PRN
Status: DISCONTINUED | OUTPATIENT
Start: 2022-09-07 | End: 2022-09-07 | Stop reason: HOSPADM

## 2022-09-07 RX ORDER — ONDANSETRON 2 MG/ML
4 INJECTION INTRAMUSCULAR; INTRAVENOUS ONCE AS NEEDED
Status: DISCONTINUED | OUTPATIENT
Start: 2022-09-07 | End: 2022-09-07 | Stop reason: HOSPADM

## 2022-09-07 RX ORDER — MIDAZOLAM HYDROCHLORIDE 2 MG/2ML
INJECTION, SOLUTION INTRAMUSCULAR; INTRAVENOUS AS NEEDED
Status: DISCONTINUED | OUTPATIENT
Start: 2022-09-07 | End: 2022-09-07

## 2022-09-07 RX ORDER — CEFAZOLIN SODIUM 2 G/50ML
2000 SOLUTION INTRAVENOUS ONCE
Status: COMPLETED | OUTPATIENT
Start: 2022-09-07 | End: 2022-09-07

## 2022-09-07 RX ORDER — DEXTROSE MONOHYDRATE AND SODIUM CHLORIDE 5; .45 G/100ML; G/100ML
125 INJECTION, SOLUTION INTRAVENOUS CONTINUOUS
Status: DISCONTINUED | OUTPATIENT
Start: 2022-09-07 | End: 2022-09-07 | Stop reason: HOSPADM

## 2022-09-07 RX ORDER — SUCCINYLCHOLINE/SOD CL,ISO/PF 100 MG/5ML
SYRINGE (ML) INTRAVENOUS AS NEEDED
Status: DISCONTINUED | OUTPATIENT
Start: 2022-09-07 | End: 2022-09-07

## 2022-09-07 RX ORDER — ONDANSETRON 2 MG/ML
INJECTION INTRAMUSCULAR; INTRAVENOUS AS NEEDED
Status: DISCONTINUED | OUTPATIENT
Start: 2022-09-07 | End: 2022-09-07

## 2022-09-07 RX ORDER — ROCURONIUM BROMIDE 10 MG/ML
INJECTION, SOLUTION INTRAVENOUS AS NEEDED
Status: DISCONTINUED | OUTPATIENT
Start: 2022-09-07 | End: 2022-09-07

## 2022-09-07 RX ORDER — FENTANYL CITRATE/PF 50 MCG/ML
25 SYRINGE (ML) INJECTION
Status: DISCONTINUED | OUTPATIENT
Start: 2022-09-07 | End: 2022-09-07 | Stop reason: HOSPADM

## 2022-09-07 RX ORDER — COCAINE HYDROCHLORIDE 40 MG/ML
SOLUTION NASAL AS NEEDED
Status: DISCONTINUED | OUTPATIENT
Start: 2022-09-07 | End: 2022-09-07 | Stop reason: HOSPADM

## 2022-09-07 RX ORDER — MAGNESIUM HYDROXIDE 1200 MG/15ML
LIQUID ORAL AS NEEDED
Status: DISCONTINUED | OUTPATIENT
Start: 2022-09-07 | End: 2022-09-07 | Stop reason: HOSPADM

## 2022-09-07 RX ORDER — PROPOFOL 10 MG/ML
INJECTION, EMULSION INTRAVENOUS AS NEEDED
Status: DISCONTINUED | OUTPATIENT
Start: 2022-09-07 | End: 2022-09-07

## 2022-09-07 RX ORDER — OXYCODONE HYDROCHLORIDE AND ACETAMINOPHEN 5; 325 MG/1; MG/1
2 TABLET ORAL EVERY 4 HOURS PRN
Status: DISCONTINUED | OUTPATIENT
Start: 2022-09-07 | End: 2022-09-07 | Stop reason: HOSPADM

## 2022-09-07 RX ORDER — DEXAMETHASONE SODIUM PHOSPHATE 10 MG/ML
INJECTION, SOLUTION INTRAMUSCULAR; INTRAVENOUS AS NEEDED
Status: DISCONTINUED | OUTPATIENT
Start: 2022-09-07 | End: 2022-09-07

## 2022-09-07 RX ADMIN — SODIUM CHLORIDE 125 ML/HR: 0.9 INJECTION, SOLUTION INTRAVENOUS at 06:30

## 2022-09-07 RX ADMIN — ROCURONIUM BROMIDE 5 MG: 50 INJECTION, SOLUTION INTRAVENOUS at 07:33

## 2022-09-07 RX ADMIN — SODIUM CHLORIDE: 0.9 INJECTION, SOLUTION INTRAVENOUS at 08:36

## 2022-09-07 RX ADMIN — Medication 100 MG: at 07:33

## 2022-09-07 RX ADMIN — FENTANYL CITRATE 100 MCG: 50 INJECTION INTRAMUSCULAR; INTRAVENOUS at 07:33

## 2022-09-07 RX ADMIN — LIDOCAINE HYDROCHLORIDE 100 MG: 20 INJECTION INTRAVENOUS at 07:33

## 2022-09-07 RX ADMIN — CEFAZOLIN SODIUM 2000 MG: 2 SOLUTION INTRAVENOUS at 07:30

## 2022-09-07 RX ADMIN — ONDANSETRON 4 MG: 2 INJECTION INTRAMUSCULAR; INTRAVENOUS at 08:36

## 2022-09-07 RX ADMIN — SODIUM CHLORIDE 0.18 MCG/KG/MIN: 9 INJECTION, SOLUTION INTRAVENOUS at 07:33

## 2022-09-07 RX ADMIN — PROPOFOL 120 MCG/KG/MIN: 10 INJECTION, EMULSION INTRAVENOUS at 07:35

## 2022-09-07 RX ADMIN — MIDAZOLAM 2 MG: 1 INJECTION INTRAMUSCULAR; INTRAVENOUS at 07:29

## 2022-09-07 RX ADMIN — DEXAMETHASONE SODIUM PHOSPHATE 10 MG: 10 INJECTION, SOLUTION INTRAMUSCULAR; INTRAVENOUS at 07:33

## 2022-09-07 RX ADMIN — PROPOFOL 200 MG: 10 INJECTION, EMULSION INTRAVENOUS at 07:33

## 2022-09-07 NOTE — DISCHARGE INSTRUCTIONS
[facility]    POST OPERATIVE SEPTOPLASTY/TURBINATE REDUCTION SURGERY INTRUCTIONS      1  Change drip pad under the nose as needed for bleeding  2  Keep head of bed elevated  Sleep on at least a few pillows at night  3  Expect and do not become concerned about not being able to breathe through your nose  You will be a mouth-breather for approximately one week  4  You may cleanse crusting from the anterior portion of your nose with hydrogen peroxide and Q-tips  You may use Ocean nasal spray throughout the day to prevent crusting inside nasal cavity and splints  5  Begin using sinus rinse two times daily  If you have nasal splints in place you may start after the nasal splints are removed  6  Do not blow your nose until exactly two weeks after surgery  7  If you must sneeze, sneeze with mouth open  8  Avoid any strenuous activity, exercise, bending, or lifting, until approved by your surgeon  9  If there is significant bleeding, you may use over-the-counter Afrin  Place 2 sprays into the bleeding nostril every 5 minutes up to 3 consecutive times  If bleeding does not stop, call our office at [phone] or [phone] or go directly to the emergency room  10  If you have severe pain which is uncontrolled by medication or a fever greater than 101°F, notify our office immediately at [phone] or [phone]  11   If you have a prescription for pain medication follow appropriate directions on label  If no prescription was given you may take over the counter pain medication as needed  12   If you have a prescription for steroids (Prednisone, Prednisolone) you may begin taking the medication the day following the surgical procedure  13  No smoking  Avoid second hand smoke exposure

## 2022-09-07 NOTE — OP NOTE
OPERATIVE REPORT  PATIENT NAME: Zakia Apodaca    :  1960  MRN: [id number]  Pt Location: [facility] OR ROOM 06    SURGERY DATE: 2022    Surgeon(s) and Role:     * Davie Chaudhary DO - Primary    Preop Diagnosis:  Deviated nasal septum [J34 2]  Hypertrophy of both inferior nasal turbinates [J34 3]  Obstructive sleep apnea [G47 33]  Pre-operative laboratory examination [Z01 812]  Preoperative testing [Z01 818]    Post-Op Diagnosis Codes:     * Deviated nasal septum [J34 2]     * Hypertrophy of both inferior nasal turbinates [J34 3]     * Obstructive sleep apnea [G47 33]     * Pre-operative laboratory examination [Z01 812]     * Preoperative testing [Z01 818]    Procedure(s) (LRB):  SEPTOPLASTY, Collumellar Strut Reconstruction (N/A)  BILATERAL SMR TURBINATE REDUCTION WITH OUTFRACTURE (N/A)    Specimen(s):  ID Type Source Tests Collected by Time Destination   1 : septal cartilage and bone Tissue Nasal Septum TISSUE EXAM Emmett Barrios DO 2022 0756        Estimated Blood Loss:   Minimal    Drains:  * No LDAs found *    Anesthesia Type:   General    Operative Indications:  Deviated nasal septum [J34 2]  Hypertrophy of both inferior nasal turbinates [J34 3]  Obstructive sleep apnea [G47 33]  Pre-operative laboratory examination [Z01 812]  Preoperative testing [Z01 818]      Operative Findings:  As above    Complications:   None    Procedure and Technique:  After patient was properly identified in the holding area is brought to the operating room placed on the operating table in the usual supine position  Informed time-out was completed by the operative surgeon with confirmation from nursing and Anesthesia there were no contraindications to proceeding with surgery  General endotracheal anesthesia was initially when deemed adequate the patient was prepped and draped in the usual sterile fashion  Cotton pledgets soaked in 4% cocaine solution were placed in the left and right nasal vault x5 minutes  The pledgets were removed the nasal septum was injected with 1% lidocaine [id number] epinephrine for a total of 6 mL  Nasal septum was significantly deviated to the left and was off the maxillary crest to the left  The hemitransfixion incision was created with a 15 blade scalpel  Mucoperichondrial flaps were raised bilaterally with [place] elevator  Cartilaginous trauma was noted approximately 1 5 cm from the caudal end of the septal cartilage with a small perforation and acute angle just anterior to this cartilage trauma vertical incision was made with the D knife  Using a swivel knife the remaining cartilage was then removed to the bony septum  The traumatized cartilage was cut away and the remaining cartilage was saved to be morselized and replaced  The columellar strut was convex and needed to be scored and trimmed with an additional piece of cartilage cut for a columellar strut  The additional cartilage was taken from the portion of quadrangular cartilage that was removed with a swivel knife  After scoring left side of the convex columellar strut and trimming the inferior edge, commonly columellar strut was connected to the scored columellar strut using 5 0 Monocryl whipstitch x2  The columellar strut was then secured to the anterior maxillary spine using 5 0 Monocryl stitches x3, the columellar strut was now midline and straight  Bony spurring along the maxillary crest into the left nasal wall was then carefully chiseled away  The deflected bony septum posteriorly was removed using Ebony Squire forceps  The mucoperichondrial flaps were then reapproximated noted to be intact in the midline  The remaining quadrangular cartilage was morselized and placed between the flaps  The hemitransfixion incision was then closed with 4-0 chromic simple interrupted sutures  The mucoperichondrial flaps were reapproximated using a 4-0 plain suture on a [place] needle in the form of a whipstitch    The inferior turbinates were then injected with an additional total of 2 mL of 1% lidocaine [id number] epinephrine split equally between them  Using 0 degree endoscopy the turbinate blade on the microdebrider debrider was used to resect 1st the left and then the right inferior turbinate under endoscopic vision  Using right ear knife handle the turbinates were then outfractured bilaterally  Using endoscopy the nasal floor nasal airway was inspected bilaterally noted to be symmetric and widely patent  His Gaming splints covered in bacitracin ointment were placed in the left and right nasal vault and secured with a 2-0 nylon with stitch through the columella  The oropharynx was suction with no bloody discharge noted  General endotracheal anesthesia was discontinued  The patient was awakened without any difficulty and returned to recovery room stable condition       I was present for the entire procedure    Patient Disposition:  PACU       SIGNATURE: Nya Martinez DO  DATE: September 7, 2022  TIME: 8:54 AM

## 2022-09-07 NOTE — ANESTHESIA POSTPROCEDURE EVALUATION
Post-Op Assessment Note    CV Status:  Stable    Pain management: adequate     Mental Status:  Alert and awake   Hydration Status:  Euvolemic   PONV Controlled:  Controlled   Airway Patency:  Patent      Post Op Vitals Reviewed: Yes      Staff: Anesthesiologist, CRNA         No complications documented      BP      Temp      Pulse     Resp      SpO2      /92   Pulse 88   Temp 97 5 °F (36 4 °C)   Resp 18   Ht 5' 11" (1 803 m)   Wt 97 4 kg (214 lb 11 7 oz)   SpO2 93%   BMI 29 95 kg/m²

## 2022-09-07 NOTE — INTERVAL H&P NOTE
H&P reviewed  After examining the patient I find no changes in the patients condition since the H&P had been written      Vitals:    09/07/22 0558   BP: 122/74   Pulse: 69   Resp: 16   Temp: 98 5 °F (36 9 °C)   SpO2: 95%

## 2022-09-13 PROCEDURE — 88311 DECALCIFY TISSUE: CPT | Performed by: PATHOLOGY

## 2022-09-13 PROCEDURE — 88304 TISSUE EXAM BY PATHOLOGIST: CPT | Performed by: PATHOLOGY

## 2023-01-14 DIAGNOSIS — I10 ESSENTIAL HYPERTENSION: ICD-10-CM

## 2023-01-14 RX ORDER — LISINOPRIL 20 MG/1
TABLET ORAL
Qty: 90 TABLET | Refills: 1 | Status: SHIPPED | OUTPATIENT
Start: 2023-01-14

## 2023-01-23 ENCOUNTER — OFFICE VISIT (OUTPATIENT)
Dept: FAMILY MEDICINE CLINIC | Facility: CLINIC | Age: 63
End: 2023-01-23

## 2023-01-23 VITALS
SYSTOLIC BLOOD PRESSURE: 130 MMHG | DIASTOLIC BLOOD PRESSURE: 78 MMHG | HEART RATE: 80 BPM | RESPIRATION RATE: 16 BRPM | OXYGEN SATURATION: 96 % | BODY MASS INDEX: 29.88 KG/M2 | HEIGHT: 71 IN | TEMPERATURE: 97.5 F | WEIGHT: 213.4 LBS

## 2023-01-23 DIAGNOSIS — K21.9 GASTROESOPHAGEAL REFLUX DISEASE WITHOUT ESOPHAGITIS: ICD-10-CM

## 2023-01-23 DIAGNOSIS — E23.0 HYPOGONADOTROPIC HYPOGONADISM (HCC): ICD-10-CM

## 2023-01-23 DIAGNOSIS — E78.2 MIXED HYPERLIPIDEMIA: ICD-10-CM

## 2023-01-23 DIAGNOSIS — M25.561 ACUTE PAIN OF RIGHT KNEE: ICD-10-CM

## 2023-01-23 DIAGNOSIS — E55.9 VITAMIN D DEFICIENCY: ICD-10-CM

## 2023-01-23 DIAGNOSIS — I10 PRIMARY HYPERTENSION: Primary | ICD-10-CM

## 2023-01-23 RX ORDER — MULTIVITAMIN
1 CAPSULE ORAL DAILY
COMMUNITY

## 2023-01-23 RX ORDER — MELOXICAM 15 MG/1
15 TABLET ORAL DAILY
Qty: 30 TABLET | Refills: 0 | Status: SHIPPED | OUTPATIENT
Start: 2023-01-23

## 2023-01-23 NOTE — PROGRESS NOTES
Name: Breana Souza      : 1960      MRN: [id number]  Encounter Provider: Sigifredo Garvin DO  Encounter Date: 2023   Encounter department: [address] [place] [address]   Patient to continue present treatment  Discussed diagnostic and treatment options for right knee pain  Given requisition for right knee x-ray  Will heed results  Patient will try meloxicam 15 mg 1 daily with food and instructed to avoid ibuprofen and naproxen  He may take Tylenol as needed  He may continue ice as needed  Discussed switching from treadmill to stationary bike or elliptical   If knee pain not resolved discussed referral to orthopedics  Instructed to follow a low-fat, low-salt and a low carbohydrate diet continue regular aerobic exercise 150 minutes/week  Recommend colon cancer screening although patient declines again  Return to the office in 6 months  1  Primary hypertension    2  Mixed hyperlipidemia    3  Gastroesophageal reflux disease without esophagitis    4  Acute pain of right knee  -     XR knee 3 vw right non injury; Future; Expected date: 2023  -     meloxicam (Mobic) 15 mg tablet; Take 1 tablet (15 mg total) by mouth daily    5  Hypogonadotropic hypogonadism ([facility] [address] )    6  Vitamin D deficiency           Subjective      Patient is here for follow-up appoint for chronic conditions and we reviewed fasting labs from 6 months ago  Patient has never had colonoscopy or Cologuard testing and again declines at this time  Discussed importance of colon cancer screening with patient  He denies family history of colon cancer  Patient has been feeling well overall except the past 3 weeks complains of right knee pain  He denies any specific injury although thinks it may be related to walking on the treadmill 3 times a week for 10 minutes  Mitts to mild knee swelling but denies locking or giving out    He has treated this with Aleve and ice with some relief  Hypertension  This is a chronic problem  The problem is controlled  Pertinent negatives include no anxiety, blurred vision, chest pain, headaches, orthopnea, palpitations, peripheral edema, PND or shortness of breath  Risk factors for coronary artery disease include dyslipidemia, male gender and family history  Past treatments include ACE inhibitors  The current treatment provides significant improvement  There are no compliance problems  There is no history of CAD/MI or CVA  Knee Pain   The incident occurred more than 1 week ago  There was no injury mechanism  The pain is present in the right knee  Quality: sharp  Pertinent negatives include no inability to bear weight, loss of motion, muscle weakness, numbness or tingling  The symptoms are aggravated by weight bearing and movement  He has tried ice and NSAIDs for the symptoms  The treatment provided moderate relief  Review of Systems   Eyes: Negative for blurred vision  Respiratory: Negative for shortness of breath  Cardiovascular: Negative for chest pain, palpitations, orthopnea and PND  Neurological: Negative for tingling, numbness and headaches         Current Outpatient Medications on File Prior to Visit   Medication Sig   • Cholecalciferol (VITAMIN D3) 5000 units CAPS Take 1 capsule by mouth daily     • lisinopril (ZESTRIL) 20 mg tablet TAKE 1 TABLET DAILY   • Multiple Vitamin (multivitamin) capsule Take 1 capsule by mouth daily   • Omega-3 Fatty Acids (FISH OIL PO) Take by mouth daily   • omeprazole (PriLOSEC) 20 mg delayed release capsule TAKE 1 CAPSULE DAILY   • Psyllium (METAMUCIL FIBER SINGLES PO) Take by mouth in the morning   • simvastatin (ZOCOR) 40 mg tablet TAKE 1 TABLET DAILY   • sildenafil (VIAGRA) 100 mg tablet TAKE 1 TABLET AS NEEDED FORERECTILE DYSFUNCTION (Patient not taking: Reported on 1/23/2023)   • tadalafil (CIALIS) 20 MG tablet Take 1 tablet (20 mg total) by mouth daily as needed for erectile dysfunction (Patient not taking: Reported on 1/23/2023)       Objective     /78 (BP Location: Left arm, Patient Position: Sitting, Cuff Size: Standard)   Pulse 80   Temp 97 5 °F (36 4 °C) (Tympanic)   Resp 16   Ht 5' 11" (1 803 m)   Wt 96 8 kg (213 lb 6 4 oz)   SpO2 96%   BMI 29 76 kg/m²     Physical Exam  Constitutional:       Appearance: Normal appearance  HENT:      Head: Normocephalic  Mouth/Throat:      Mouth: Mucous membranes are moist    Eyes:      General: No scleral icterus  Conjunctiva/sclera: Conjunctivae normal    Neck:      Vascular: No carotid bruit  Cardiovascular:      Rate and Rhythm: Normal rate and regular rhythm  Pulmonary:      Effort: Pulmonary effort is normal       Breath sounds: Normal breath sounds  Abdominal:      Palpations: Abdomen is soft  Tenderness: There is no abdominal tenderness  Musculoskeletal:         General: Tenderness present  Cervical back: Neck supple  Right lower leg: No edema  Left lower leg: No edema  Comments: Right knee range of motion intact  Negative effusion  Positive medial joint line tenderness  Ligaments appear intact  Negative popliteal tenderness  Lymphadenopathy:      Cervical: No cervical adenopathy  Skin:     General: Skin is warm and dry  Neurological:      General: No focal deficit present  Mental Status: He is alert and oriented to person, place, and time  Psychiatric:         Mood and Affect: Mood normal          Behavior: Behavior normal          Thought Content:  Thought content normal          Judgment: Judgment normal        Sunil Pulling, DO

## 2023-02-19 DIAGNOSIS — M25.561 ACUTE PAIN OF RIGHT KNEE: ICD-10-CM

## 2023-02-19 RX ORDER — MELOXICAM 15 MG/1
15 TABLET ORAL DAILY
Qty: 30 TABLET | Refills: 0 | Status: SHIPPED | OUTPATIENT
Start: 2023-02-19

## 2023-07-13 DIAGNOSIS — E78.5 HYPERLIPIDEMIA, UNSPECIFIED HYPERLIPIDEMIA TYPE: ICD-10-CM

## 2023-07-13 DIAGNOSIS — K21.9 GASTROESOPHAGEAL REFLUX DISEASE WITHOUT ESOPHAGITIS: ICD-10-CM

## 2023-07-13 DIAGNOSIS — I10 ESSENTIAL HYPERTENSION: ICD-10-CM

## 2023-07-13 RX ORDER — SIMVASTATIN 40 MG
TABLET ORAL
Qty: 90 TABLET | Refills: 3 | Status: SHIPPED | OUTPATIENT
Start: 2023-07-13

## 2023-07-13 RX ORDER — LISINOPRIL 20 MG/1
TABLET ORAL
Qty: 90 TABLET | Refills: 1 | Status: SHIPPED | OUTPATIENT
Start: 2023-07-13

## 2023-07-13 RX ORDER — OMEPRAZOLE 20 MG/1
CAPSULE, DELAYED RELEASE ORAL
Qty: 90 CAPSULE | Refills: 3 | Status: SHIPPED | OUTPATIENT
Start: 2023-07-13

## 2023-07-24 ENCOUNTER — OFFICE VISIT (OUTPATIENT)
Dept: FAMILY MEDICINE CLINIC | Facility: CLINIC | Age: 63
End: 2023-07-24
Payer: COMMERCIAL

## 2023-07-24 VITALS
RESPIRATION RATE: 16 BRPM | HEART RATE: 84 BPM | HEIGHT: 71 IN | BODY MASS INDEX: 30.74 KG/M2 | OXYGEN SATURATION: 98 % | TEMPERATURE: 97.6 F | SYSTOLIC BLOOD PRESSURE: 128 MMHG | DIASTOLIC BLOOD PRESSURE: 82 MMHG | WEIGHT: 219.6 LBS

## 2023-07-24 DIAGNOSIS — Z12.5 SCREENING PSA (PROSTATE SPECIFIC ANTIGEN): ICD-10-CM

## 2023-07-24 DIAGNOSIS — E55.9 VITAMIN D DEFICIENCY: ICD-10-CM

## 2023-07-24 DIAGNOSIS — I10 ESSENTIAL HYPERTENSION: ICD-10-CM

## 2023-07-24 DIAGNOSIS — E78.5 HYPERLIPIDEMIA, UNSPECIFIED HYPERLIPIDEMIA TYPE: ICD-10-CM

## 2023-07-24 DIAGNOSIS — Z00.00 ANNUAL PHYSICAL EXAM: Primary | ICD-10-CM

## 2023-07-24 DIAGNOSIS — E78.2 MIXED HYPERLIPIDEMIA: ICD-10-CM

## 2023-07-24 DIAGNOSIS — Z12.11 SCREEN FOR COLON CANCER: ICD-10-CM

## 2023-07-24 DIAGNOSIS — K21.9 GASTROESOPHAGEAL REFLUX DISEASE WITHOUT ESOPHAGITIS: ICD-10-CM

## 2023-07-24 PROCEDURE — 99396 PREV VISIT EST AGE 40-64: CPT | Performed by: FAMILY MEDICINE

## 2023-07-24 RX ORDER — SIMVASTATIN 40 MG
40 TABLET ORAL DAILY
Qty: 90 TABLET | Refills: 3 | Status: SHIPPED | OUTPATIENT
Start: 2023-07-24

## 2023-07-24 RX ORDER — LISINOPRIL 20 MG/1
20 TABLET ORAL DAILY
Qty: 90 TABLET | Refills: 3 | Status: SHIPPED | OUTPATIENT
Start: 2023-07-24

## 2023-07-24 RX ORDER — OMEPRAZOLE 20 MG/1
20 CAPSULE, DELAYED RELEASE ORAL DAILY
Qty: 90 CAPSULE | Refills: 3 | Status: SHIPPED | OUTPATIENT
Start: 2023-07-24

## 2023-07-24 NOTE — PROGRESS NOTES
Name: Jessica Alarcon      : 1960      MRN: [id number]  Encounter Provider: Anna Krishna DO  Encounter Date: 2023   Encounter department: [address] [place] [address]   Patient given lab requisition for fasting labs as below. Patient to schedule follow-up colon[facility]. Patient to continue present treatment. Instructed to follow a low-fat, low-salt and a low carbohydrate diet and continue regular aerobic exercise 150 minutes/week and weight training. Return to the office in 6 months. 1. Annual physical exam    2. Essential hypertension  -     lisinopril (ZESTRIL) 20 mg tablet; Take 1 tablet (20 mg total) by mouth daily  -     CBC and Platelet; Future  -     Comprehensive metabolic panel; Future  -     TSH, 3rd generation with Free T4 reflex; Future  -     UA w Reflex to Microscopic w Reflex to Culture -Lab Collect; Future; Expected date: 2023    3. Hyperlipidemia, unspecified hyperlipidemia type  -     simvastatin (ZOCOR) 40 mg tablet; Take 1 tablet (40 mg total) by mouth daily    4. Gastroesophageal reflux disease without esophagitis  -     omeprazole (PriLOSEC) 20 mg delayed release capsule; Take 1 capsule (20 mg total) by mouth daily    5. Vitamin D deficiency  -     Vitamin D 25 hydroxy; Future    6. Screen for colon cancer  -     Ambulatory Referral to Gastroenterology; Future    7. Screening PSA (prostate specific antigen)  -     PSA, Total Screen; Future    8. Mixed hyperlipidemia  -     Comprehensive metabolic panel; Future  -     Lipid panel; Future           Subjective      Patient is here for annual physical exam and follow-up of chronic conditions. Patient has been feeling well overall and continues to exercise regularly doing weight training and walking 5 days a week for 25 minutes. Patient is overdue for colonoscopy with [facility] at  [facility] and plans to schedule. Hypertension  This is a chronic problem.  The problem is controlled. Pertinent negatives include no anxiety, blurred vision, chest pain, headaches, orthopnea, palpitations, peripheral edema, PND or shortness of breath. Risk factors for coronary artery disease include dyslipidemia, male gender and family history. Past treatments include ACE inhibitors. The current treatment provides significant improvement. There are no compliance problems. There is no history of CAD/MI or CVA. Review of Systems   Eyes: Negative for blurred vision. Respiratory: Negative for shortness of breath. Cardiovascular: Negative for chest pain, palpitations, orthopnea and PND. Neurological: Negative for headaches. Current Outpatient Medications on File Prior to Visit   Medication Sig   • Cholecalciferol (VITAMIN D3) 5000 units CAPS Take 1 capsule by mouth daily     • Multiple Vitamin (multivitamin) capsule Take 1 capsule by mouth daily   • Omega-3 Fatty Acids (FISH OIL PO) Take by mouth daily   • Psyllium (METAMUCIL FIBER SINGLES PO) Take by mouth in the morning   • [DISCONTINUED] lisinopril (ZESTRIL) 20 mg tablet TAKE 1 TABLET DAILY   • [DISCONTINUED] omeprazole (PriLOSEC) 20 mg delayed release capsule TAKE 1 CAPSULE DAILY   • [DISCONTINUED] simvastatin (ZOCOR) 40 mg tablet TAKE 1 TABLET DAILY   • sildenafil (VIAGRA) 100 mg tablet TAKE 1 TABLET AS NEEDED FORERECTILE DYSFUNCTION (Patient not taking: Reported on 1/23/2023)   • tadalafil (CIALIS) 20 MG tablet Take 1 tablet (20 mg total) by mouth daily as needed for erectile dysfunction (Patient not taking: Reported on 1/23/2023)   • [DISCONTINUED] meloxicam (MOBIC) 15 mg tablet TAKE 1 TABLET (15 MG TOTAL) BY MOUTH DAILY.        Objective     /82 (BP Location: Left arm, Patient Position: Sitting, Cuff Size: Large)   Pulse 84   Temp 97.6 °F (36.4 °C) (Tympanic)   Resp 16   Ht 5' 11" (1.803 m)   Wt 99.6 kg (219 lb 9.6 oz)   SpO2 98%   BMI 30.63 kg/m²     Physical Exam  Constitutional:       General: He is not in acute distress. Appearance: Normal appearance. HENT:      Head: Normocephalic. Mouth/Throat:      Mouth: Mucous membranes are moist.   Eyes:      General: No scleral icterus. Conjunctiva/sclera: Conjunctivae normal.   Neck:      Vascular: No carotid bruit. Cardiovascular:      Rate and Rhythm: Normal rate and regular rhythm. Pulmonary:      Effort: Pulmonary effort is normal.      Breath sounds: Normal breath sounds. Abdominal:      Palpations: Abdomen is soft. Tenderness: There is no abdominal tenderness. Musculoskeletal:      Cervical back: Neck supple. Right lower leg: No edema. Left lower leg: No edema. Lymphadenopathy:      Cervical: No cervical adenopathy. Skin:     General: Skin is warm and dry. Neurological:      General: No focal deficit present. Mental Status: He is alert and oriented to person, place, and time. Psychiatric:         Mood and Affect: Mood normal.         Behavior: Behavior normal.         Thought Content:  Thought content normal.         Judgment: Judgment normal.       Nayeli Olivo, DO

## 2023-07-26 ENCOUNTER — APPOINTMENT (OUTPATIENT)
Dept: LAB | Facility: MEDICAL CENTER | Age: 63
End: 2023-07-26
Payer: COMMERCIAL

## 2023-07-26 DIAGNOSIS — E55.9 VITAMIN D DEFICIENCY: ICD-10-CM

## 2023-07-26 DIAGNOSIS — Z12.5 SCREENING PSA (PROSTATE SPECIFIC ANTIGEN): ICD-10-CM

## 2023-07-26 DIAGNOSIS — E78.2 MIXED HYPERLIPIDEMIA: ICD-10-CM

## 2023-07-26 DIAGNOSIS — I10 ESSENTIAL HYPERTENSION: ICD-10-CM

## 2023-07-26 LAB
25(OH)D3 SERPL-MCNC: 83.4 NG/ML (ref 30–100)
ALBUMIN SERPL BCP-MCNC: 3.8 G/DL (ref 3.5–5)
ALP SERPL-CCNC: 78 U/L (ref 46–116)
ALT SERPL W P-5'-P-CCNC: 42 U/L (ref 12–78)
ANION GAP SERPL CALCULATED.3IONS-SCNC: 4 MMOL/L
AST SERPL W P-5'-P-CCNC: 26 U/L (ref 5–45)
BILIRUB SERPL-MCNC: 0.66 MG/DL (ref 0.2–1)
BILIRUB UR QL STRIP: NEGATIVE
BUN SERPL-MCNC: 15 MG/DL (ref 5–25)
CALCIUM SERPL-MCNC: 9.5 MG/DL (ref 8.3–10.1)
CHLORIDE SERPL-SCNC: 107 MMOL/L (ref 96–108)
CHOLEST SERPL-MCNC: 138 MG/DL
CLARITY UR: CLEAR
CO2 SERPL-SCNC: 29 MMOL/L (ref 21–32)
COLOR UR: NORMAL
CREAT SERPL-MCNC: 0.96 MG/DL (ref 0.6–1.3)
ERYTHROCYTE [DISTWIDTH] IN BLOOD BY AUTOMATED COUNT: 13.3 % (ref 11.6–15.1)
GFR SERPL CREATININE-BSD FRML MDRD: 83 ML/MIN/1.73SQ M
GLUCOSE P FAST SERPL-MCNC: 105 MG/DL (ref 65–99)
GLUCOSE UR STRIP-MCNC: NEGATIVE MG/DL
HCT VFR BLD AUTO: 40.8 % (ref 36.5–49.3)
HDLC SERPL-MCNC: 50 MG/DL
HGB BLD-MCNC: 13.6 G/DL (ref 12–17)
HGB UR QL STRIP.AUTO: NEGATIVE
KETONES UR STRIP-MCNC: NEGATIVE MG/DL
LDLC SERPL CALC-MCNC: 59 MG/DL (ref 0–100)
LEUKOCYTE ESTERASE UR QL STRIP: NEGATIVE
MCH RBC QN AUTO: 29.8 PG (ref 26.8–34.3)
MCHC RBC AUTO-ENTMCNC: 33.3 G/DL (ref 31.4–37.4)
MCV RBC AUTO: 90 FL (ref 82–98)
NITRITE UR QL STRIP: NEGATIVE
NONHDLC SERPL-MCNC: 88 MG/DL
PH UR STRIP.AUTO: 6 [PH]
PLATELET # BLD AUTO: 391 THOUSANDS/UL (ref 149–390)
PMV BLD AUTO: 9.7 FL (ref 8.9–12.7)
POTASSIUM SERPL-SCNC: 4.2 MMOL/L (ref 3.5–5.3)
PROT SERPL-MCNC: 8 G/DL (ref 6.4–8.4)
PROT UR STRIP-MCNC: NEGATIVE MG/DL
PSA SERPL-MCNC: 0.55 NG/ML (ref 0–4)
RBC # BLD AUTO: 4.56 MILLION/UL (ref 3.88–5.62)
SODIUM SERPL-SCNC: 140 MMOL/L (ref 135–147)
SP GR UR STRIP.AUTO: 1.01 (ref 1–1.03)
TRIGL SERPL-MCNC: 147 MG/DL
TSH SERPL DL<=0.05 MIU/L-ACNC: 3.52 UIU/ML (ref 0.45–4.5)
UROBILINOGEN UR STRIP-ACNC: <2 MG/DL
WBC # BLD AUTO: 6.26 THOUSAND/UL (ref 4.31–10.16)

## 2023-07-26 PROCEDURE — 81003 URINALYSIS AUTO W/O SCOPE: CPT

## 2023-07-26 PROCEDURE — 82306 VITAMIN D 25 HYDROXY: CPT

## 2023-07-26 PROCEDURE — 84443 ASSAY THYROID STIM HORMONE: CPT

## 2023-07-26 PROCEDURE — G0103 PSA SCREENING: HCPCS

## 2023-07-26 PROCEDURE — 85027 COMPLETE CBC AUTOMATED: CPT

## 2023-07-26 PROCEDURE — 80061 LIPID PANEL: CPT

## 2023-07-26 PROCEDURE — 36415 COLL VENOUS BLD VENIPUNCTURE: CPT

## 2023-07-26 PROCEDURE — 80053 COMPREHEN METABOLIC PANEL: CPT

## 2023-09-27 ENCOUNTER — OFFICE VISIT (OUTPATIENT)
Dept: FAMILY MEDICINE CLINIC | Facility: CLINIC | Age: 63
End: 2023-09-27
Payer: COMMERCIAL

## 2023-09-27 VITALS
RESPIRATION RATE: 16 BRPM | BODY MASS INDEX: 29.82 KG/M2 | TEMPERATURE: 97.4 F | HEART RATE: 84 BPM | SYSTOLIC BLOOD PRESSURE: 118 MMHG | DIASTOLIC BLOOD PRESSURE: 84 MMHG | OXYGEN SATURATION: 98 % | WEIGHT: 220.2 LBS | HEIGHT: 72 IN

## 2023-09-27 DIAGNOSIS — R05.1 ACUTE COUGH: ICD-10-CM

## 2023-09-27 DIAGNOSIS — J06.9 UPPER RESPIRATORY TRACT INFECTION, UNSPECIFIED TYPE: Primary | ICD-10-CM

## 2023-09-27 PROCEDURE — 99213 OFFICE O/P EST LOW 20 MIN: CPT | Performed by: FAMILY MEDICINE

## 2023-09-27 PROCEDURE — 87635 SARS-COV-2 COVID-19 AMP PRB: CPT | Performed by: FAMILY MEDICINE

## 2023-09-27 RX ORDER — AZITHROMYCIN 250 MG/1
TABLET, FILM COATED ORAL
Qty: 6 TABLET | Refills: 0 | Status: SHIPPED | OUTPATIENT
Start: 2023-09-27 | End: 2023-10-01

## 2023-09-27 RX ORDER — BENZONATATE 200 MG/1
200 CAPSULE ORAL 3 TIMES DAILY PRN
Qty: 20 CAPSULE | Refills: 0 | Status: SHIPPED | OUTPATIENT
Start: 2023-09-27

## 2023-09-27 NOTE — PROGRESS NOTES
Name: Alesha Key      : 1960      MRN: [id number]  Encounter Provider: Elliot Alexandra DO  Encounter Date: 2023   Encounter department: [address] [place] [address] [place]   Discussed diagnostic and treatment options with patient. Nasal swab obtained for COVID-19 PCR testing and sent to the lab. We will heed results. Patient restarted on a Z-Bill and Tessalon Perles as needed. Recommend increased fluids and rest.  Return to the office in 1 week or call sooner as needed. 1. Upper respiratory tract infection, unspecified type  -     azithromycin (ZITHROMAX) 250 mg tablet; Take 2 tablets today then 1 tablet daily x 4 days  -     benzonatate (TESSALON) 200 MG capsule; Take 1 capsule (200 mg total) by mouth 3 (three) times a day as needed for cough  -     COVID Only - Office Collect    2. Acute cough  -     benzonatate (TESSALON) 200 MG capsule; Take 1 capsule (200 mg total) by mouth 3 (three) times a day as needed for cough  -     COVID Only - Office Collect           Subjective      Patient started 2 days ago with sore throat and nasal congestion and now complains of nonproductive cough. He denies fever, chills, sweats, headache or body aches. He has treated this with Mucinex and NyQuil without significant relief. Patient took home COVID test yesterday which was negative. Cough  This is a new problem. The current episode started yesterday. The problem has been gradually improving. The problem occurs hourly. The cough is non-productive. Associated symptoms include nasal congestion and a sore throat. Pertinent negatives include no chest pain, chills, ear congestion, ear pain, fever, headaches, heartburn, hemoptysis, myalgias, postnasal drip, rash, rhinorrhea, shortness of breath, sweats, weight loss or wheezing. The symptoms are aggravated by lying down. He has tried OTC cough suppressant for the symptoms. The treatment provided mild relief.  There is no history of asthma or pneumonia. Sore Throat   Associated symptoms include coughing. Pertinent negatives include no ear pain, headaches or shortness of breath. Review of Systems   Constitutional: Negative for chills, fever and weight loss. HENT: Positive for sore throat. Negative for ear pain, postnasal drip and rhinorrhea. Respiratory: Positive for cough. Negative for hemoptysis, shortness of breath and wheezing. Cardiovascular: Negative for chest pain. Gastrointestinal: Negative for heartburn. Musculoskeletal: Negative for myalgias. Skin: Negative for rash. Neurological: Negative for headaches. Current Outpatient Medications on File Prior to Visit   Medication Sig   • Cholecalciferol (VITAMIN D3) 5000 units CAPS Take 1 capsule by mouth daily     • lisinopril (ZESTRIL) 20 mg tablet Take 1 tablet (20 mg total) by mouth daily   • Multiple Vitamin (multivitamin) capsule Take 1 capsule by mouth daily   • Omega-3 Fatty Acids (FISH OIL PO) Take by mouth daily   • omeprazole (PriLOSEC) 20 mg delayed release capsule Take 1 capsule (20 mg total) by mouth daily   • Psyllium (METAMUCIL FIBER SINGLES PO) Take by mouth in the morning   • simvastatin (ZOCOR) 40 mg tablet Take 1 tablet (40 mg total) by mouth daily   • sildenafil (VIAGRA) 100 mg tablet TAKE 1 TABLET AS NEEDED FORERECTILE DYSFUNCTION (Patient not taking: Reported on 1/23/2023)   • tadalafil (CIALIS) 20 MG tablet Take 1 tablet (20 mg total) by mouth daily as needed for erectile dysfunction (Patient not taking: Reported on 1/23/2023)       Objective     /84 (BP Location: Left arm, Patient Position: Sitting, Cuff Size: Large)   Pulse 84   Temp (!) 97.4 °F (36.3 °C) (Tympanic)   Resp 16   Ht 5' 11.5" (1.816 m)   Wt 99.9 kg (220 lb 3.2 oz)   SpO2 98%   BMI 30.28 kg/m²     Physical Exam  Constitutional:       General: He is not in acute distress. Appearance: Normal appearance. He is not ill-appearing, toxic-appearing or diaphoretic.    HENT: Head: Normocephalic. Right Ear: Tympanic membrane normal.      Left Ear: Tympanic membrane normal.      Nose: Congestion present. Mouth/Throat:      Mouth: Mucous membranes are moist.      Pharynx: Oropharynx is clear. Eyes:      General: No scleral icterus. Conjunctiva/sclera: Conjunctivae normal.   Cardiovascular:      Rate and Rhythm: Normal rate and regular rhythm. Pulmonary:      Effort: Pulmonary effort is normal. No respiratory distress. Breath sounds: Normal breath sounds. No wheezing. Abdominal:      Palpations: Abdomen is soft. Tenderness: There is no abdominal tenderness. Musculoskeletal:      Cervical back: Neck supple. Right lower leg: No edema. Left lower leg: No edema. Lymphadenopathy:      Cervical: No cervical adenopathy. Skin:     General: Skin is warm and dry. Neurological:      General: No focal deficit present. Mental Status: He is alert and oriented to person, place, and time. Psychiatric:         Mood and Affect: Mood normal.         Behavior: Behavior normal.         Thought Content:  Thought content normal.         Judgment: Judgment normal.       Daniel Cervantes,

## 2023-09-28 LAB — SARS-COV-2 RNA RESP QL NAA+PROBE: NEGATIVE

## 2023-11-02 ENCOUNTER — TELEPHONE (OUTPATIENT)
Dept: GASTROENTEROLOGY | Facility: CLINIC | Age: 63
End: 2023-11-02

## 2023-11-02 NOTE — TELEPHONE ENCOUNTER
Scheduled date of colonoscopy (as of today):12.07.23  Physician performing colonoscopy:DR JARAMILLO  Location of colonoscopy:[place]  Bowel prep reviewed with patient:D/M  Instructions reviewed with patient by:MAILED  Clearances: N/A    11/02/23  Screened by: Wilman Key    Referring Provider Nino Hoang    Pre- Screening: Body mass index is 30.28 kg/m². Has patient been referred for a routine screening Colonoscopy? yes  Is the patient between 43-73 years old? yes      Previous Colonoscopy yes   If yes:    Date: 12YRS AGO    Facility:     Reason:       SCHEDULING STAFF: If the patient is between 45yrs-49yrs, please advise patient to confirm benefits/coverage with their insurance company for a routine screening colonoscopy, some insurance carriers will only cover at [facility] or [facility]. If the patient is over 66years old, please schedule an office visit. Does the patient want to see a Gastroenterologist prior to their procedure OR are they having any GI symptoms? no    Has the patient been hospitalized or had abdominal surgery in the past 6 months? no    Does the patient use supplemental oxygen? no    Does the patient take Coumadin, Lovenox, Plavix, Elliquis, Xarelto, or other blood thinning medication? no    Has the patient had a stroke, cardiac event, or stent placed in the past year? no    SCHEDULING STAFF: If patient answers NO to above questions, then schedule procedure. If patient answers YES to above questions, then schedule office appointment. If patient is between 45yrs - 49yrs, please advise patient that we will have to confirm benefits & coverage with their insurance company for a routine screening colonoscopy.

## 2023-11-13 DIAGNOSIS — K21.9 GASTROESOPHAGEAL REFLUX DISEASE WITHOUT ESOPHAGITIS: ICD-10-CM

## 2023-11-13 DIAGNOSIS — E78.5 HYPERLIPIDEMIA, UNSPECIFIED HYPERLIPIDEMIA TYPE: ICD-10-CM

## 2023-11-13 DIAGNOSIS — I10 ESSENTIAL HYPERTENSION: ICD-10-CM

## 2023-11-13 RX ORDER — LISINOPRIL 20 MG/1
20 TABLET ORAL DAILY
Qty: 90 TABLET | Refills: 0 | Status: SHIPPED | OUTPATIENT
Start: 2023-11-13

## 2023-11-13 RX ORDER — SIMVASTATIN 40 MG
40 TABLET ORAL DAILY
Qty: 90 TABLET | Refills: 0 | Status: SHIPPED | OUTPATIENT
Start: 2023-11-13

## 2023-11-13 RX ORDER — OMEPRAZOLE 20 MG/1
20 CAPSULE, DELAYED RELEASE ORAL DAILY
Qty: 90 CAPSULE | Refills: 0 | Status: SHIPPED | OUTPATIENT
Start: 2023-11-13

## 2023-12-12 ENCOUNTER — TELEPHONE (OUTPATIENT)
Dept: GASTROENTEROLOGY | Facility: CLINIC | Age: 63
End: 2023-12-12

## 2024-01-29 DIAGNOSIS — K21.9 GASTROESOPHAGEAL REFLUX DISEASE WITHOUT ESOPHAGITIS: ICD-10-CM

## 2024-01-29 DIAGNOSIS — I10 ESSENTIAL HYPERTENSION: ICD-10-CM

## 2024-01-29 DIAGNOSIS — E78.5 HYPERLIPIDEMIA, UNSPECIFIED HYPERLIPIDEMIA TYPE: ICD-10-CM

## 2024-01-29 RX ORDER — OMEPRAZOLE 20 MG/1
20 CAPSULE, DELAYED RELEASE ORAL DAILY
Qty: 90 CAPSULE | Refills: 1 | Status: SHIPPED | OUTPATIENT
Start: 2024-01-29

## 2024-01-29 RX ORDER — SIMVASTATIN 40 MG
40 TABLET ORAL DAILY
Qty: 90 TABLET | Refills: 1 | Status: SHIPPED | OUTPATIENT
Start: 2024-01-29

## 2024-01-29 RX ORDER — LISINOPRIL 20 MG/1
20 TABLET ORAL DAILY
Qty: 90 TABLET | Refills: 1 | Status: SHIPPED | OUTPATIENT
Start: 2024-01-29

## 2024-01-29 NOTE — TELEPHONE ENCOUNTER
Reason for call:   [x] Refill   [] Prior Auth  [] Other:     Office:   [x] PCP/Provider - Wenceslao Sanderson,    [] Specialty/Provider -     Medication: lisinopril 20 mg / 1 tablet daily (90 tabs)                     omeprazole 20 mg / 1 tablet daily (90 tabs)                     simvastatin 40 mg / 1 tablet daily (90 tabs)      Pharmacy: [facility] pharmacy mail order    Does the patient have enough for 3 days?   [x] Yes   [] No - Send as HP to POD

## 2024-02-21 ENCOUNTER — OFFICE VISIT (OUTPATIENT)
Dept: FAMILY MEDICINE CLINIC | Facility: CLINIC | Age: 64
End: 2024-02-21
Payer: COMMERCIAL

## 2024-02-21 VITALS
HEIGHT: 72 IN | SYSTOLIC BLOOD PRESSURE: 132 MMHG | OXYGEN SATURATION: 97 % | DIASTOLIC BLOOD PRESSURE: 84 MMHG | TEMPERATURE: 97.7 F | HEART RATE: 84 BPM | WEIGHT: 221.8 LBS | BODY MASS INDEX: 30.04 KG/M2

## 2024-02-21 DIAGNOSIS — M47.812 SPONDYLOSIS OF CERVICAL REGION WITHOUT MYELOPATHY OR RADICULOPATHY: ICD-10-CM

## 2024-02-21 DIAGNOSIS — M54.2 NECK PAIN ON LEFT SIDE: Primary | ICD-10-CM

## 2024-02-21 PROCEDURE — 99213 OFFICE O/P EST LOW 20 MIN: CPT | Performed by: FAMILY MEDICINE

## 2024-02-21 RX ORDER — METHOCARBAMOL 500 MG/1
500 TABLET, FILM COATED ORAL
Qty: 30 TABLET | Refills: 0 | Status: SHIPPED | OUTPATIENT
Start: 2024-02-21

## 2024-02-21 RX ORDER — MELOXICAM 15 MG/1
15 TABLET ORAL DAILY PRN
Qty: 30 TABLET | Refills: 0 | Status: SHIPPED | OUTPATIENT
Start: 2024-02-21

## 2024-02-21 NOTE — PROGRESS NOTES
Assessment/Plan:  Discussed diagnostic and treatment options with patient.  Patient was started on meloxicam 15 mg 1 daily with food and instructed to discontinue ibuprofen and Aleve.  Patient was started on methocarbamol 500 mg 1 nightly as needed, caution regarding drowsiness.  Patient given information sheet on cervical spine range of motion stretching exercises to perform daily.  Patient may apply ice alternating with heat as needed.  Return to the office in 1 to 2 weeks or call sooner as needed.                                          No problem-specific Assessment & Plan notes found for this encounter.       Diagnoses and all orders for this visit:    Neck pain on left side  -     meloxicam (MOBIC) 15 mg tablet; Take 1 tablet (15 mg total) by mouth daily as needed for moderate pain  -     methocarbamol (ROBAXIN) 500 mg tablet; Take 1 tablet (500 mg total) by mouth daily at bedtime as needed for muscle spasms    Spondylosis of cervical region without myelopathy or radiculopathy          Subjective:      Patient ID: Isaiah Zuñiga is a 64 y.o. male.    Patient complains of left-sided neck pain for the past 5 days.  Patient denies any specific injury or fall and states that he awoke with the pain.  Patient denies pain, numbness, tingling or weakness radiating down his arm.  He denies headache.  He has treated this with ice and heat and ibuprofen without significant relief.    Neck Pain   This is a new problem. The current episode started in the past 7 days. The problem has been unchanged. The pain is associated with nothing. The pain is present in the left side. The quality of the pain is described as aching (sharp). The symptoms are aggravated by position, twisting and bending. The pain is Worse during the day. Stiffness is present In the morning. Pertinent negatives include no chest pain, headaches, numbness, tingling or weakness. He has tried heat, ice and NSAIDs for the symptoms. The treatment provided mild  "relief.       The following portions of the patient's history were reviewed and updated as appropriate: allergies, current medications, past family history, past medical history, past social history, past surgical history, and problem list.    Review of Systems   Cardiovascular:  Negative for chest pain.   Musculoskeletal:  Positive for neck pain.   Neurological:  Negative for tingling, weakness, numbness and headaches.         Objective:      /84 (BP Location: Left arm, Patient Position: Sitting, Cuff Size: Large)   Pulse 84   Temp 97.7 °F (36.5 °C) (Temporal)   Ht 5' 11.5\" (1.816 m)   Wt 101 kg (221 lb 12.8 oz)   SpO2 97%   BMI 30.50 kg/m²          Physical Exam  Constitutional:       General: He is not in acute distress.     Appearance: Normal appearance.   HENT:      Head: Normocephalic.      Mouth/Throat:      Mouth: Mucous membranes are moist.   Eyes:      General: No scleral icterus.     Conjunctiva/sclera: Conjunctivae normal.   Neck:      Comments: Decreased range of motion and rotation to the left.  Left cervical paravertebral and left trapezius muscle tenderness.  Upper extremity strength and DTRs intact.  Cardiovascular:      Rate and Rhythm: Normal rate and regular rhythm.   Pulmonary:      Effort: Pulmonary effort is normal.   Abdominal:      Tenderness: There is no abdominal tenderness.   Musculoskeletal:      Cervical back: Neck supple. Tenderness present.      Right lower leg: No edema.      Left lower leg: No edema.   Skin:     General: Skin is warm and dry.   Neurological:      General: No focal deficit present.      Mental Status: He is alert and oriented to person, place, and time.   Psychiatric:         Mood and Affect: Mood normal.         Behavior: Behavior normal.         Thought Content: Thought content normal.         Judgment: Judgment normal.           "

## 2024-03-19 DIAGNOSIS — M54.2 NECK PAIN ON LEFT SIDE: ICD-10-CM

## 2024-03-19 RX ORDER — MELOXICAM 15 MG/1
15 TABLET ORAL DAILY PRN
Qty: 30 TABLET | Refills: 0 | Status: SHIPPED | OUTPATIENT
Start: 2024-03-19

## 2024-03-19 NOTE — TELEPHONE ENCOUNTER
Name from pharmacy: MELOXICAM 15 MG TABLET          Will file in chart as: meloxicam (MOBIC) 15 mg tablet    Sig: TAKE 1 TABLET BY MOUTH DAILY AS NEEDED FOR MODERATE PAIN.    Disp: 30 tablet    Refills: 0 (Pharmacy requested: Not specified)    Start: 3/19/2024    Class: Normal    Non-formulary For: Neck pain on left side    Last ordered: 3 weeks ago (2/21/2024) by Wenceslao Sanderson DO    Last refill: 2/21/2024    Rx #: [id number]    Analgesics:  COX2 Inhibitors [id number]/2024 01:24 AM   Protocol Details HGB in normal range and within 360 days    eGFR is 60 or above and within 360 days    Valid encounter within last 6 months      To be filled at: [facility]/pharmacy #[id number] - [place] - [phone]

## 2024-03-20 DIAGNOSIS — M54.2 NECK PAIN ON LEFT SIDE: ICD-10-CM

## 2024-03-20 RX ORDER — METHOCARBAMOL 500 MG/1
500 TABLET, FILM COATED ORAL
Qty: 30 TABLET | Refills: 0 | Status: SHIPPED | OUTPATIENT
Start: 2024-03-20

## 2024-03-20 NOTE — TELEPHONE ENCOUNTER
Name from pharmacy: METHOCARBAMOL 500 MG TABLET         Will file in chart as: methocarbamol (ROBAXIN) 500 mg tablet    Sig: Take 1 tablet (500 mg total) by mouth daily at bedtime as needed for muscle spasms    Disp: 30 tablet    Refills: 0 (Pharmacy requested: Not specified)    Start: 3/20/2024    Class: Normal    For: Neck pain on left side    Last ordered: 4 weeks ago (2/21/2024) by Wenceslao Sanderson DO    Last refill: 2/21/2024    Rx #: [id number]    Analgesics:  Muscle Relaxants [id number]/20/2024 08:45 AM   Protocol Details This refill cannot be delegated    Valid encounter within last 6 months      To be filled at: [facility]/pharmacy #[id number] - [place] - [phone]

## 2024-04-05 ENCOUNTER — OFFICE VISIT (OUTPATIENT)
Dept: URGENT CARE | Facility: MEDICAL CENTER | Age: 64
End: 2024-04-05
Payer: COMMERCIAL

## 2024-04-05 VITALS
DIASTOLIC BLOOD PRESSURE: 86 MMHG | HEIGHT: 71 IN | HEART RATE: 97 BPM | RESPIRATION RATE: 18 BRPM | OXYGEN SATURATION: 97 % | SYSTOLIC BLOOD PRESSURE: 120 MMHG | BODY MASS INDEX: 30.55 KG/M2 | WEIGHT: 218.2 LBS | TEMPERATURE: 98.5 F

## 2024-04-05 DIAGNOSIS — J06.9 ACUTE URI: Primary | ICD-10-CM

## 2024-04-05 PROCEDURE — 99213 OFFICE O/P EST LOW 20 MIN: CPT | Performed by: ORTHOPAEDIC SURGERY

## 2024-04-05 RX ORDER — DEXTROMETHORPHAN HYDROBROMIDE AND PROMETHAZINE HYDROCHLORIDE 15; 6.25 MG/5ML; MG/5ML
5 SYRUP ORAL 4 TIMES DAILY PRN
Qty: 180 ML | Refills: 0 | Status: SHIPPED | OUTPATIENT
Start: 2024-04-05

## 2024-04-05 RX ORDER — AZITHROMYCIN 250 MG/1
TABLET, FILM COATED ORAL
Qty: 6 TABLET | Refills: 0 | Status: SHIPPED | OUTPATIENT
Start: 2024-04-05 | End: 2024-04-09

## 2024-04-05 RX ORDER — BENZONATATE 200 MG/1
200 CAPSULE ORAL 3 TIMES DAILY PRN
Qty: 20 CAPSULE | Refills: 0 | Status: SHIPPED | OUTPATIENT
Start: 2024-04-05

## 2024-04-05 NOTE — PROGRESS NOTES
[facility]        NAME: Isaiah Zuñiga is a 64 y.o. male  : 1960    MRN: [id number]  DATE: 2024  TIME: 2:45 PM    Assessment and Plan   Acute URI [J06.9]  1. Acute URI  benzonatate (TESSALON) 200 MG capsule    promethazine-dextromethorphan (PHENERGAN-DM) 6.25-15 mg/5 mL oral syrup    azithromycin (ZITHROMAX) 250 mg tablet        Discussed with patient the pathophysiology of viral versus bacterial upper respiratory/sinus infections and medical management options of both. Advised patient that their symptoms are most consistent with a viral illness at this time. If signs of a bacterial infection develop, however, a course of antibiotics has been sent to the pharmacy to take as directed as the patient noted his family doctor always gives him a Z-pack when he feels like this and he feels better within three days.    Patient Instructions       Most upper respiratory infections are viral and resolve on their own within 10-14 days. Antibiotics are not indicated for the viral infection, and are only prescribed if there is evidence for a bacterial infection. Sometimes an upper respiratory infection may lead to secondary bacterial infection, such as bacterial sinusitis, in which case antibiotics would be indicated at that time. If your symptoms continue beyond 10-14 days or if you experience ongoing fevers, productive cough with green, brown, bloody phlegm production, you may have developed a bacterial infection. For the uncomplicated viral upper respiratory infection conservative management includes:    Fever and pain control:  Ibuprofen (Motrin) 600mg every 6 hours for fever, headaches, body aches   Ibuprofen is an NSAID. Please stop medication if you experience stomach/abdominal pain and report to your primary care provider.   Ask your primary care provider before you take NSAIDs if you are on any blood thinners, or if you have a history of heart disease, kidney disease, gastric bypass surgery,  GI bleed, or poorly controlled high blood pressure.   May use acetaminophen (Tylenol) as directed on the bottle between doses of ibuprofen. Do not exceed 4,000mg of Tylenol a day.   Cough & Congestion:  Guaifenesin (Mucinex) as directed on the bottle for congestion and mucous-y cough.   Dextromethorphan (Delsym, Robitussin) for dry cough and cough suppression   Pseudoephedrine (Sudafed) for congestion and sinus pressure   Sudafed may cause increased heart rate, irregular heart rate, and an increase in blood pressure. Please do not take Sudafed if you have a history of heart disease or high blood pressure.   Sudafed should not be taken if you are on anti-depressants such as those belonging to the class MAOIs or tricyclics.  Coricidin HBP (chlorpheniramine maleate) can be used as a decongestant in place of other options for those unable to take Sudafed.   Combination cough and cold such as Dimetapp and Mucinex DM also available  Sudafed PE Head Congestion +Flu Severe contains a combination of Sudafed, Tylenol, Mucinex, and Delsym  If prescribed, take Tessalon Pearles or Bromfed/Phenergan DM as directed  Avoid taking prescription cough/congestion medication and OTC options at the same time  Sore Throat:  Cepacol lozenges  Chloraseptic spray  Throat Coat tea  Warm salt water gargles   Vitamin/Minerals:  Vitamin D3 2,000 IU daily  Vitamin C 1000mg twice a day  Some studies suggest that Zinc 12.5-15mg every 2 hours while awake for 5 days may shorten symptom duration by 1-2 days  Other:   Plenty of fluids and rest  Cool mist humidifiers  Nasal sinus rinses such as NettiPot, Neimed, or Navage can be used to help flush out sinuses  Please only use distilled/sterile water that can be purchased at your local pharmacy  Nasal spray options:  Nasal steroid sprays such as Flonase, Nasonex, Nasacort may help with sinus congestion, itchy/watery eyes, clogged ears  These options must be used consistently for at least 2 weeks for full  effect  Afrin nasal spray for quick acting congestion relief  Saline nasal spray for dry nose, irritation of the nasal passages  If symptoms persist or worsen within 7-10 days, please start taking the antibiotic as prescribed  Follow up with PCP in 3-5 days  Proceed to the ED if symptoms worsen     If tests are performed, our office will contact you with results only if changes need to made to the care plan discussed with you at the visit. You can review your full results on [facility].    Chief Complaint     Chief Complaint   Patient presents with    Cough     Pt states he started Wednesday with a sore throat and now developed a productive cough with clear sputum.  Sinus congestion also present.           History of Present Illness       Cough  This is a new (patient was around family for the holidays, grandchildren) problem. The current episode started in the past 7 days (Wednesday). The problem has been unchanged. The cough is Productive of sputum. Associated symptoms include nasal congestion and a sore throat. Pertinent negatives include no chest pain, chills, ear congestion, ear pain, fever, headaches, heartburn, hemoptysis, myalgias, postnasal drip, rash, rhinorrhea, shortness of breath, sweats, weight loss or wheezing. Nothing aggravates the symptoms. Treatments tried: Nyquil, throat lozenges, Mucinex. The treatment provided mild relief. There is no history of asthma, bronchitis, COPD or emphysema.       Review of Systems   Review of Systems   Constitutional:  Negative for chills, fever and weight loss.   HENT:  Positive for congestion and sore throat. Negative for ear pain, postnasal drip and rhinorrhea.    Eyes:  Negative for pain and visual disturbance.   Respiratory:  Positive for cough. Negative for hemoptysis, shortness of breath and wheezing.    Cardiovascular:  Negative for chest pain and palpitations.   Gastrointestinal:  Negative for abdominal pain, diarrhea, heartburn, nausea and vomiting.    Genitourinary:  Negative for dysuria and hematuria.   Musculoskeletal:  Negative for arthralgias, back pain and myalgias.   Skin:  Negative for color change and rash.   Neurological:  Negative for dizziness, seizures, syncope and headaches.   All other systems reviewed and are negative.        Current Medications       Current Outpatient Medications:     azithromycin (ZITHROMAX) 250 mg tablet, Take 2 tablets today then 1 tablet daily x 4 days, Disp: 6 tablet, Rfl: 0    benzonatate (TESSALON) 200 MG capsule, Take 1 capsule (200 mg total) by mouth 3 (three) times a day as needed for cough, Disp: 20 capsule, Rfl: 0    Cholecalciferol (VITAMIN D3) 5000 units CAPS, Take 1 capsule by mouth daily  , Disp: , Rfl:     lisinopril (ZESTRIL) 20 mg tablet, Take 1 tablet (20 mg total) by mouth daily, Disp: 90 tablet, Rfl: 1    Multiple Vitamin (multivitamin) capsule, Take 1 capsule by mouth daily, Disp: , Rfl:     Omega-3 Fatty Acids (FISH OIL PO), Take by mouth daily, Disp: , Rfl:     omeprazole (PriLOSEC) 20 mg delayed release capsule, Take 1 capsule (20 mg total) by mouth daily, Disp: 90 capsule, Rfl: 1    promethazine-dextromethorphan (PHENERGAN-DM) 6.25-15 mg/5 mL oral syrup, Take 5 mL by mouth 4 (four) times a day as needed for cough, Disp: 180 mL, Rfl: 0    Psyllium (METAMUCIL FIBER SINGLES PO), Take by mouth in the morning, Disp: , Rfl:     simvastatin (ZOCOR) 40 mg tablet, Take 1 tablet (40 mg total) by mouth daily, Disp: 90 tablet, Rfl: 1    meloxicam (MOBIC) 15 mg tablet, TAKE 1 TABLET BY MOUTH DAILY AS NEEDED FOR MODERATE PAIN. (Patient not taking: Reported on 4/5/2024), Disp: 30 tablet, Rfl: 0    methocarbamol (ROBAXIN) 500 mg tablet, TAKE 1 TABLET (500 MG TOTAL) BY MOUTH DAILY AT BEDTIME AS NEEDED FOR MUSCLE SPASMS (Patient not taking: Reported on 4/5/2024), Disp: 30 tablet, Rfl: 0    Current Allergies     Allergies as of 04/05/2024 - Reviewed 04/05/2024   Allergen Reaction Noted    Moxifloxacin  06/12/2012         "    The following portions of the patient's history were reviewed and updated as appropriate: allergies, current medications, past family history, past medical history, past social history, past surgical history and problem list.     Past Medical History:   Diagnosis Date    GERD (gastroesophageal reflux disease)     Hyperlipidemia     Hypertension     Hypogonadotropic hypogonadism (HCC)     Sleep apnea        Past Surgical History:   Procedure Laterality Date    COLONOSCOPY      AL SEPTOPLASTY/SUBMUCOUS RESECJ W/WO CARTILAGE GRF N/A 9/7/2022    Procedure: SEPTOPLASTY, Collumellar Strut Reconstruction;  Surgeon: Osei Salinas DO;  Location: [facility];  Service: ENT    AL SUBMUCOUS RESCJ INFERIOR TURBINATE PRTL/COMPL N/A 9/7/2022    Procedure: BILATERAL SMR TURBINATE REDUCTION WITH OUTFRACTURE;  Surgeon: Osei Salinas DO;  Location: [facility];  Service: ENT    TONSILLECTOMY      WISDOM TOOTH EXTRACTION         Family History   Problem Relation Age of Onset    Hypertension Mother     Kidney failure Mother     Hypertension Father     Hypertension Brother     Heart attack Brother     Heart attack Brother         45         Medications have been verified.        Objective   /86   Pulse 97   Temp 98.5 °F (36.9 °C)   Resp 18   Ht 5' 11\" (1.803 m)   Wt 99 kg (218 lb 3.2 oz)   SpO2 97%   BMI 30.43 kg/m²        Physical Exam     Physical Exam  Vitals and nursing note reviewed.   Constitutional:       General: He is not in acute distress.     Appearance: Normal appearance. He is not ill-appearing.   HENT:      Head: Normocephalic and atraumatic.      Right Ear: Tympanic membrane normal.      Left Ear: Tympanic membrane normal.      Nose: Nose normal.      Mouth/Throat:      Mouth: Mucous membranes are moist.      Pharynx: Oropharynx is clear. No oropharyngeal exudate or posterior oropharyngeal erythema.   Eyes:      Extraocular Movements: Extraocular movements intact.      Pupils: Pupils are " equal, round, and reactive to light.   Cardiovascular:      Rate and Rhythm: Normal rate and regular rhythm.      Pulses: Normal pulses.      Heart sounds: Normal heart sounds. No murmur heard.  Pulmonary:      Effort: Pulmonary effort is normal. No respiratory distress.      Breath sounds: Normal breath sounds. No wheezing or rhonchi.   Abdominal:      Palpations: Abdomen is soft.      Tenderness: There is no abdominal tenderness.   Musculoskeletal:         General: Normal range of motion.      Cervical back: Normal range of motion.   Lymphadenopathy:      Cervical: No cervical adenopathy.   Skin:     General: Skin is warm and dry.      Capillary Refill: Capillary refill takes less than 2 seconds.   Neurological:      General: No focal deficit present.      Mental Status: He is alert and oriented to person, place, and time.   Psychiatric:         Mood and Affect: Mood normal.         Behavior: Behavior normal.

## 2024-04-05 NOTE — PATIENT INSTRUCTIONS
Most upper respiratory infections are viral and resolve on their own within 10-14 days. Antibiotics are not indicated for the viral infection, and are only prescribed if there is evidence for a bacterial infection. Sometimes an upper respiratory infection may lead to secondary bacterial infection, such as bacterial sinusitis, in which case antibiotics would be indicated at that time. If your symptoms continue beyond 10-14 days or if you experience ongoing fevers, productive cough with green, brown, bloody phlegm production, you may have developed a bacterial infection. For the uncomplicated viral upper respiratory infection conservative management includes:    Fever and pain control:  Ibuprofen (Motrin) 600mg every 6 hours for fever, headaches, body aches   Ibuprofen is an NSAID. Please stop medication if you experience stomach/abdominal pain and report to your primary care provider.   Ask your primary care provider before you take NSAIDs if you are on any blood thinners, or if you have a history of heart disease, kidney disease, gastric bypass surgery, GI bleed, or poorly controlled high blood pressure.   May use acetaminophen (Tylenol) as directed on the bottle between doses of ibuprofen. Do not exceed 4,000mg of Tylenol a day.   Cough & Congestion:  Guaifenesin (Mucinex) as directed on the bottle for congestion and mucous-y cough.   Dextromethorphan (Delsym, Robitussin) for dry cough and cough suppression   Pseudoephedrine (Sudafed) for congestion and sinus pressure   Sudafed may cause increased heart rate, irregular heart rate, and an increase in blood pressure. Please do not take Sudafed if you have a history of heart disease or high blood pressure.   Sudafed should not be taken if you are on anti-depressants such as those belonging to the class MAOIs or tricyclics.  Coricidin HBP (chlorpheniramine maleate) can be used as a decongestant in place of other options for those unable to take Sudafed.   Combination  cough and cold such as Dimetapp and Mucinex DM also available  Sudafed PE Head Congestion +Flu Severe contains a combination of Sudafed, Tylenol, Mucinex, and Delsym  If prescribed, take Tessalon Pearles or Bromfed/Phenergan DM as directed  Avoid taking prescription cough/congestion medication and OTC options at the same time  Sore Throat:  Cepacol lozenges  Chloraseptic spray  Throat Coat tea  Warm salt water gargles   Vitamin/Minerals:  Vitamin D3 2,000 IU daily  Vitamin C 1000mg twice a day  Some studies suggest that Zinc 12.5-15mg every 2 hours while awake for 5 days may shorten symptom duration by 1-2 days  Other:   Plenty of fluids and rest  Cool mist humidifiers  Nasal sinus rinses such as NettiPot, Neimed, or Navage can be used to help flush out sinuses  Please only use distilled/sterile water that can be purchased at your local pharmacy  Nasal spray options:  Nasal steroid sprays such as Flonase, Nasonex, Nasacort may help with sinus congestion, itchy/watery eyes, clogged ears  These options must be used consistently for at least 2 weeks for full effect  Afrin nasal spray for quick acting congestion relief  Saline nasal spray for dry nose, irritation of the nasal passages  If symptoms persist or worsen within 7-10 days, please start taking the antibiotic as prescribed  Follow up with PCP in 3-5 days  Proceed to the ED if symptoms worsen

## 2024-04-15 DIAGNOSIS — K21.9 GASTROESOPHAGEAL REFLUX DISEASE WITHOUT ESOPHAGITIS: ICD-10-CM

## 2024-04-16 DIAGNOSIS — K21.9 GASTROESOPHAGEAL REFLUX DISEASE WITHOUT ESOPHAGITIS: ICD-10-CM

## 2024-04-16 RX ORDER — OMEPRAZOLE 20 MG/1
CAPSULE, DELAYED RELEASE ORAL
Qty: 90 CAPSULE | Refills: 1 | Status: SHIPPED | OUTPATIENT
Start: 2024-04-16 | End: 2024-04-17

## 2024-04-17 RX ORDER — OMEPRAZOLE 20 MG/1
20 CAPSULE, DELAYED RELEASE ORAL DAILY
Qty: 90 CAPSULE | Refills: 1 | Status: SHIPPED | OUTPATIENT
Start: 2024-04-17

## 2024-04-17 NOTE — TELEPHONE ENCOUNTER
Name from pharmacy: OMEPRAZOLE 20MG CPDR         Will file in chart as: omeprazole (PriLOSEC) 20 mg delayed release capsule     Possible duplicate: Hover to review recent actions on this medication    Sig: N/A    Disp: Not specified    Refills: Not specified    Start: 4/16/2024    Class: Normal    Non-formulary For: Gastroesophageal reflux disease without esophagitis    Last ordered: Yesterday (4/16/2024) by Wenceslao Sanderson DO    Rx #: [id number]    Pharmacy comment: RX NEEDS DIRECTIONS.    Gastroenterology: Proton Pump Inhibitors Cypcin4504/16/2024 10:12 AM   Protocol Details Valid encounter within last 12 months    Patient is over 2 years old.   [facility] Embedded [id number]/16/2024 10:12 AM   This is a duplicate request of medication ordered 2024-04-16. Check to see if the patient is requesting a refill from a new pharmacy.      This request has changes from the previous prescription.   To be filled at: [facility] [address]

## 2024-06-08 DIAGNOSIS — I10 ESSENTIAL HYPERTENSION: ICD-10-CM

## 2024-06-08 DIAGNOSIS — E78.5 HYPERLIPIDEMIA, UNSPECIFIED HYPERLIPIDEMIA TYPE: ICD-10-CM

## 2024-06-08 RX ORDER — SIMVASTATIN 40 MG
40 TABLET ORAL DAILY
Qty: 90 TABLET | Refills: 1 | Status: SHIPPED | OUTPATIENT
Start: 2024-06-08

## 2024-06-08 RX ORDER — LISINOPRIL 20 MG/1
20 TABLET ORAL DAILY
Qty: 90 TABLET | Refills: 1 | Status: SHIPPED | OUTPATIENT
Start: 2024-06-08

## 2024-08-14 DIAGNOSIS — K21.9 GASTROESOPHAGEAL REFLUX DISEASE WITHOUT ESOPHAGITIS: ICD-10-CM

## 2024-08-14 DIAGNOSIS — I10 ESSENTIAL HYPERTENSION: ICD-10-CM

## 2024-08-14 DIAGNOSIS — E78.5 HYPERLIPIDEMIA, UNSPECIFIED HYPERLIPIDEMIA TYPE: ICD-10-CM

## 2024-08-14 NOTE — TELEPHONE ENCOUNTER
Due to insurance change prescriptions now need to go to [facility]     Reason for call:   [x] Refill   [] Prior Auth  [] Other:     Office:   [x] PCP/Provider - Wenceslao Sanderson DO   [] Specialty/Provider -           Pharmacy: [facility]/pharmacy #[id number] - [place] - [phone]      Does the patient have enough for 3 days?   [x] Yes   [] No - Send as HP to POD

## 2024-08-15 RX ORDER — SIMVASTATIN 40 MG
40 TABLET ORAL DAILY
Qty: 30 TABLET | Refills: 0 | Status: SHIPPED | OUTPATIENT
Start: 2024-08-15

## 2024-08-15 RX ORDER — LISINOPRIL 20 MG/1
20 TABLET ORAL DAILY
Qty: 30 TABLET | Refills: 0 | Status: SHIPPED | OUTPATIENT
Start: 2024-08-15

## 2024-09-07 DIAGNOSIS — E78.5 HYPERLIPIDEMIA, UNSPECIFIED HYPERLIPIDEMIA TYPE: ICD-10-CM

## 2024-09-07 DIAGNOSIS — I10 ESSENTIAL HYPERTENSION: ICD-10-CM

## 2024-09-09 ENCOUNTER — TELEPHONE (OUTPATIENT)
Age: 64
End: 2024-09-09

## 2024-09-09 RX ORDER — SIMVASTATIN 40 MG
40 TABLET ORAL DAILY
Qty: 90 TABLET | Refills: 1 | Status: SHIPPED | OUTPATIENT
Start: 2024-09-09

## 2024-09-09 RX ORDER — LISINOPRIL 20 MG/1
20 TABLET ORAL DAILY
Qty: 90 TABLET | Refills: 1 | Status: SHIPPED | OUTPATIENT
Start: 2024-09-09

## 2024-09-09 NOTE — TELEPHONE ENCOUNTER
Name from pharmacy: LISINOPRIL 20 MG TABLET         Will file in chart as: lisinopril (ZESTRIL) 20 mg tablet    Sig: TAKE 1 TABLET BY MOUTH EVERY DAY    Disp: 90 tablet    Refills: 1    Start: 9/7/2024    Class: Normal    Non-formulary For: Essential hypertension    Last ordered: 3 weeks ago (8/15/2024) by Wenceslao Sanderson DO    Last refill: 8/16/2024    Rx #: [id number]    Pharmacy comment: REQUEST FOR 90 DAYS PRESCRIPTION. DX Code Needed.    Cardiovascular:  ACE Inhibitors Hebqby1309/07/2024 11:35 PM   Protocol Details K is 5.3 or below and within 360 days    eGFR is 60 or above and within 360 days    Valid encounter within last 6 months    BP completed in the last 6 months      This request has changes from the previous prescription.    Name from pharmacy: SIMVASTATIN 40 MG TABLET         Will file in chart as: simvastatin (ZOCOR) 40 mg tablet    Sig: TAKE 1 TABLET BY MOUTH EVERY DAY    Disp: 90 tablet    Refills: 1    Start: 9/7/2024    Class: Normal    For: Hyperlipidemia, unspecified hyperlipidemia type    Last ordered: 3 weeks ago (8/15/2024) by Wenceslao Sanderson DO    Last refill: 8/16/2024    Rx #: [id number]    Pharmacy comment: REQUEST FOR 90 DAYS PRESCRIPTION. DX Code Needed.    Cardiovascular:  Antilipid - Statins Tktgmf8309/07/2024 11:35 PM   Protocol Details Total Cholesterol within 360 days    LDL within 360 days    HDL within 360 days    Triglycerides within 360 days    Valid encounter within last 12 months      This request has changes from the previous prescription.   To be filled at: [facility]/pharmacy #[id number] - [place] - [phone]      Warm

## 2024-09-09 NOTE — TELEPHONE ENCOUNTER
Patient called requesting refill for omeprazole. Patient made aware medication was refilled on 08/15/24 for 90 with 1 refills to [facility] pharmacy. Patient instructed to contact the pharmacy to obtain refills of medication. Patient verbalized understanding.

## 2024-10-22 ENCOUNTER — APPOINTMENT (EMERGENCY)
Dept: RADIOLOGY | Facility: HOSPITAL | Age: 64
End: 2024-10-22
Payer: COMMERCIAL

## 2024-10-22 ENCOUNTER — HOSPITAL ENCOUNTER (EMERGENCY)
Facility: HOSPITAL | Age: 64
Discharge: HOME | End: 2024-10-22
Attending: EMERGENCY MEDICINE
Payer: COMMERCIAL

## 2024-10-22 VITALS
HEART RATE: 84 BPM | WEIGHT: 208.56 LBS | TEMPERATURE: 97.9 F | DIASTOLIC BLOOD PRESSURE: 75 MMHG | BODY MASS INDEX: 29.09 KG/M2 | SYSTOLIC BLOOD PRESSURE: 157 MMHG | RESPIRATION RATE: 20 BRPM | OXYGEN SATURATION: 98 %

## 2024-10-22 DIAGNOSIS — R07.9 CHEST PAIN, UNSPECIFIED: Primary | ICD-10-CM

## 2024-10-22 LAB
2HR DELTA HS TROPONIN: 1 NG/L
ALBUMIN SERPL BCG-MCNC: 4.2 G/DL (ref 3.5–5)
ALP SERPL-CCNC: 92 U/L (ref 34–104)
ALT SERPL W P-5'-P-CCNC: 169 U/L (ref 7–52)
ANION GAP SERPL CALCULATED.3IONS-SCNC: 7 MMOL/L (ref 4–13)
AST SERPL W P-5'-P-CCNC: 99 U/L (ref 13–39)
ATRIAL RATE: 84 BPM
ATRIAL RATE: 87 BPM
BASOPHILS # BLD AUTO: 0.05 THOUSANDS/ΜL (ref 0–0.1)
BASOPHILS NFR BLD AUTO: 1 % (ref 0–1)
BILIRUB SERPL-MCNC: 0.54 MG/DL (ref 0.2–1)
BNP SERPL-MCNC: 8 PG/ML (ref 0–100)
BUN SERPL-MCNC: 19 MG/DL (ref 5–25)
CALCIUM SERPL-MCNC: 9.6 MG/DL (ref 8.4–10.2)
CARDIAC TROPONIN I PNL SERPL HS: 6 NG/L
CARDIAC TROPONIN I PNL SERPL HS: 7 NG/L
CHLORIDE SERPL-SCNC: 102 MMOL/L (ref 96–108)
CO2 SERPL-SCNC: 28 MMOL/L (ref 21–32)
CREAT SERPL-MCNC: 0.91 MG/DL (ref 0.6–1.3)
EOSINOPHIL # BLD AUTO: 0.01 THOUSAND/ΜL (ref 0–0.61)
EOSINOPHIL NFR BLD AUTO: 0 % (ref 0–6)
ERYTHROCYTE [DISTWIDTH] IN BLOOD BY AUTOMATED COUNT: 13.1 % (ref 11.6–15.1)
GFR SERPL CREATININE-BSD FRML MDRD: 88 ML/MIN/1.73SQ M
GLUCOSE SERPL-MCNC: 110 MG/DL (ref 65–140)
HCT VFR BLD AUTO: 39.5 % (ref 36.5–49.3)
HGB BLD-MCNC: 13.5 G/DL (ref 12–17)
IMM GRANULOCYTES # BLD AUTO: 0.02 THOUSAND/UL (ref 0–0.2)
IMM GRANULOCYTES NFR BLD AUTO: 0 % (ref 0–2)
LIPASE SERPL-CCNC: 39 U/L (ref 11–82)
LYMPHOCYTES # BLD AUTO: 2.12 THOUSANDS/ΜL (ref 0.6–4.47)
LYMPHOCYTES NFR BLD AUTO: 32 % (ref 14–44)
MCH RBC QN AUTO: 30 PG (ref 26.8–34.3)
MCHC RBC AUTO-ENTMCNC: 34.2 G/DL (ref 31.4–37.4)
MCV RBC AUTO: 88 FL (ref 82–98)
MONOCYTES # BLD AUTO: 0.77 THOUSAND/ΜL (ref 0.17–1.22)
MONOCYTES NFR BLD AUTO: 12 % (ref 4–12)
NEUTROPHILS # BLD AUTO: 3.66 THOUSANDS/ΜL (ref 1.85–7.62)
NEUTS SEG NFR BLD AUTO: 55 % (ref 43–75)
NRBC BLD AUTO-RTO: 0 /100 WBCS
P AXIS: 43 DEGREES
P AXIS: 70 DEGREES
PLATELET # BLD AUTO: 368 THOUSANDS/UL (ref 149–390)
PMV BLD AUTO: 8.9 FL (ref 8.9–12.7)
POTASSIUM SERPL-SCNC: 4 MMOL/L (ref 3.5–5.3)
PR INTERVAL: 176 MS
PR INTERVAL: 180 MS
PROT SERPL-MCNC: 7.3 G/DL (ref 6.4–8.4)
QRS AXIS: 64 DEGREES
QRS AXIS: 73 DEGREES
QRSD INTERVAL: 90 MS
QRSD INTERVAL: 92 MS
QT INTERVAL: 366 MS
QT INTERVAL: 378 MS
QTC INTERVAL: 440 MS
QTC INTERVAL: 446 MS
RBC # BLD AUTO: 4.5 MILLION/UL (ref 3.88–5.62)
SODIUM SERPL-SCNC: 137 MMOL/L (ref 135–147)
T WAVE AXIS: 67 DEGREES
T WAVE AXIS: 72 DEGREES
VENTRICULAR RATE: 84 BPM
VENTRICULAR RATE: 87 BPM
WBC # BLD AUTO: 6.63 THOUSAND/UL (ref 4.31–10.16)

## 2024-10-22 PROCEDURE — 83690 ASSAY OF LIPASE: CPT | Performed by: EMERGENCY MEDICINE

## 2024-10-22 PROCEDURE — 83880 ASSAY OF NATRIURETIC PEPTIDE: CPT | Performed by: EMERGENCY MEDICINE

## 2024-10-22 PROCEDURE — 93010 ELECTROCARDIOGRAM REPORT: CPT | Performed by: INTERNAL MEDICINE

## 2024-10-22 PROCEDURE — 99285 EMERGENCY DEPT VISIT HI MDM: CPT

## 2024-10-22 PROCEDURE — 80053 COMPREHEN METABOLIC PANEL: CPT | Performed by: EMERGENCY MEDICINE

## 2024-10-22 PROCEDURE — 84484 ASSAY OF TROPONIN QUANT: CPT | Performed by: EMERGENCY MEDICINE

## 2024-10-22 PROCEDURE — 99285 EMERGENCY DEPT VISIT HI MDM: CPT | Performed by: EMERGENCY MEDICINE

## 2024-10-22 PROCEDURE — 71045 X-RAY EXAM CHEST 1 VIEW: CPT

## 2024-10-22 PROCEDURE — 36415 COLL VENOUS BLD VENIPUNCTURE: CPT

## 2024-10-22 PROCEDURE — 85025 COMPLETE CBC W/AUTO DIFF WBC: CPT | Performed by: EMERGENCY MEDICINE

## 2024-10-22 PROCEDURE — 93005 ELECTROCARDIOGRAM TRACING: CPT

## 2024-10-22 NOTE — ED PROVIDER NOTES
Time reflects when diagnosis was documented in both MDM as applicable and the Disposition within this note       Time User Action Codes Description Comment    10/22/2024  3:43 AM Charlotte Jacinto JANY Add [R07.9] Chest pain, unspecified           ED Disposition       ED Disposition   Discharge    Condition   Stable    Date/Time   Tue Oct 22, 2024  3:43 AM    Comment   Isaiah Zuñiga discharge to home/self care.                   Assessment & Plan   {Hyperlinks  Risk Stratification - NIHSS - HEART SCORE - Fill out sepsis note and make sure you call [id number] if severe or septic shock:[id number]}    Medical Decision Making  Amount and/or Complexity of Data Reviewed  Labs: ordered.  Radiology: ordered.             Medications - No data to display    ED Risk Strat Scores   HEART Risk Score      Flowsheet Row Most Recent Value   Heart Score Risk Calculator    History 1 Filed at: 10/22/2024 0343   ECG 0 Filed at: 10/22/2024 0343   Age 2 Filed at: 10/22/2024 0343   Risk Factors 1 Filed at: 10/22/2024 0343   Troponin 0 Filed at: 10/22/2024 0343   HEART Score 4 Filed at: 10/22/2024 0343                               SBIRT 22yo+      Flowsheet Row Most Recent Value   Initial Alcohol Screen: US AUDIT-C     1. How often do you have a drink containing alcohol? 3 Filed at: 10/22/2024 0042   2. How many drinks containing alcohol do you have on a typical day you are drinking?  2 Filed at: 10/22/2024 0042   3a. Male UNDER 65: How often do you have five or more drinks on one occasion? 0 Filed at: 10/22/2024 0042   3b. FEMALE Any Age, or MALE 65+: How often do you have 4 or more drinks on one occassion? 0 Filed at: 10/22/2024 0042   Audit-C Score 5 Filed at: 10/22/2024 0042   KIARA: How many times in the past year have you...    Used an illegal drug or used a prescription medication for non-medical reasons? Never Filed at: 10/22/2024 0042                            History of Present Illness   {Hyperlinks  History (Med, Surg, Fam, Social) -  Current Medications - Allergies  :[id number]}    Chief Complaint   Patient presents with    Chest Pain     Pt states he started with chest pain at 2300 hours. Pt describes the pain as tightness, states he initially thought it was reflux, but is not getting better. - cardiac hx.       Past Medical History:   Diagnosis Date    GERD (gastroesophageal reflux disease)     Hyperlipidemia     Hypertension     Hypogonadotropic hypogonadism (HCC)     Sleep apnea       Past Surgical History:   Procedure Laterality Date    COLONOSCOPY      [place] SEPTOPLASTY/SUBMUCOUS RESECJ W/WO CARTILAGE GRF N/A 9/7/2022    Procedure: SEPTOPLASTY, Collumellar Strut Reconstruction;  Surgeon: Osei Salinas DO;  Location: [facility];  Service: ENT    W[place] SUBMUCOUS RESCJ INFERIOR TURBINATE PRTL/COMPL N/A 9/7/2022    Procedure: BILATERAL SMR TURBINATE REDUCTION WITH OUTFRACTURE;  Surgeon: Osei Salinas DO;  Location: [facility];  Service: ENT    TONSILLECTOMY      WISDOM TOOTH EXTRACTION        Family History   Problem Relation Age of Onset    Hypertension Mother     Kidney failure Mother     Hypertension Father     Hypertension Brother     Heart attack Brother     Heart attack Brother         45      Social History     Tobacco Use    Smoking status: Never    Smokeless tobacco: Former     Types: Snuff     Quit date: 10/1/2018    Tobacco comments:     off and on not every day   Vaping Use    Vaping status: Never Used   Substance Use Topics    Alcohol use: Yes     Alcohol/week: 4.0 standard drinks of alcohol     Types: 4 Standard drinks or equivalent per week    Drug use: No      E-Cigarette/Vaping    E-Cigarette Use Never User       E-Cigarette/Vaping Substances    Nicotine No     THC No     CBD No     Flavoring No     Other No     Unknown No       I have reviewed and agree with the history as documented.     HPI    Review of Systems        Objective   {Hyperlinks  Historical Vitals - Historical Labs - Chart Review/Microbiology - Last  Echo - Code Status  :[id number]}    ED Triage Vitals [10/22/24 0043]   Temperature Pulse Blood Pressure Respirations SpO2 Patient Position - Orthostatic VS   97.9 °F (36.6 °C) 87 (!) 207/103 20 98 % Lying      Temp Source Heart Rate Source BP Location FiO2 (%) Pain Score    Oral Monitor Right arm -- 8      Vitals      Date and Time Temp Pulse SpO2 Resp BP Pain Score FACES Pain Rating User   10/22/24 0300 -- 84 98 % 20 157/75 -- -- TP   10/22/24 0059 -- -- -- -- -- 6 -- TP   10/22/24 0043 97.9 °F (36.6 °C) 87 98 % 20 207/103 8 --             Physical Exam    Results Reviewed       Procedure Component Value Units Date/Time    HS Troponin I 2hr [[id number]]  (Normal) Collected: 10/22/24 0301    Lab Status: Final result Specimen: Blood from Arm, Left Updated: 10/22/24 0329     hs TnI 2hr 7 ng/L      Delta 2hr hsTnI 1 ng/L     HS Troponin I 4hr [[id number]]     Lab Status: No result Specimen: Blood     Lipase [[id number]]  (Normal) Collected: 10/22/24 0206    Lab Status: Final result Specimen: Blood from Arm, Right Updated: 10/22/24 0229     Lipase 39 u/L     B-Type Natriuretic Peptide(BNP) [[id number]]  (Normal) Collected: 10/22/24 0106    Lab Status: Final result Specimen: Blood from Arm, Left Updated: 10/22/24 0151     BNP 8 pg/mL     HS Troponin 0hr (reflex protocol) [[id number]]  (Normal) Collected: 10/22/24 0106    Lab Status: Final result Specimen: Blood from Arm, Left Updated: 10/22/24 0143     hs TnI 0hr 6 ng/L     Comprehensive metabolic panel [[id number]]  (Abnormal) Collected: 10/22/24 0106    Lab Status: Final result Specimen: Blood from Arm, Left Updated: 10/22/24 0135     Sodium 137 mmol/L      Potassium 4.0 mmol/L      Chloride 102 mmol/L      CO2 28 mmol/L      ANION GAP 7 mmol/L      BUN 19 mg/dL      Creatinine 0.91 mg/dL      Glucose 110 mg/dL      Calcium 9.6 mg/dL      AST 99 U/L       U/L      Alkaline Phosphatase 92 U/L      Total Protein 7.3 g/dL      Albumin 4.2 g/dL      Total Bilirubin 0.54  mg/dL      eGFR 88 ml/min/1.73sq m     Narrative:      National Kid[employer] Foundation guidelines for Chronic Kidney Disease (CKD):     Stage 1 with normal or high GFR (GFR > 90 mL/min/1.73 square meters)    Stage 2 Mild CKD (GFR = 60-89 mL/min/1.73 square meters)    Stage 3A Moderate CKD (GFR = 45-59 mL/min/1.73 square meters)    Stage 3B Moderate CKD (GFR = 30-44 mL/min/1.73 square meters)    Stage 4 Severe CKD (GFR = 15-29 mL/min/1.73 square meters)    Stage 5 End Stage CKD (GFR <15 mL/min/1.73 square meters)  Note: GFR calculation is accurate only with a steady state creatinine    CBC and differential [[id number]] Collected: 10/22/24 0106    Lab Status: Final result Specimen: Blood from Arm, Left Updated: 10/22/24 0118     WBC 6.63 Thousand/uL      RBC 4.50 Million/uL      Hemoglobin 13.5 g/dL      Hematocrit 39.5 %      MCV 88 fL      MCH 30.0 pg      MCHC 34.2 g/dL      RDW 13.1 %      MPV 8.9 fL      Platelets 368 Thousands/uL      nRBC 0 /100 WBCs      Segmented % 55 %      Immature Grans % 0 %      Lymphocytes % 32 %      Monocytes % 12 %      Eosinophils Relative 0 %      Basophils Relative 1 %      Absolute Neutrophils 3.66 Thousands/µL      Absolute Immature Grans 0.02 Thousand/uL      Absolute Lymphocytes 2.12 Thousands/µL      Absolute Monocytes 0.77 Thousand/µL      Eosinophils Absolute 0.01 Thousand/µL      Basophils Absolute 0.05 Thousands/µL             XR chest 1 view portable    (Results Pending)       Procedures    ED Medication and Procedure Management   Prior to Admission Medications   Prescriptions Last Dose Informant Patient Reported? Taking?   Cholecalciferol (VITAMIN D3) 5000 units CAPS  Self Yes No   Sig: Take 1 capsule by mouth daily     Multiple Vitamin (multivitamin) capsule  Self Yes No   Sig: Take 1 capsule by mouth daily   Omega-3 Fatty Acids (FISH OIL PO)  Self Yes No   Sig: Take by mouth daily   Psyllium (METAMUCIL FIBER SINGLES PO)  Self Yes No   Sig: Take by mouth in the  morning   benzonatate (TESSALON) 200 MG capsule   No No   Sig: Take 1 capsule (200 mg total) by mouth 3 (three) times a day as needed for cough   lisinopril (ZESTRIL) 20 mg tablet   No No   Sig: TAKE 1 TABLET BY MOUTH EVERY DAY   meloxicam (MOBIC) 15 mg tablet   No No   Sig: TAKE 1 TABLET BY MOUTH DAILY AS NEEDED FOR MODERATE PAIN.   Patient not taking: Reported on 4/5/2024   methocarbamol (ROBAXIN) 500 mg tablet   No No   Sig: TAKE 1 TABLET (500 MG TOTAL) BY MOUTH DAILY AT BEDTIME AS NEEDED FOR MUSCLE SPASMS   Patient not taking: Reported on 4/5/2024   omeprazole (PriLOSEC) 20 mg delayed release capsule   No No   Sig: Take 1 capsule (20 mg total) by mouth daily   promethazine-dextromethorphan (PHENERGAN-DM) 6.25-15 mg/5 mL oral syrup   No No   Sig: Take 5 mL by mouth 4 (four) times a day as needed for cough   simvastatin (ZOCOR) 40 mg tablet   No No   Sig: TAKE 1 TABLET BY MOUTH EVERY DAY      Facility-Administered Medications: None     Patient's Medications   Discharge Prescriptions    No medications on file     No discharge procedures on file.  ED SEPSIS DOCUMENTATION   Time reflects when diagnosis was documented in both MDM as applicable and the Disposition within this note       Time User Action Codes Description Comment    10/22/2024  3:43 AM Jacinto Porter Add [R07.9] Chest pain, unspecified

## 2024-10-22 NOTE — Clinical Note
Isaiah Zuñiga was seen and treated in our emergency department on 10/22/2024.                Diagnosis:     Isaiah  may return to work on return date.    He may return on this date: 10/23/2024         If you have any questions or concerns, please don't hesitate to call.      Jacinto Porter MD    ______________________________           _______________          _______________  Hospital Representative                              Date                                Time

## 2024-10-23 ENCOUNTER — APPOINTMENT (OUTPATIENT)
Dept: LAB | Facility: MEDICAL CENTER | Age: 64
End: 2024-10-23
Payer: COMMERCIAL

## 2024-10-23 ENCOUNTER — OFFICE VISIT (OUTPATIENT)
Dept: FAMILY MEDICINE CLINIC | Facility: CLINIC | Age: 64
End: 2024-10-23
Payer: COMMERCIAL

## 2024-10-23 VITALS
HEIGHT: 71 IN | WEIGHT: 206 LBS | SYSTOLIC BLOOD PRESSURE: 142 MMHG | TEMPERATURE: 97.5 F | RESPIRATION RATE: 16 BRPM | HEART RATE: 84 BPM | BODY MASS INDEX: 28.84 KG/M2 | OXYGEN SATURATION: 97 % | DIASTOLIC BLOOD PRESSURE: 78 MMHG

## 2024-10-23 DIAGNOSIS — R07.9 CHEST PAIN, UNSPECIFIED TYPE: Primary | ICD-10-CM

## 2024-10-23 DIAGNOSIS — R79.89 ELEVATED LFTS: ICD-10-CM

## 2024-10-23 DIAGNOSIS — R10.11 RIGHT UPPER QUADRANT ABDOMINAL PAIN: ICD-10-CM

## 2024-10-23 DIAGNOSIS — K21.9 GASTROESOPHAGEAL REFLUX DISEASE WITHOUT ESOPHAGITIS: ICD-10-CM

## 2024-10-23 LAB
ALBUMIN SERPL BCG-MCNC: 4.1 G/DL (ref 3.5–5)
ALP SERPL-CCNC: 92 U/L (ref 34–104)
ALT SERPL W P-5'-P-CCNC: 98 U/L (ref 7–52)
AST SERPL W P-5'-P-CCNC: 37 U/L (ref 13–39)
BILIRUB DIRECT SERPL-MCNC: 0.09 MG/DL (ref 0–0.2)
BILIRUB SERPL-MCNC: 0.48 MG/DL (ref 0.2–1)
HAV IGM SER QL: NORMAL
HBV CORE IGM SER QL: NORMAL
HBV SURFACE AG SER QL: NORMAL
HCV AB SER QL: NORMAL
PROT SERPL-MCNC: 7.3 G/DL (ref 6.4–8.4)

## 2024-10-23 PROCEDURE — 36415 COLL VENOUS BLD VENIPUNCTURE: CPT

## 2024-10-23 PROCEDURE — 80074 ACUTE HEPATITIS PANEL: CPT

## 2024-10-23 PROCEDURE — 80076 HEPATIC FUNCTION PANEL: CPT

## 2024-10-23 PROCEDURE — 99214 OFFICE O/P EST MOD 30 MIN: CPT | Performed by: FAMILY MEDICINE

## 2024-10-23 NOTE — PROGRESS NOTES
Assessment/Plan:   Discussed diagnostic and treatment options with patient.  Patient being sent for labs for hepatic function panel and acute hepatitis panel.  Will heed results.  Patient will be scheduled for abdominal ultrasound.  Patient will be scheduled for exercise stress test.  Patient to continue present treatment although will increase omeprazole to 20 mg twice daily with meals and may take an acids as needed.  Recommend increase fluids and light low-fat diet avoiding fried fatty foods and rest.  Return to the office in 1 to 2 weeks or call sooner as needed or report to the emergency room for worsening symptoms.   Diagnoses and all orders for this visit:    Chest pain, unspecified type  -     Stress test only, exercise; Future    Right upper quadrant abdominal pain  -     Hepatic function panel; Future  -     Hepatitis panel, acute; Future  -     US abdomen complete; Future    Elevated LFTs  -     Hepatic function panel; Future  -     Hepatitis panel, acute; Future    Gastroesophageal reflux disease without esophagitis  -     omeprazole (PriLOSEC) 20 mg delayed release capsule; Take 1 capsule (20 mg total) by mouth 2 (two) times a day with meals          Subjective:     Patient ID: Isaiah Zuñiga is a 64 y.o. male.    Patient is being seen in follow-up from recent ER visit at [facility] from yesterday 10/22/2024 for chest pain and abdominal pain.  Patient had labs, EKG and a chest x-ray which ruled out for acute cardiac event and was discharged home.  Labs revealed elevated liver enzymes.  Patient initially developed substernal chest pain and heartburn while lying down in bed.  Chest pain not associate with physical exertion.  He treated this with Pepto-Bismol without relief and then went to the emergency room.  Yesterday patient developed epigastric and right upper quadrant abdominal pain which radiated to his back.  Patient developed nausea but no vomiting.  Appetite remains good and he denies  fever.  Patient admits to family history of heart disease.  Patient had negative stress test in 2021 and normal stress echo in 2014.    Chest Pain   This is a new problem. The current episode started in the past 7 days. The problem occurs intermittently. The pain is present in the substernal region. The quality of the pain is described as tightness and pressure. The pain does not radiate. Associated symptoms include abdominal pain, back pain and nausea. Pertinent negatives include no cough, diaphoresis, dizziness, exertional chest pressure, fever, hemoptysis, leg pain, lower extremity edema, near-syncope, orthopnea, palpitations, PND, shortness of breath, syncope or vomiting.   Abdominal Pain  This is a new problem. The current episode started in the past 7 days. The problem occurs constantly. The problem has been waxing and waning. The pain is located in the RUQ. The quality of the pain is aching. The abdominal pain radiates to the back. Associated symptoms include belching and nausea. Pertinent negatives include no anorexia, constipation, diarrhea, fever, flatus, hematochezia, melena, vomiting or weight loss. Associated symptoms comments: Heartburn  . The pain is aggravated by eating. The pain is relieved by Nothing. Treatments tried: pepto bismol. The treatment provided no relief. His past medical history is significant for GERD. There is no history of abdominal surgery, colon cancer, Crohn's disease, gallstones, irritable bowel syndrome, pancreatitis, PUD or ulcerative colitis.       Review of Systems   Constitutional:  Negative for diaphoresis, fever and weight loss.   Respiratory:  Negative for cough, hemoptysis and shortness of breath.    Cardiovascular:  Positive for chest pain. Negative for palpitations, orthopnea, syncope, PND and near-syncope.   Gastrointestinal:  Positive for abdominal pain and nausea. Negative for anorexia, constipation, diarrhea, flatus, hematochezia, melena and vomiting.    Musculoskeletal:  Positive for back pain.   Neurological:  Negative for dizziness.         Objective:     Physical Exam  Constitutional:       General: He is not in acute distress.     Appearance: Normal appearance. He is not ill-appearing, toxic-appearing or diaphoretic.   HENT:      Head: Normocephalic.      Mouth/Throat:      Mouth: Mucous membranes are moist.   Eyes:      General: No scleral icterus.     Conjunctiva/sclera: Conjunctivae normal.   Neck:      Vascular: No carotid bruit.   Cardiovascular:      Rate and Rhythm: Normal rate and regular rhythm.   Pulmonary:      Effort: Pulmonary effort is normal.      Breath sounds: Normal breath sounds.   Abdominal:      General: Bowel sounds are normal.      Palpations: Abdomen is soft.      Tenderness: There is no abdominal tenderness. There is no right CVA tenderness, left CVA tenderness, guarding or rebound.   Musculoskeletal:      Cervical back: Neck supple.      Right lower leg: No edema.      Left lower leg: No edema.   Lymphadenopathy:      Cervical: No cervical adenopathy.   Skin:     General: Skin is warm and dry.   Neurological:      General: No focal deficit present.      Mental Status: He is alert and oriented to person, place, and time.   Psychiatric:         Mood and Affect: Mood normal.         Behavior: Behavior normal.         Thought Content: Thought content normal.         Judgment: Judgment normal.

## 2024-10-24 ENCOUNTER — TELEPHONE (OUTPATIENT)
Dept: FAMILY MEDICINE CLINIC | Facility: CLINIC | Age: 64
End: 2024-10-24

## 2024-10-24 DIAGNOSIS — R79.89 ELEVATED LFTS: Primary | ICD-10-CM

## 2024-10-24 NOTE — TELEPHONE ENCOUNTER
Lvm for call back, ok to relay providers message when patient returns call.        Per Dr. Sanderson,  Liver enzymes are improved although remain mildly elevated.  Hepatitis profile is negative.  Continue present treatment and repeat liver panel in 1 to 2 weeks.

## 2024-10-24 NOTE — TELEPHONE ENCOUNTER
Caller: Patient    Doctor: Dr Sanderson    Reason for call:     Patient returning MA call, sent message for a call back.    Call back#: [phone]

## 2024-10-29 ENCOUNTER — HOSPITAL ENCOUNTER (OUTPATIENT)
Dept: NON INVASIVE DIAGNOSTICS | Facility: HOSPITAL | Age: 64
Discharge: HOME | End: 2024-10-29
Payer: COMMERCIAL

## 2024-10-29 DIAGNOSIS — R07.9 CHEST PAIN, UNSPECIFIED TYPE: ICD-10-CM

## 2024-10-29 LAB
CHEST PAIN STATEMENT: NORMAL
MAX DIASTOLIC BP: 74 MMHG
MAX HR PERCENT: 101 %
MAX HR: 157 BPM
MAX PREDICTED HEART RATE: 155 BPM
PROTOCOL NAME: NORMAL
RATE PRESSURE PRODUCT: NORMAL
SL CV STRESS RECOVERY BP: NORMAL MMHG
SL CV STRESS RECOVERY HR: 98 BPM
SL CV STRESS RECOVERY O2 SAT: 99 %
SL CV STRESS STAGE REACHED: 3
STRESS ANGINA INDEX: 0
STRESS BASELINE BP: NORMAL MMHG
STRESS BASELINE HR: 81 BPM
STRESS O2 SAT REST: 98 %
STRESS PEAK HR: 157 BPM
STRESS POST ESTIMATED WORKLOAD: 10.1 METS
STRESS POST EXERCISE DUR MIN: 9 MIN
STRESS POST EXERCISE DUR MIN: 9 MIN
STRESS POST EXERCISE DUR SEC: 0 SEC
STRESS POST EXERCISE DUR SEC: 0 SEC
STRESS POST O2 SAT PEAK: 97 %
STRESS POST PEAK BP: 208 MMHG
STRESS POST PEAK HR: 157 BPM
STRESS POST PEAK SYSTOLIC BP: 208 MMHG
TARGET HR FORMULA: NORMAL
TEST INDICATION: NORMAL

## 2024-10-29 PROCEDURE — 93018 CV STRESS TEST I&R ONLY: CPT | Performed by: INTERNAL MEDICINE

## 2024-10-29 PROCEDURE — 93017 CV STRESS TEST TRACING ONLY: CPT

## 2024-10-29 PROCEDURE — 93016 CV STRESS TEST SUPVJ ONLY: CPT | Performed by: INTERNAL MEDICINE

## 2024-10-30 ENCOUNTER — HOSPITAL ENCOUNTER (OUTPATIENT)
Dept: ULTRASOUND IMAGING | Facility: HOSPITAL | Age: 64
Discharge: HOME | End: 2024-10-30
Payer: COMMERCIAL

## 2024-10-30 DIAGNOSIS — R10.11 RIGHT UPPER QUADRANT ABDOMINAL PAIN: ICD-10-CM

## 2024-10-30 PROCEDURE — 76700 US EXAM ABDOM COMPLETE: CPT

## 2024-11-01 ENCOUNTER — APPOINTMENT (OUTPATIENT)
Dept: LAB | Facility: MEDICAL CENTER | Age: 64
End: 2024-11-01
Payer: COMMERCIAL

## 2024-11-01 DIAGNOSIS — R79.89 ELEVATED LFTS: ICD-10-CM

## 2024-11-01 LAB
ALBUMIN SERPL BCG-MCNC: 4.2 G/DL (ref 3.5–5)
ALP SERPL-CCNC: 74 U/L (ref 34–104)
ALT SERPL W P-5'-P-CCNC: 32 U/L (ref 7–52)
AST SERPL W P-5'-P-CCNC: 26 U/L (ref 13–39)
BILIRUB DIRECT SERPL-MCNC: 0.15 MG/DL (ref 0–0.2)
BILIRUB SERPL-MCNC: 0.72 MG/DL (ref 0.2–1)
PROT SERPL-MCNC: 7.8 G/DL (ref 6.4–8.4)

## 2024-11-01 PROCEDURE — 80076 HEPATIC FUNCTION PANEL: CPT

## 2024-11-01 PROCEDURE — 36415 COLL VENOUS BLD VENIPUNCTURE: CPT

## 2024-11-05 ENCOUNTER — OFFICE VISIT (OUTPATIENT)
Dept: FAMILY MEDICINE CLINIC | Facility: CLINIC | Age: 64
End: 2024-11-05
Payer: COMMERCIAL

## 2024-11-05 VITALS
DIASTOLIC BLOOD PRESSURE: 88 MMHG | RESPIRATION RATE: 16 BRPM | HEIGHT: 71 IN | TEMPERATURE: 97.8 F | WEIGHT: 204 LBS | HEART RATE: 88 BPM | OXYGEN SATURATION: 98 % | SYSTOLIC BLOOD PRESSURE: 140 MMHG | BODY MASS INDEX: 28.56 KG/M2

## 2024-11-05 DIAGNOSIS — K80.20 CALCULUS OF GALLBLADDER WITHOUT CHOLECYSTITIS WITHOUT OBSTRUCTION: ICD-10-CM

## 2024-11-05 DIAGNOSIS — I10 PRIMARY HYPERTENSION: ICD-10-CM

## 2024-11-05 DIAGNOSIS — Z23 ENCOUNTER FOR IMMUNIZATION: ICD-10-CM

## 2024-11-05 DIAGNOSIS — Z00.00 ANNUAL PHYSICAL EXAM: Primary | ICD-10-CM

## 2024-11-05 DIAGNOSIS — K21.9 GASTROESOPHAGEAL REFLUX DISEASE WITHOUT ESOPHAGITIS: ICD-10-CM

## 2024-11-05 DIAGNOSIS — Z12.11 COLON CANCER SCREENING: ICD-10-CM

## 2024-11-05 DIAGNOSIS — E23.0 HYPOGONADOTROPIC HYPOGONADISM (HCC): ICD-10-CM

## 2024-11-05 DIAGNOSIS — E78.2 MIXED HYPERLIPIDEMIA: ICD-10-CM

## 2024-11-05 PROCEDURE — 90662 IIV NO PRSV INCREASED AG IM: CPT

## 2024-11-05 PROCEDURE — 99397 PER PM REEVAL EST PAT 65+ YR: CPT | Performed by: FAMILY MEDICINE

## 2024-11-05 PROCEDURE — 90471 IMMUNIZATION ADMIN: CPT

## 2024-11-05 NOTE — PROGRESS NOTES
Assessment/Plan:   Patient received high-dose flu vaccine today.  Discussed Prevnar 20 pneumonia vaccine.  Patient is being referred to gastroenterology for screening colonoscopy and follow-up of cholelithiasis.  Discussed referral to general surgeon if abdominal pain symptoms reoccur.  Patient to continue present treatment.  Instructed to follow a low-fat, low-salt and a low carbohydrate diet and get regular aerobic exercise 150 minutes/week and weight training.  Return to the office in 6 months.   Diagnoses and all orders for this visit:    Annual physical exam    Primary hypertension    Mixed hyperlipidemia    Calculus of gallbladder without cholecystitis without obstruction  -     Ambulatory Referral to Gastroenterology; Future    Gastroesophageal reflux disease without esophagitis    Hypogonadotropic hypogonadism (HCC)    Encounter for immunization  -     influenza vaccine, high-dose, PF 0.5 mL (Fluzone High Dose)    Colon cancer screening  -     Ambulatory Referral to Gastroenterology; Future          Subjective:     Patient ID: Isaiah Zuñiga is a 64 y.o. male.    Patient is here with his wife for annual physical exam and follow-up of chronic conditions.  Patient requests yearly flu vaccine.  He declines COVID-19 booster.  Discussed Prevnar 20 pneumonia vaccine and patient will consider it.  We discussed recent labs and abdominal ultrasound and stress test results.  Patient is feeling better overall with no further abdominal or chest pains.  Patient is retired.  Patient exercises regularly.  Patient is overdue for follow-up colonoscopy having had 1 done in 2012 and was recommended to repeat in 5 years.        Review of Systems   Constitutional:  Negative for activity change, appetite change, chills, diaphoresis, fatigue, fever and unexpected weight change.   HENT: Negative.     Eyes: Negative.    Respiratory:  Negative for cough, chest tightness, shortness of breath and wheezing.    Cardiovascular:  Negative  for chest pain, palpitations and leg swelling.   Gastrointestinal:  Negative for abdominal pain, blood in stool, constipation, diarrhea, nausea and vomiting.   Endocrine: Negative for cold intolerance and heat intolerance.   Genitourinary:  Negative for difficulty urinating, dysuria, frequency and hematuria.   Musculoskeletal: Negative.    Skin: Negative.    Neurological:  Negative for dizziness, syncope, weakness, light-headedness and headaches.   Hematological:  Negative for adenopathy. Does not bruise/bleed easily.   Psychiatric/Behavioral:  Negative for decreased concentration, dysphoric mood and sleep disturbance. The patient is not nervous/anxious.          Objective:     Physical Exam  Constitutional:       General: He is not in acute distress.     Appearance: Normal appearance.   HENT:      Head: Normocephalic.      Mouth/Throat:      Mouth: Mucous membranes are moist.   Eyes:      General: No scleral icterus.     Conjunctiva/sclera: Conjunctivae normal.   Neck:      Vascular: No carotid bruit.   Cardiovascular:      Rate and Rhythm: Normal rate and regular rhythm.   Pulmonary:      Effort: Pulmonary effort is normal.      Breath sounds: Normal breath sounds.   Abdominal:      Palpations: Abdomen is soft.      Tenderness: There is no abdominal tenderness.   Musculoskeletal:      Cervical back: Neck supple.      Right lower leg: No edema.      Left lower leg: No edema.   Lymphadenopathy:      Cervical: No cervical adenopathy.   Skin:     General: Skin is warm and dry.   Neurological:      General: No focal deficit present.      Mental Status: He is alert and oriented to person, place, and time.   Psychiatric:         Mood and Affect: Mood normal.         Behavior: Behavior normal.         Thought Content: Thought content normal.         Judgment: Judgment normal.

## 2025-01-29 DIAGNOSIS — K21.9 GASTROESOPHAGEAL REFLUX DISEASE WITHOUT ESOPHAGITIS: ICD-10-CM

## 2025-02-25 DIAGNOSIS — E78.5 HYPERLIPIDEMIA, UNSPECIFIED HYPERLIPIDEMIA TYPE: ICD-10-CM

## 2025-02-25 DIAGNOSIS — I10 ESSENTIAL HYPERTENSION: ICD-10-CM

## 2025-02-26 RX ORDER — LISINOPRIL 20 MG/1
20 TABLET ORAL DAILY
Qty: 30 TABLET | Refills: 5 | Status: SHIPPED | OUTPATIENT
Start: 2025-02-26

## 2025-02-26 RX ORDER — SIMVASTATIN 40 MG
40 TABLET ORAL DAILY
Qty: 30 TABLET | Refills: 0 | Status: SHIPPED | OUTPATIENT
Start: 2025-02-26

## 2025-03-22 DIAGNOSIS — E78.5 HYPERLIPIDEMIA, UNSPECIFIED HYPERLIPIDEMIA TYPE: ICD-10-CM

## 2025-03-24 RX ORDER — SIMVASTATIN 40 MG
40 TABLET ORAL DAILY
Qty: 30 TABLET | Refills: 0 | Status: SHIPPED | OUTPATIENT
Start: 2025-03-24

## 2025-04-20 DIAGNOSIS — E78.5 HYPERLIPIDEMIA, UNSPECIFIED HYPERLIPIDEMIA TYPE: ICD-10-CM

## 2025-04-21 RX ORDER — SIMVASTATIN 40 MG
40 TABLET ORAL DAILY
Qty: 30 TABLET | Refills: 0 | Status: SHIPPED | OUTPATIENT
Start: 2025-04-21

## 2025-05-18 DIAGNOSIS — E78.5 HYPERLIPIDEMIA, UNSPECIFIED HYPERLIPIDEMIA TYPE: ICD-10-CM

## 2025-05-19 RX ORDER — SIMVASTATIN 40 MG
40 TABLET ORAL DAILY
Qty: 30 TABLET | Refills: 0 | Status: SHIPPED | OUTPATIENT
Start: 2025-05-19 | End: 2025-05-22 | Stop reason: SDUPTHER

## 2025-05-19 NOTE — TELEPHONE ENCOUNTER
Patient called requesting refill for simvastatin . Patient made aware medication was refilled on 5/19/2025 for 30 with 0 refills to [facility]. Patient instructed to contact the pharmacy and speak with someone directly to obtain refills of medication. Patient advised to call back for refill if their pharmacy is unable to assist them. Patient verbalized understanding.

## 2025-05-22 ENCOUNTER — OFFICE VISIT (OUTPATIENT)
Dept: FAMILY MEDICINE CLINIC | Facility: CLINIC | Age: 65
End: 2025-05-22
Payer: COMMERCIAL

## 2025-05-22 ENCOUNTER — APPOINTMENT (OUTPATIENT)
Dept: LAB | Facility: MEDICAL CENTER | Age: 65
End: 2025-05-22
Payer: COMMERCIAL

## 2025-05-22 VITALS
WEIGHT: 202 LBS | DIASTOLIC BLOOD PRESSURE: 88 MMHG | BODY MASS INDEX: 28.28 KG/M2 | SYSTOLIC BLOOD PRESSURE: 132 MMHG | TEMPERATURE: 96.7 F | RESPIRATION RATE: 16 BRPM | HEIGHT: 71 IN | HEART RATE: 72 BPM | OXYGEN SATURATION: 98 %

## 2025-05-22 DIAGNOSIS — Z12.5 SCREENING PSA (PROSTATE SPECIFIC ANTIGEN): ICD-10-CM

## 2025-05-22 DIAGNOSIS — E55.9 VITAMIN D DEFICIENCY: ICD-10-CM

## 2025-05-22 DIAGNOSIS — E78.5 HYPERLIPIDEMIA, UNSPECIFIED HYPERLIPIDEMIA TYPE: ICD-10-CM

## 2025-05-22 DIAGNOSIS — I10 PRIMARY HYPERTENSION: Primary | ICD-10-CM

## 2025-05-22 DIAGNOSIS — K21.9 GASTROESOPHAGEAL REFLUX DISEASE WITHOUT ESOPHAGITIS: ICD-10-CM

## 2025-05-22 DIAGNOSIS — I10 PRIMARY HYPERTENSION: ICD-10-CM

## 2025-05-22 PROBLEM — G47.00 INSOMNIA: Status: RESOLVED | Noted: 2021-08-30 | Resolved: 2025-05-22

## 2025-05-22 PROBLEM — R53.83 FATIGUE: Status: RESOLVED | Noted: 2021-03-03 | Resolved: 2025-05-22

## 2025-05-22 LAB
25(OH)D3 SERPL-MCNC: 100 NG/ML (ref 30–100)
ALBUMIN SERPL BCG-MCNC: 4.7 G/DL (ref 3.5–5)
ALP SERPL-CCNC: 60 U/L (ref 34–104)
ALT SERPL W P-5'-P-CCNC: 34 U/L (ref 7–52)
ANION GAP SERPL CALCULATED.3IONS-SCNC: 11 MMOL/L (ref 4–13)
AST SERPL W P-5'-P-CCNC: 30 U/L (ref 13–39)
BACTERIA UR QL AUTO: ABNORMAL /HPF
BILIRUB SERPL-MCNC: 0.73 MG/DL (ref 0.2–1)
BILIRUB UR QL STRIP: NEGATIVE
BUN SERPL-MCNC: 17 MG/DL (ref 5–25)
CALCIUM SERPL-MCNC: 10.3 MG/DL (ref 8.4–10.2)
CHLORIDE SERPL-SCNC: 102 MMOL/L (ref 96–108)
CHOLEST SERPL-MCNC: 160 MG/DL (ref ?–200)
CLARITY UR: ABNORMAL
CO2 SERPL-SCNC: 26 MMOL/L (ref 21–32)
COLOR UR: YELLOW
CREAT SERPL-MCNC: 0.93 MG/DL (ref 0.6–1.3)
ERYTHROCYTE [DISTWIDTH] IN BLOOD BY AUTOMATED COUNT: 13.2 % (ref 11.6–15.1)
GFR SERPL CREATININE-BSD FRML MDRD: 85 ML/MIN/1.73SQ M
GLUCOSE P FAST SERPL-MCNC: 105 MG/DL (ref 65–99)
GLUCOSE UR STRIP-MCNC: NEGATIVE MG/DL
HCT VFR BLD AUTO: 45.8 % (ref 36.5–49.3)
HDLC SERPL-MCNC: 55 MG/DL
HGB BLD-MCNC: 15.2 G/DL (ref 12–17)
HGB UR QL STRIP.AUTO: ABNORMAL
KETONES UR STRIP-MCNC: NEGATIVE MG/DL
LDLC SERPL CALC-MCNC: 71 MG/DL (ref 0–100)
LEUKOCYTE ESTERASE UR QL STRIP: ABNORMAL
MCH RBC QN AUTO: 30.2 PG (ref 26.8–34.3)
MCHC RBC AUTO-ENTMCNC: 33.2 G/DL (ref 31.4–37.4)
MCV RBC AUTO: 91 FL (ref 82–98)
NITRITE UR QL STRIP: NEGATIVE
NON-SQ EPI CELLS URNS QL MICRO: ABNORMAL /HPF
PH UR STRIP.AUTO: 6 [PH]
PLATELET # BLD AUTO: 466 THOUSANDS/UL (ref 149–390)
PMV BLD AUTO: 9.2 FL (ref 8.9–12.7)
POTASSIUM SERPL-SCNC: 5.5 MMOL/L (ref 3.5–5.3)
PROT SERPL-MCNC: 8.2 G/DL (ref 6.4–8.4)
PROT UR STRIP-MCNC: ABNORMAL MG/DL
PSA SERPL-MCNC: 1.17 NG/ML (ref 0–4)
RBC # BLD AUTO: 5.04 MILLION/UL (ref 3.88–5.62)
RBC #/AREA URNS AUTO: ABNORMAL /HPF
SODIUM SERPL-SCNC: 139 MMOL/L (ref 135–147)
SP GR UR STRIP.AUTO: 1.03 (ref 1–1.03)
TRIGL SERPL-MCNC: 171 MG/DL (ref ?–150)
TSH SERPL DL<=0.05 MIU/L-ACNC: 1.93 UIU/ML (ref 0.45–4.5)
UROBILINOGEN UR STRIP-ACNC: <2 MG/DL
WBC # BLD AUTO: 6.66 THOUSAND/UL (ref 4.31–10.16)
WBC #/AREA URNS AUTO: ABNORMAL /HPF

## 2025-05-22 PROCEDURE — 87186 SC STD MICRODIL/AGAR DIL: CPT

## 2025-05-22 PROCEDURE — 80061 LIPID PANEL: CPT

## 2025-05-22 PROCEDURE — 87077 CULTURE AEROBIC IDENTIFY: CPT

## 2025-05-22 PROCEDURE — 81001 URINALYSIS AUTO W/SCOPE: CPT

## 2025-05-22 PROCEDURE — 82306 VITAMIN D 25 HYDROXY: CPT

## 2025-05-22 PROCEDURE — 99214 OFFICE O/P EST MOD 30 MIN: CPT | Performed by: FAMILY MEDICINE

## 2025-05-22 PROCEDURE — 80053 COMPREHEN METABOLIC PANEL: CPT

## 2025-05-22 PROCEDURE — 85027 COMPLETE CBC AUTOMATED: CPT

## 2025-05-22 PROCEDURE — 84443 ASSAY THYROID STIM HORMONE: CPT

## 2025-05-22 PROCEDURE — 36415 COLL VENOUS BLD VENIPUNCTURE: CPT

## 2025-05-22 PROCEDURE — 87086 URINE CULTURE/COLONY COUNT: CPT

## 2025-05-22 PROCEDURE — G0103 PSA SCREENING: HCPCS

## 2025-05-22 RX ORDER — SIMVASTATIN 40 MG
40 TABLET ORAL DAILY
Qty: 30 TABLET | Refills: 5 | Status: SHIPPED | OUTPATIENT
Start: 2025-05-22

## 2025-05-22 NOTE — ASSESSMENT & PLAN NOTE
Fasting lipid panel ordered.  Lipids well-controlled on simvastatin 40 mg daily.  Continue present treatment and follow a low-fat low-cholesterol diet.  Orders:    simvastatin (ZOCOR) 40 mg tablet; Take 1 tablet (40 mg total) by mouth daily    Comprehensive metabolic panel; Future    Lipid Panel with Direct LDL reflex; Future

## 2025-05-22 NOTE — PROGRESS NOTES
:  Assessment & Plan  Primary hypertension  Blood pressure remains controlled on lisinopril 20 mg daily.  Continue present treatment and follow a low-salt diet and regular aerobic exercise 150 minutes/week.  Orders:    CBC; Future    Comprehensive metabolic panel; Future    TSH, 3rd generation with Free T4 reflex; Future    UA w Reflex to Microscopic w Reflex to Culture; Future    Hyperlipidemia, unspecified hyperlipidemia type  Fasting lipid panel ordered.  Lipids well-controlled on simvastatin 40 mg daily.  Continue present treatment and follow a low-fat low-cholesterol diet.  Orders:    simvastatin (ZOCOR) 40 mg tablet; Take 1 tablet (40 mg total) by mouth daily    Comprehensive metabolic panel; Future    Lipid Panel with Direct LDL reflex; Future    Gastroesophageal reflux disease without esophagitis  Reflux symptoms trolls on omeprazole 20 mg daily.  Continue present treatment.       Vitamin D deficiency  Continue vitamin D supplement.  Orders:    Vitamin D 25 hydroxy; Future    Screening PSA (prostate specific antigen)    Orders:    PSA, Total Screen; Future        History of Present Illness     Isaiah Zuñiga is a 65 y.o. male   Patient is here for follow-up appoint for chronic conditions and due for fasting labs.  Patient is feeling well overall in jail and exercises 3 days a week doing cardio and weight training for 1 hour.  Patient had colon[facility] in 2012 [facility] and declines follow-up colonoscopy.  He also declines Cologuard testing at this time.  Patient denies family history of colon cancer and no change in bowel movements.  Patient denies any further abdominal or chest pain.  Blood pressure at home in the range of 120-130/70-80.    Hypertension  This is a chronic problem. The problem is controlled. Pertinent negatives include no anxiety, blurred vision, chest pain, headaches, orthopnea, palpitations, peripheral edema, PND or shortness of breath. Risk factors for coronary artery  "disease include dyslipidemia, male gender and family history. Past treatments include ACE inhibitors. The current treatment provides significant improvement. There are no compliance problems.  There is no history of CAD/MI or CVA.     Review of Systems   Eyes:  Negative for blurred vision.   Respiratory:  Negative for shortness of breath.    Cardiovascular:  Negative for chest pain, palpitations, orthopnea and PND.   Neurological:  Negative for headaches.     Objective   /88 (BP Location: Left arm, Patient Position: Sitting, Cuff Size: Standard)   Pulse 72   Temp (!) 96.7 °F (35.9 °C) (Tympanic)   Resp 16   Ht 5' 11\" (1.803 m)   Wt 91.6 kg (202 lb)   SpO2 98%   BMI 28.17 kg/m²      Physical Exam  Constitutional:       General: He is not in acute distress.     Appearance: Normal appearance.   HENT:      Head: Normocephalic.      Mouth/Throat:      Mouth: Mucous membranes are moist.     Eyes:      General: No scleral icterus.     Conjunctiva/sclera: Conjunctivae normal.     Neck:      Vascular: No carotid bruit.     Cardiovascular:      Rate and Rhythm: Normal rate and regular rhythm.   Pulmonary:      Effort: Pulmonary effort is normal.      Breath sounds: Normal breath sounds.   Abdominal:      Palpations: Abdomen is soft.      Tenderness: There is no abdominal tenderness.     Musculoskeletal:      Cervical back: Neck supple.      Right lower leg: No edema.      Left lower leg: No edema.   Lymphadenopathy:      Cervical: No cervical adenopathy.     Skin:     General: Skin is warm and dry.     Neurological:      General: No focal deficit present.      Mental Status: He is alert and oriented to person, place, and time.     Psychiatric:         Mood and Affect: Mood normal.         Behavior: Behavior normal.         Thought Content: Thought content normal.         Judgment: Judgment normal.           "

## 2025-05-22 NOTE — ASSESSMENT & PLAN NOTE
Blood pressure remains controlled on lisinopril 20 mg daily.  Continue present treatment and follow a low-salt diet and regular aerobic exercise 150 minutes/week.  Orders:    CBC; Future    Comprehensive metabolic panel; Future    TSH, 3rd generation with Free T4 reflex; Future    UA w Reflex to Microscopic w Reflex to Culture; Future

## 2025-05-24 LAB — BACTERIA UR CULT: ABNORMAL

## 2025-05-25 ENCOUNTER — RESULTS FOLLOW-UP (OUTPATIENT)
Dept: FAMILY MEDICINE CLINIC | Facility: CLINIC | Age: 65
End: 2025-05-25

## 2025-05-25 DIAGNOSIS — N39.0 URINARY TRACT INFECTION WITHOUT HEMATURIA, SITE UNSPECIFIED: Primary | ICD-10-CM

## 2025-05-25 DIAGNOSIS — E83.52 HYPERCALCEMIA: ICD-10-CM

## 2025-05-25 DIAGNOSIS — E87.5 HYPERKALEMIA: ICD-10-CM

## 2025-05-25 RX ORDER — SULFAMETHOXAZOLE AND TRIMETHOPRIM 800; 160 MG/1; MG/1
1 TABLET ORAL 2 TIMES DAILY
Qty: 14 TABLET | Refills: 0 | Status: SHIPPED | OUTPATIENT
Start: 2025-05-25 | End: 2025-06-01

## 2025-06-10 ENCOUNTER — APPOINTMENT (OUTPATIENT)
Dept: LAB | Facility: MEDICAL CENTER | Age: 65
End: 2025-06-10
Payer: COMMERCIAL

## 2025-06-10 LAB
ALBUMIN SERPL BCG-MCNC: 4.4 G/DL (ref 3.5–5)
ALP SERPL-CCNC: 54 U/L (ref 34–104)
ALT SERPL W P-5'-P-CCNC: 32 U/L (ref 7–52)
ANION GAP SERPL CALCULATED.3IONS-SCNC: 5 MMOL/L (ref 4–13)
AST SERPL W P-5'-P-CCNC: 24 U/L (ref 13–39)
BILIRUB SERPL-MCNC: 0.45 MG/DL (ref 0.2–1)
BUN SERPL-MCNC: 21 MG/DL (ref 5–25)
CALCIUM SERPL-MCNC: 9.6 MG/DL (ref 8.4–10.2)
CHLORIDE SERPL-SCNC: 102 MMOL/L (ref 96–108)
CO2 SERPL-SCNC: 29 MMOL/L (ref 21–32)
CREAT SERPL-MCNC: 0.96 MG/DL (ref 0.6–1.3)
GFR SERPL CREATININE-BSD FRML MDRD: 82 ML/MIN/1.73SQ M
GLUCOSE P FAST SERPL-MCNC: 99 MG/DL (ref 65–99)
POTASSIUM SERPL-SCNC: 4.8 MMOL/L (ref 3.5–5.3)
PROT SERPL-MCNC: 7.4 G/DL (ref 6.4–8.4)
SODIUM SERPL-SCNC: 136 MMOL/L (ref 135–147)

## 2025-06-11 ENCOUNTER — RESULTS FOLLOW-UP (OUTPATIENT)
Dept: FAMILY MEDICINE CLINIC | Facility: CLINIC | Age: 65
End: 2025-06-11

## 2025-06-12 NOTE — ASSESSMENT & PLAN NOTE
Symptoms completely resolved  Increase fluids and discussed importance of good hydration  PRINCIPAL PROCEDURE  Procedure: Robot-assisted cholecystectomy  Findings and Treatment:

## 2025-07-01 ENCOUNTER — OFFICE VISIT (OUTPATIENT)
Dept: FAMILY MEDICINE CLINIC | Facility: CLINIC | Age: 65
End: 2025-07-01
Payer: COMMERCIAL

## 2025-07-01 VITALS
WEIGHT: 205.4 LBS | HEART RATE: 80 BPM | OXYGEN SATURATION: 99 % | TEMPERATURE: 96.4 F | SYSTOLIC BLOOD PRESSURE: 118 MMHG | RESPIRATION RATE: 16 BRPM | BODY MASS INDEX: 28.76 KG/M2 | DIASTOLIC BLOOD PRESSURE: 84 MMHG | HEIGHT: 71 IN

## 2025-07-01 DIAGNOSIS — N39.0 URINARY TRACT INFECTION WITHOUT HEMATURIA, SITE UNSPECIFIED: ICD-10-CM

## 2025-07-01 DIAGNOSIS — M54.50 ACUTE BILATERAL LOW BACK PAIN WITHOUT SCIATICA: Primary | ICD-10-CM

## 2025-07-01 LAB
BACTERIA UR QL AUTO: NORMAL /HPF
BILIRUB UR QL STRIP: NEGATIVE
CLARITY UR: CLEAR
COLOR UR: YELLOW
GLUCOSE UR STRIP-MCNC: NEGATIVE MG/DL
HGB UR QL STRIP.AUTO: NEGATIVE
KETONES UR STRIP-MCNC: NEGATIVE MG/DL
LEUKOCYTE ESTERASE UR QL STRIP: NEGATIVE
NITRITE UR QL STRIP: NEGATIVE
NON-SQ EPI CELLS URNS QL MICRO: NORMAL /HPF
PH UR STRIP.AUTO: 6.5 [PH]
PROT UR STRIP-MCNC: ABNORMAL MG/DL
RBC #/AREA URNS AUTO: NORMAL /HPF
SP GR UR STRIP.AUTO: 1.02 (ref 1–1.03)
UROBILINOGEN UR STRIP-ACNC: <2 MG/DL
WBC #/AREA URNS AUTO: NORMAL /HPF

## 2025-07-01 PROCEDURE — 81001 URINALYSIS AUTO W/SCOPE: CPT | Performed by: FAMILY MEDICINE

## 2025-07-01 PROCEDURE — 99214 OFFICE O/P EST MOD 30 MIN: CPT | Performed by: FAMILY MEDICINE

## 2025-07-01 NOTE — PROGRESS NOTES
:  Assessment & Plan  Acute bilateral low back pain without sciatica  Probably musculoskeletal pain.  Patient sent for x-ray lumbar spine.  Will heed results.  Recommend stretching exercises and core exercises.  May take Aleve as needed.  Return to the office as needed.  Orders:    UA w Reflex to Microscopic w Reflex to Culture    XR spine lumbar minimum 4 views non injury; Future    Urinary tract infection without hematuria, site unspecified  Recheck UA with reflex to microscopic and culture.  Will heed results.  Recommend increase water intake to at least 64 ounces of water daily.  Orders:    UA w Reflex to Microscopic w Reflex to Culture    US kidney and bladder; Future        History of Present Illness     Isaiah Zuñiga is a 65 y.o. male   Week to 10 days patient complains of mild bilateral low back pain.  He denies any specific injury or fall.  He denies any radicular symptoms.  Patient has treated this with stretching and Aleve as needed with relief.  Patient recently treated for asymptomatic UTI.  He denies urinary symptoms presently.  Patient has tried increased water intake.  Patient is overdue for colonoscopy and again encouraged to schedule.    Back Pain  This is a new problem. The current episode started in the past 7 days. The problem occurs constantly. The problem is unchanged. The pain is present in the lumbar spine (bilat). The quality of the pain is described as aching. The pain does not radiate. The pain is at a severity of 2/10. The pain is The same all the time. The symptoms are aggravated by bending, lying down, sitting, standing and twisting. Stiffness is present All day. Pertinent negatives include no bladder incontinence, bowel incontinence, dysuria, fever, leg pain, numbness, pelvic pain, perianal numbness, tingling, weakness or weight loss. He has tried NSAIDs and home exercises for the symptoms. The treatment provided moderate relief.     Review of Systems   Constitutional:  Negative for  "fever and weight loss.   Gastrointestinal:  Negative for bowel incontinence.   Genitourinary:  Negative for bladder incontinence, dysuria and pelvic pain.   Musculoskeletal:  Positive for back pain.   Neurological:  Negative for tingling, weakness and numbness.     Objective   /84   Pulse 80   Temp (!) 96.4 °F (35.8 °C)   Resp 16   Ht 5' 11\" (1.803 m)   Wt 93.2 kg (205 lb 6.4 oz)   SpO2 99%   BMI 28.65 kg/m²      Physical Exam  Constitutional:       General: He is not in acute distress.     Appearance: Normal appearance.   HENT:      Head: Normocephalic.      Mouth/Throat:      Mouth: Mucous membranes are moist.     Eyes:      General: No scleral icterus.     Conjunctiva/sclera: Conjunctivae normal.       Cardiovascular:      Rate and Rhythm: Normal rate and regular rhythm.   Pulmonary:      Effort: Pulmonary effort is normal.      Breath sounds: Normal breath sounds.   Abdominal:      Palpations: Abdomen is soft.      Tenderness: There is no abdominal tenderness. There is no right CVA tenderness or left CVA tenderness.     Musculoskeletal:         General: Tenderness present.      Cervical back: Neck supple.      Right lower leg: No edema.      Left lower leg: No edema.      Comments: Mild bilateral lumbar paravertebral tenderness.  Negative straight leg raise bilaterally.   Lymphadenopathy:      Cervical: No cervical adenopathy.     Skin:     General: Skin is warm and dry.     Neurological:      General: No focal deficit present.      Mental Status: He is alert and oriented to person, place, and time.     Psychiatric:         Mood and Affect: Mood normal.         Behavior: Behavior normal.         Thought Content: Thought content normal.         Judgment: Judgment normal.           "

## 2025-07-03 ENCOUNTER — HOSPITAL ENCOUNTER (OUTPATIENT)
Dept: RADIOLOGY | Facility: IMAGING CENTER | Age: 65
End: 2025-07-03
Attending: FAMILY MEDICINE
Payer: COMMERCIAL

## 2025-07-03 DIAGNOSIS — N39.0 URINARY TRACT INFECTION WITHOUT HEMATURIA, SITE UNSPECIFIED: ICD-10-CM

## 2025-07-03 PROCEDURE — 76775 US EXAM ABDO BACK WALL LIM: CPT

## 2025-07-08 DIAGNOSIS — K21.9 GASTROESOPHAGEAL REFLUX DISEASE WITHOUT ESOPHAGITIS: ICD-10-CM

## 2025-07-08 DIAGNOSIS — N39.0 URINARY TRACT INFECTION WITHOUT HEMATURIA, SITE UNSPECIFIED: ICD-10-CM

## 2025-07-10 RX ORDER — OMEPRAZOLE 20 MG/1
20 CAPSULE, DELAYED RELEASE ORAL DAILY
Qty: 30 CAPSULE | Refills: 5 | Status: SHIPPED | OUTPATIENT
Start: 2025-07-10

## 2025-07-10 RX ORDER — SULFAMETHOXAZOLE AND TRIMETHOPRIM 800; 160 MG/1; MG/1
1 TABLET ORAL 2 TIMES DAILY
Qty: 14 TABLET | Refills: 0 | OUTPATIENT
Start: 2025-07-10 | End: 2025-07-17

## 2025-07-12 DIAGNOSIS — N32.89 BLADDER WALL THICKENING: Primary | ICD-10-CM

## 2025-07-15 NOTE — PROGRESS NOTES
7/17/2025      Chief Complaint   Patient presents with   • Follow-up     Bladder wall thickening         Assessment and Plan    65 y.o. male     1. Bladder wall thickening  Assessment & Plan:  Follow up to review renal US   Renal US- Mild nonspecific diffuse bladder wall trabeculation may be sequela of chronic partial bladder outlet obstruction or neurogenic bladder.  Reassurance provided regarding imaging today- diffuse bladder wall thickening- no masses of lesions   PVR- 25   UA- negative for significant microscopic blood and infection   Previous positive culture- only 1 from this year   Reports no issues with urination   No  medications   I recommend containing to monitor for worsening LUTS   If infections start to become recurrent can trial alpha blocker    Reviewed adequate hydration for UTI prevention and the importance of not holding his urine   Patient feels comfortable to follow up as needed   Orders:  -     POCT Measure PVR  -     Ambulatory Referral to Urology      History of Present Illness  Isaiah Zuñiga is a 65 y.o. male here for evaluation of renal ultrasound.  Patient was found to have a UTI in May of this year.  At that time he was having no urinary symptoms.  He was treated by his PCP.  Repeat urinalysis shows no significant microscopic blood or signs of infection.  Patient reports still no issues with urination.  Good urinary stream and feels that he adequately empties.  Nocturia x 1 if anything.  Around that time he noted that he was holding his urine.  Has been trying to increase his water intake.    Review of Systems   Constitutional:  Negative for chills and fever.   HENT:  Negative for ear pain and sore throat.    Eyes:  Negative for pain and visual disturbance.   Respiratory:  Negative for cough and shortness of breath.    Cardiovascular:  Negative for chest pain and palpitations.   Gastrointestinal:  Negative for abdominal pain and vomiting.   Genitourinary:  Negative for decreased  "urine volume, difficulty urinating, dysuria, flank pain, frequency, hematuria and urgency.   Musculoskeletal:  Negative for arthralgias and back pain.   Skin:  Negative for color change and rash.   Neurological:  Negative for seizures and syncope.   All other systems reviewed and are negative.               Vitals  Vitals:    07/17/25 0910   BP: 142/86   BP Location: Left arm   Patient Position: Sitting   Cuff Size: Adult   Pulse: 80   SpO2: 98%   Weight: 94.3 kg (208 lb)   Height: 5' 11\" (1.803 m)       Physical Exam  Vitals reviewed.   Constitutional:       Appearance: Normal appearance.   HENT:      Head: Normocephalic and atraumatic.     Eyes:      Conjunctiva/sclera: Conjunctivae normal.     Pulmonary:      Effort: Pulmonary effort is normal.   Abdominal:      General: Abdomen is flat. There is no distension.      Palpations: Abdomen is soft.      Tenderness: There is no abdominal tenderness.     Musculoskeletal:         General: Normal range of motion.      Cervical back: Normal range of motion.     Skin:     General: Skin is warm and dry.     Neurological:      General: No focal deficit present.      Mental Status: He is alert and oriented to person, place, and time.     Psychiatric:         Mood and Affect: Mood normal.         Behavior: Behavior normal.         Thought Content: Thought content normal.         Judgment: Judgment normal.           Past History  Past Medical History[1]  Social History[1]  Tobacco Use History[1]  Family History[1]    The following portions of the patient's history were reviewed and updated as appropriate: allergies, current medications, past medical history, past social history, past surgical history and problem list.    Results  Recent Results (from the past hour)   POCT Measure PVR    Collection Time: 07/17/25  9:13 AM   Result Value Ref Range    POST-VOID RESIDUAL VOLUME, ML POC 35 mL   ]  Lab Results   Component Value Date    PSA 1.171 05/22/2025    PSA 0.55 07/26/2023    " PSA 0.6 07/25/2022    PSA 0.6 02/08/2021     Lab Results   Component Value Date    GLUCOSE 98 11/13/2015    CALCIUM 9.6 06/10/2025     11/13/2015    K 4.8 06/10/2025    CO2 29 06/10/2025     06/10/2025    BUN 21 06/10/2025    CREATININE 0.96 06/10/2025     Lab Results   Component Value Date    WBC 6.66 05/22/2025    HGB 15.2 05/22/2025    HCT 45.8 05/22/2025    MCV 91 05/22/2025     (H) 05/22/2025     Yes okay         [1]  Past Medical History:  Diagnosis Date   • GERD (gastroesophageal reflux disease)    • Hyperlipidemia    • Hypertension    • Hypogonadotropic hypogonadism (HCC)    • Sleep apnea    [1]  Social History  Socioeconomic History   • Marital status: /Civil Union   Tobacco Use   • Smoking status: Never   • Smokeless tobacco: Former     Types: Snuff     Quit date: 10/1/2018   • Tobacco comments:     off and on not every day   Vaping Use   • Vaping status: Never Used   Substance and Sexual Activity   • Alcohol use: Yes     Alcohol/week: 4.0 standard drinks of alcohol     Types: 4 Standard drinks or equivalent per week   • Drug use: No   • Sexual activity: Yes     Partners: Female   Social History Narrative    Caffeine - 1-2 cups of coffee per day    Full Time -    [1]  Social History  Tobacco Use   Smoking Status Never   Smokeless Tobacco Former   • Types: Snuff   • Quit date: 10/1/2018   Tobacco Comments    off and on not every day   [1]  Family History  Problem Relation Name Age of Onset   • Hypertension Mother Marah em    • Kidney failure Mother Marah em    • Hypertension Father Duncan    • Hypertension Brother Don    • Heart attack Brother Don    • Heart attack Brother Don pablo         44

## 2025-07-17 ENCOUNTER — OFFICE VISIT (OUTPATIENT)
Dept: UROLOGY | Facility: CLINIC | Age: 65
End: 2025-07-17
Payer: COMMERCIAL

## 2025-07-17 VITALS
BODY MASS INDEX: 29.12 KG/M2 | OXYGEN SATURATION: 98 % | DIASTOLIC BLOOD PRESSURE: 86 MMHG | HEART RATE: 80 BPM | SYSTOLIC BLOOD PRESSURE: 142 MMHG | WEIGHT: 208 LBS | HEIGHT: 71 IN

## 2025-07-17 DIAGNOSIS — N32.89 BLADDER WALL THICKENING: Primary | ICD-10-CM

## 2025-07-17 LAB — POST-VOID RESIDUAL VOLUME, ML POC: 35 ML

## 2025-07-17 PROCEDURE — 51798 US URINE CAPACITY MEASURE: CPT

## 2025-07-17 PROCEDURE — 99213 OFFICE O/P EST LOW 20 MIN: CPT

## 2025-07-17 NOTE — ASSESSMENT & PLAN NOTE
Follow up to review renal US   Renal US- Mild nonspecific diffuse bladder wall trabeculation may be sequela of chronic partial bladder outlet obstruction or neurogenic bladder.  Reassurance provided regarding imaging today- diffuse bladder wall thickening- no masses of lesions   PVR- 25   UA- negative for significant microscopic blood and infection   Previous positive culture- only 1 from this year   Reports no issues with urination   No  medications   I recommend containing to monitor for worsening LUTS   If infections start to become recurrent can trial alpha blocker    Reviewed adequate hydration for UTI prevention and the importance of not holding his urine   Patient feels comfortable to follow up as needed

## 2025-07-31 DIAGNOSIS — E78.5 HYPERLIPIDEMIA, UNSPECIFIED HYPERLIPIDEMIA TYPE: ICD-10-CM

## 2025-08-01 RX ORDER — SIMVASTATIN 40 MG
40 TABLET ORAL DAILY
Qty: 90 TABLET | Refills: 1 | Status: SHIPPED | OUTPATIENT
Start: 2025-08-01

## 2025-08-04 DIAGNOSIS — K21.9 GASTROESOPHAGEAL REFLUX DISEASE WITHOUT ESOPHAGITIS: ICD-10-CM

## 2025-08-06 RX ORDER — OMEPRAZOLE 20 MG/1
20 CAPSULE, DELAYED RELEASE ORAL DAILY
Qty: 90 CAPSULE | Refills: 1 | Status: SHIPPED | OUTPATIENT
Start: 2025-08-06

## (undated) DEVICE — STERILE BETHLEHEM NASAL PACK: Brand: CARDINAL HEALTH

## (undated) DEVICE — ELECTRODE BLADE MOD E-Z CLEAN  2.75IN 7CM -0012AM

## (undated) DEVICE — SUT ETHILON 2-0 FS 18 IN 664H

## (undated) DEVICE — SUT CHROMIC 4-0 PS-4 18 IN 1643G

## (undated) DEVICE — GLOVE SRG BIOGEL ECLIPSE 7.5

## (undated) DEVICE — SYRINGE 10ML LL CONTROL TOP

## (undated) DEVICE — 2000CC GUARDIAN II: Brand: GUARDIAN

## (undated) DEVICE — Device

## (undated) DEVICE — TUBING SUCTION 5MM X 12 FT

## (undated) DEVICE — SUT MONOCRYL 5-0 P-3 18 IN Y493G

## (undated) DEVICE — SPLINT 1524050 5PK PAIR DOYLE II AIRWAY: Brand: DOYLE II ™

## (undated) DEVICE — SUT PLAIN 4-0 SC-1 18 IN 1828H

## (undated) DEVICE — NEURO PATTIES 1/2 X 3

## (undated) DEVICE — SCD SEQUENTIAL COMPRESSION COMFORT SLEEVE MEDIUM KNEE LENGTH: Brand: KENDALL SCD

## (undated) DEVICE — NEEDLE 25G X 1 1/2

## (undated) DEVICE — PENCIL ELECTROSURG E-Z CLEAN -0035H

## (undated) DEVICE — BLADE 1882040 5PK INFERIOR TURB 2MM